# Patient Record
Sex: MALE | Race: WHITE | NOT HISPANIC OR LATINO | Employment: OTHER | URBAN - METROPOLITAN AREA
[De-identification: names, ages, dates, MRNs, and addresses within clinical notes are randomized per-mention and may not be internally consistent; named-entity substitution may affect disease eponyms.]

---

## 2017-01-04 ENCOUNTER — TELEPHONE (OUTPATIENT)
Dept: FAMILY MEDICINE CLINIC | Facility: CLINIC | Age: 72
End: 2017-01-04

## 2017-01-04 DIAGNOSIS — H54.7 VISION PROBLEMS: Primary | ICD-10-CM

## 2017-01-23 ENCOUNTER — CLINICAL SUPPORT (OUTPATIENT)
Dept: FAMILY MEDICINE CLINIC | Facility: CLINIC | Age: 72
End: 2017-01-23

## 2017-01-23 DIAGNOSIS — E53.8 VITAMIN B12 DEFICIENCY: Primary | ICD-10-CM

## 2017-01-23 PROCEDURE — 96372 THER/PROPH/DIAG INJ SC/IM: CPT | Performed by: NURSE PRACTITIONER

## 2017-01-23 RX ORDER — CYANOCOBALAMIN 1000 UG/ML
1000 INJECTION, SOLUTION INTRAMUSCULAR; SUBCUTANEOUS ONCE
Status: COMPLETED | OUTPATIENT
Start: 2017-01-23 | End: 2017-01-23

## 2017-01-23 RX ORDER — ATORVASTATIN CALCIUM 40 MG/1
TABLET, FILM COATED ORAL
Qty: 90 TABLET | Refills: 3 | Status: SHIPPED | OUTPATIENT
Start: 2017-01-23 | End: 2018-01-25 | Stop reason: SDUPTHER

## 2017-01-23 RX ADMIN — CYANOCOBALAMIN 1000 MCG: 1000 INJECTION, SOLUTION INTRAMUSCULAR; SUBCUTANEOUS at 16:00

## 2017-01-23 NOTE — MR AVS SNAPSHOT
Darshan Bhatt   1/23/2017 2:10 PM   Appointment    Dept Phone:  [phone]   Encounter #:  [id number]    Provider:  NURSE/MA [place]   Department:  [facility]                Your Full Care Plan              Where to Get Your Medications      These medications were sent to [facility] - [place], [place] - [address]  - [phone] [facility] [phone] FX  [address] [facility] [place] [address]     Phone:  [phone]     atorvastatin 40 MG tablet            Your Updated Medication List          This list is accurate as of: 1/23/17  2:58 PM.  Always use your most recent med list.                aspirin 81 MG tablet       atorvastatin 40 MG tablet   Commonly known as:  LIPITOR   TAKE 1 TABLET EVERY DAY       buPROPion  MG 24 hr tablet   Commonly known as:  WELLBUTRIN XL   Take 1 tablet by mouth Daily.       desvenlafaxine 50 MG 24 hr tablet   Commonly known as:  PRISTIQ   Take 1 tablet by mouth Daily.       levothyroxine 125 MCG tablet   Commonly known as:  SYNTHROID, LEVOTHROID   TAKE 1 TABLET DAILY AS DIRECTED       NITROSTAT 0.3 MG SL tablet   Generic drug:  nitroglycerin       pseudoephedrine 30 MG tablet   Commonly known as:  SUDAFED       sotalol 80 MG tablet   Commonly known as:  BETAPACE       terbinafine 250 MG tablet   Commonly known as:  lamiSIL   TAKE ONE TABLET BY MOUTH DAILY AS DIRECTED       XARELTO 20 MG tablet   Generic drug:  rivaroxaban       ZYRTEC ALLERGY 10 MG tablet   Generic drug:  cetirizine               Medications to be Given to You by a Medical Professional     Due       Frequency    1/3/2017 cyanocobalamin injection 1,000 mcg  Every 28 Days      Instructions     None    Patient Instructions History      Upcoming Appointments     Visit Type Date Time Department    NURSE/MA VISIT 1/23/2017  2:10 PM LANI Mcclendon Signup     [facility] allows you to send messages to your  doctor, view your test results, renew your prescriptions, schedule appointments, and more. To sign up, go to [email or web address] and click on the Sign Up Now link in the New User? box. Enter your [employer] Activation Code exactly as it appears below along with the last four digits of your Social Security Number and your Date of Birth () to complete the sign-up process. If you do not sign up before the expiration date, you must request a new code.    [employer] Activation Code: [email or web address]  Expires: 2017  2:58 PM    If you have questions, you can email [email or web address] or call [phone] to talk to our [employer] staff. Remember, [employer] is NOT to be used for urgent needs. For medical emergencies, dial 911.               Other Info from Your Visit           Allergies     Meloxicam        Vital Signs     Smoking Status                   Former Smoker

## 2017-01-26 ENCOUNTER — OFFICE VISIT (OUTPATIENT)
Dept: FAMILY MEDICINE CLINIC | Facility: CLINIC | Age: 72
End: 2017-01-26

## 2017-01-26 VITALS
HEART RATE: 50 BPM | SYSTOLIC BLOOD PRESSURE: 118 MMHG | HEIGHT: 72 IN | OXYGEN SATURATION: 99 % | TEMPERATURE: 98.3 F | DIASTOLIC BLOOD PRESSURE: 64 MMHG | WEIGHT: 205 LBS | BODY MASS INDEX: 27.77 KG/M2
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
## 2017-01-26 DIAGNOSIS — R07.81 RIB PAIN ON LEFT SIDE: Primary | ICD-10-CM

## 2017-01-26 PROCEDURE — 99214 OFFICE O/P EST MOD 30 MIN: CPT | Performed by: NURSE PRACTITIONER

## 2017-01-26 PROCEDURE — 71101 X-RAY EXAM UNILAT RIBS/CHEST: CPT | Performed by: NURSE PRACTITIONER

## 2017-01-26 RX ORDER — CYCLOBENZAPRINE HCL 10 MG
10 TABLET ORAL 3 TIMES DAILY PRN
Qty: 30 TABLET | Refills: 0 | Status: SHIPPED | OUTPATIENT
Start: 2017-01-26 | End: 2017-04-05

## 2017-01-26 NOTE — PROGRESS NOTES
"Subjective   Darshan Bhatt is a 71 y.o. male.     History of Present Illness   Patient presenting to the office today after falling out of his bed last night.  He fell on his left side and back and felt immediate sharp pain on his back.  He had back in the bed and try to go back to sleep.  He does feel sharp pain on the back left side no bruising no worsening with the pain when he takes a deep breath he is here to make sure he is not broken a rib.  He's walking okay he did not he has had no other problems or concerns except for the left mid back pain  The following portions of the patient's history were reviewed and updated as appropriate: allergies, current medications, past social history and problem list.    Review of Systems   Musculoskeletal: Positive for back pain.   All other systems reviewed and are negative.      Objective   Visit Vitals   • /64 (BP Location: Left arm, Patient Position: Sitting)   • Pulse 50   • Temp 98.3 °F (36.8 °C)   • Ht 72\" (182.9 cm)   • Wt 205 lb (93 kg)   • SpO2 99%   • BMI 27.8 kg/m2     Physical Exam   Constitutional: He is oriented to person, place, and time. Vital signs are normal. He appears well-developed and well-nourished. No distress.   HENT:   Head: Normocephalic.   Cardiovascular: Normal rate, regular rhythm and normal heart sounds.    Pulmonary/Chest: Effort normal and breath sounds normal. He exhibits bony tenderness.   Tenderness on back left side along 9-10th ribs  No bruising    Neurological: He is alert and oriented to person, place, and time. Gait normal.   Psychiatric: He has a normal mood and affect. His behavior is normal. Judgment and thought content normal.   Vitals reviewed.    Xray- ribs and PA chest due to fall and pain    Findings- normal  No comparison    Assessment/Plan   Problem List Items Addressed This Visit        Nervous and Auditory    Rib pain on left side - Primary    Relevant Medications    cyclobenzaprine (FLEXERIL) 10 MG tablet    Other " Relevant Orders    XR ribs left w pa chest       Other    Fall from bed    Relevant Orders    XR ribs left w pa chest        Tylenol, heat, rto if no improvement

## 2017-01-26 NOTE — MR AVS SNAPSHOT
Darshan Bhatt   1/26/2017 9:20 AM   Office Visit    Dept Phone:  [phone]   Encounter #:  [id number]    Provider:  ROSEMARIE Johnston   Department:  [facility]                Your Full Care Plan              Today's Medication Changes          These changes are accurate as of: 1/26/17 10:54 AM.  If you have any questions, ask your nurse or doctor.               New Medication(s)Ordered:     cyclobenzaprine 10 MG tablet   Commonly known as:  FLEXERIL   Take 1 tablet by mouth 3 (Three) Times a Day As Needed for muscle spasms.   Started by:  ROSEMARIE Johnston         Stop taking medication(s)listed here:     desvenlafaxine 50 MG 24 hr tablet   Commonly known as:  PRISTIQ   Stopped by:  ROSEMARIE Johnston                Where to Get Your Medications      These [facility] [address] [facility] [facility] [facility] [address] [facility] [phone] [facility] [phone] [address] , [facility] [address]     Phone:  [phone]     cyclobenzaprine 10 MG tablet                  Your Updated Medication List          This list is accurate as of: 1/26/17 10:54 AM.  Always use your most recent med list.                aspirin 81 MG tablet       atorvastatin 40 MG tablet   Commonly known as:  LIPITOR   TAKE 1 TABLET EVERY DAY       buPROPion  MG 24 hr tablet   Commonly known as:  WELLBUTRIN XL   Take 1 tablet by mouth Daily.       cyclobenzaprine 10 MG tablet   Commonly known as:  FLEXERIL   Take 1 tablet by mouth 3 (Three) Times a Day As Needed for muscle spasms.       levothyroxine 125 MCG tablet   Commonly known as:  SYNTHROID, LEVOTHROID   TAKE 1 TABLET DAILY AS DIRECTED       NITROSTAT 0.3 MG SL tablet   Generic drug:  nitroglycerin       pseudoephedrine 30 MG tablet   Commonly known as:  SUDAFED       sotalol 80 MG tablet   Commonly known as:  BETAPACE       terbinafine 250 MG tablet   Commonly known as:   "lamiSIL   TAKE ONE TABLET BY MOUTH DAILY AS DIRECTED       XARELTO 20 MG tablet   Generic drug:  rivaroxaban       ZYRTEC ALLERGY 10 MG tablet   Generic drug:  cetirizine               You Were Diagnosed With        Codes Comments    Rib pain on left side    -  Primary ICD-10-CM: R07.81  ICD-9-CM: 786.50     Fall from bed, initial encounter     ICD-10-CM: W06.XXXA  ICD-9-CM: E884.4       Medications to be Given to You by a Medical Professional     Due       Frequency    1/3/2017 cyanocobalamin injection 1,000 mcg  Every 28 Days      Instructions     None    Patient Instructions History      Upcoming Appointments     Visit Type Date Time Department    SAME DAY 2017  9:20 AM LANI BRITO [facility]      [employer] Signup     [facility] [employer] allows you to send messages to your doctor, view your test results, renew your prescriptions, schedule appointments, and more. To sign up, go to [employer] and click on the Sign Up Now link in the New User? box. Enter your [employer] Activation Code exactly as it appears below along with the last four digits of your Social Security Number and your Date of Birth () to complete the sign-up process. If you do not sign up before the expiration date, you must request a new code.    [employer] Activation Code: [email or web address]  Expires: 2017  2:58 PM    If you have questions, you can email [email or web address] or call [phone] to talk to our [employer] staff. Remember, [employer] is NOT to be used for urgent needs. For medical emergencies, dial 911.               Other Info from Your Visit           Allergies     Meloxicam        Reason for Visit     Back Pain Patient fell out of his bed last night and is having pain in the middle of his back       Vital Signs     Blood Pressure Pulse Temperature Height Weight Oxygen Saturation    118/64 (BP Location: Left arm, Patient Position: Sitting) 50 98.3 °F (36.8 °C) 72\" (182.9 cm) 205 lb (93 kg) 99%    Body Mass " Index Smoking Status                27.8 kg/m2 Former Smoker          Problems and Diagnoses Noted     Fall from bed    Rib pain on left side

## 2017-01-30 ENCOUNTER — TELEPHONE (OUTPATIENT)
Dept: FAMILY MEDICINE CLINIC | Facility: CLINIC | Age: 72
End: 2017-01-30

## 2017-01-30 DIAGNOSIS — R07.81 RIB PAIN: Primary | ICD-10-CM

## 2017-01-30 RX ORDER — HYDROCODONE BITARTRATE AND ACETAMINOPHEN 5; 325 MG/1; MG/1
1 TABLET ORAL EVERY 6 HOURS PRN
Qty: 60 TABLET | Refills: 0 | Status: SHIPPED | OUTPATIENT
Start: 2017-01-30 | End: 2017-08-24

## 2017-02-28 ENCOUNTER — TELEPHONE (OUTPATIENT)
Dept: FAMILY MEDICINE CLINIC | Facility: CLINIC | Age: 72
End: 2017-02-28

## 2017-02-28 RX ORDER — BUPROPION HYDROCHLORIDE 150 MG/1
150 TABLET ORAL DAILY
Qty: 90 TABLET | Refills: 2 | Status: SHIPPED | OUTPATIENT
Start: 2017-02-28 | End: 2017-12-13 | Stop reason: SDUPTHER

## 2017-03-14 ENCOUNTER — OFFICE VISIT (OUTPATIENT)
Dept: FAMILY MEDICINE CLINIC | Facility: CLINIC | Age: 72
End: 2017-03-14

## 2017-03-14 VITALS
WEIGHT: 205.6 LBS | BODY MASS INDEX: 27.85 KG/M2 | TEMPERATURE: 97.7 F | DIASTOLIC BLOOD PRESSURE: 66 MMHG | HEART RATE: 47 BPM | SYSTOLIC BLOOD PRESSURE: 104 MMHG | HEIGHT: 72 IN | OXYGEN SATURATION: 94 %

## 2017-03-14 DIAGNOSIS — J32.9 SINUSITIS, UNSPECIFIED CHRONICITY, UNSPECIFIED LOCATION: Primary | ICD-10-CM

## 2017-03-14 PROCEDURE — 99213 OFFICE O/P EST LOW 20 MIN: CPT | Performed by: NURSE PRACTITIONER

## 2017-03-14 RX ORDER — AMOXICILLIN 875 MG/1
875 TABLET, COATED ORAL 2 TIMES DAILY
Qty: 20 TABLET | Refills: 0 | Status: SHIPPED | OUTPATIENT
Start: 2017-03-14 | End: 2017-04-05

## 2017-03-14 RX ORDER — FLUTICASONE PROPIONATE 50 MCG
2 SPRAY, SUSPENSION (ML) NASAL DAILY
COMMUNITY
End: 2018-09-25

## 2017-03-14 RX ORDER — PREDNISOLONE ACETATE 10 MG/ML
SUSPENSION/ DROPS OPHTHALMIC
COMMUNITY
Start: 2017-02-21 | End: 2017-04-05

## 2017-03-14 NOTE — PROGRESS NOTES
"Krish Bhatt is a 71 y.o. male.     History of Present Illness   Upper Respiratory Infection: Patient complains of symptoms of a URI, possible sinusitis. Symptoms include congestion, irritability, plugged sensation in both ears, sore throat and swollen glands. Onset of symptoms was 5 days ago, unchanged since that time. He also c/o facial pain for the past 4 days .  He is drinking plenty of fluids. Evaluation to date: none. Treatment to date: none.        The following portions of the patient's history were reviewed and updated as appropriate: allergies, current medications, past social history and problem list.    Review of Systems   HENT: Positive for congestion and sinus pressure.    All other systems reviewed and are negative.      Objective   Visit Vitals   • /66 (BP Location: Right arm, Patient Position: Sitting)   • Pulse (!) 47   • Temp 97.7 °F (36.5 °C)   • Ht 72\" (182.9 cm)   • Wt 205 lb 9.6 oz (93.3 kg)   • SpO2 94%   • BMI 27.88 kg/m2     Physical Exam   Constitutional: He is oriented to person, place, and time. Vital signs are normal. He appears well-developed and well-nourished. No distress.   HENT:   Head: Normocephalic.   Right Ear: Tympanic membrane normal.   Left Ear: Tympanic membrane normal.   Nose: Rhinorrhea present.   Mouth/Throat: Posterior oropharyngeal erythema present.   Cardiovascular: Normal rate, regular rhythm and normal heart sounds.    Pulmonary/Chest: Effort normal and breath sounds normal.   Lymphadenopathy:     He has no cervical adenopathy.   Neurological: He is alert and oriented to person, place, and time. Gait normal.   Psychiatric: He has a normal mood and affect. His behavior is normal. Judgment and thought content normal.   Vitals reviewed.      Assessment/Plan   Problem List Items Addressed This Visit        Respiratory    Sinusitis - Primary    Relevant Medications    amoxicillin (AMOXIL) 875 MG tablet        rto prn     "

## 2017-03-21 ENCOUNTER — TRANSCRIBE ORDERS (OUTPATIENT)
Dept: ADMINISTRATIVE | Facility: HOSPITAL | Age: 72
End: 2017-03-21

## 2017-03-21 DIAGNOSIS — D03.0 MELANOMA IN SITU OF LIP (HCC): Primary | ICD-10-CM

## 2017-04-05 ENCOUNTER — HOSPITAL ENCOUNTER (OUTPATIENT)
Dept: INFUSION THERAPY | Facility: HOSPITAL | Age: 72
Discharge: HOME | End: 2017-04-05
Attending: SURGERY | Admitting: SURGERY

## 2017-04-05 VITALS
RESPIRATION RATE: 20 BRPM | TEMPERATURE: 98.5 F | OXYGEN SATURATION: 95 % | HEART RATE: 48 BPM | DIASTOLIC BLOOD PRESSURE: 64 MMHG | SYSTOLIC BLOOD PRESSURE: 94 MMHG

## 2017-04-05 DIAGNOSIS — C43.9 SKIN CANCER (MELANOMA) (HCC): Primary | ICD-10-CM

## 2017-04-05 PROCEDURE — 88305 TISSUE EXAM BY PATHOLOGIST: CPT | Performed by: SURGERY

## 2017-04-05 RX ORDER — LIDOCAINE HYDROCHLORIDE AND EPINEPHRINE 10; 10 MG/ML; UG/ML
20 INJECTION, SOLUTION INFILTRATION; PERINEURAL ONCE
Status: COMPLETED | OUTPATIENT
Start: 2017-04-05 | End: 2017-04-05

## 2017-04-05 RX ADMIN — SODIUM BICARBONATE 0.5 MG: 84 INJECTION, SOLUTION INTRAVENOUS at 15:50

## 2017-04-05 RX ADMIN — LIDOCAINE HYDROCHLORIDE,EPINEPHRINE BITARTRATE 20 ML: 10; .01 INJECTION, SOLUTION INFILTRATION; PERINEURAL at 15:50

## 2017-04-05 NOTE — OP NOTE
DATE OF PROCEDURE:  04/05/2017    SURGEON:  Oswald Fernandez MD     ASSISTANT: ST King     PREOPERATIVE DIAGNOSIS: Melanoma in situ, left side of upper lip.     POSTOPERATIVE DIAGNOSIS:  Melanoma in situ, left side of upper lip.     PROCEDURES PERFORMED:  Wide excision of melanoma in situ of left upper lip with local transposition flap closure.     ANESTHESIA: Lidocaine 1% with epinephrine.     FINDINGS: This 71-year-old male has a previously biopsied melanoma in situ involving the right side of the upper lip just below the left alar base. The excisional diameter is 2.1 cm.  The size of the flap used for reconstruction is 22.5 cm.     DESCRIPTION OF PROCEDURE: The patient was brought to the procedure room in the ambulatory care unit. He was marked out for the clinical margins of the previous biopsy. There was a small amount of very faint pigmentation in one area. We then marked out a 5 mm margin of uninvolved skin around this. Then, 1% lidocaine with epinephrine was used for a block of the infraorbital nerve and then for local infiltration of the upper lip as well as the superiorly based nasolabial flap that would be used for reconstruction. He was prepped with Betadine and draped into a sterile field. The flap had been marked out which was a superiorly based nasolabial flap. We measured the defect and had appropriate width so as to cause as little distortion of the lip as possible. I first excised the melanoma in situ and marked it with a short suture superiorly at the 12-o'clock position and a long suture on the left lateral side at the 3-o'clock position. Hemostasis was achieved with electrocautery. Then, the superiorly based nasolabial flap was incised. I then elevated, including adequate vascularity to give appropriate thickness and vascularity. It was then inset and trimmed as necessary. The flap was inset with 5-0 Vicryl subcuticular sutures and 6-0 nylon skin sutures, and the donor site closed in  the same fashion. The patient tolerated the procedure well. Written and verbal instructions in care were given.       HERMILO Corona:fran  D:   04/05/2017 16:56:11  T:   04/05/2017 18:29:17  Job ID:   [id number]  Document ID:   [id number]  cc:

## 2017-04-05 NOTE — PROGRESS NOTES
Tolerated procedure very well. Discussed discharge instructions using teach back method. Provided the patient a copy of the AVS and the prescription for pain medication from Dr. Fernandez. Discharged home at 1710. Security van called to take patient to his car due to weather.

## 2017-04-05 NOTE — PATIENT INSTRUCTIONS
MINOR SURGERY DISCHARGE INSTRUCTIONS    IMPORTANT:  The following information will help you return to your best level of health.  Wound Care:  ·  Your dressing may be removed:  ·   Tomorrow    ·  Clean suture line with peroxide 1-2 times a day.  ·  If incision is on head or neck, cover your pillow with a towel.  ·  Avoid stooping over or heavy lifting.  ·  If the incision is near your ear, clean your telephones.  ·  Avoid sun exposure to site.  You may shower:  ·  Tomorrow  ·  Apply ointment to incision 1-2 times a day.  Remove old ointment first.  ·  Apply ice to area for 24 hours - 15 minutes on & 15 minutes off.  30 minutes on  & 30 minutes off while awake.  Use an extra pillow when sleeping.  ·  Elevate area for 24-48 hours to reduce swelling.  Medications:   Following this procedure, you should continue to take all other medications as  directed by your primary physician unless otherwise instructed. There have been  no changes to the medications you provided us (with the following exceptions, if  applicable):   Resume taking your blood thinners or Aspirin on ___4/5/17________________   You may use Tylenol  for discomfort.   Other: _______________________________________________________  Activity:  ·    ·  You may resume normal activity:  ·    In __2____ days  ·  No strenuous activity, lifting or sports:  ·   Until seen by your MD    Call your doctor to make an appointment for:     On (date) ______Tuesday April 11th__________________________  Physician: Dr. Fernandez  Office # [phone]  Incision Care  An incision is when a surgeon cuts into your body. After surgery, the incision needs to be cared for properly to prevent infection.   HOW TO CARE FOR YOUR INCISION  · Take medicines only as directed by your health care provider.  · There are many different ways to close and cover an incision, including stitches, skin glue, and adhesive strips. Follow your health care provider's instructions on:    Incision care.     Bandage (dressing) changes and removal.    Incision closure removal.  · Do not take baths, swim, or use a hot tub until your health care provider approves. You may shower as directed by your health care provider.  · Resume your normal diet and activities as directed.  · Use anti-itch medicine (such as an antihistamine) as directed by your health care provider. The incision may itch while it is healing. Do not pick or scratch at the incision.  · Drink enough fluid to keep your urine clear or pale yellow.  SEEK MEDICAL CARE IF:   · You have drainage, redness, swelling, or pain at your incision site.  · You have muscle aches, chills, or a general ill feeling.  · You notice a bad smell coming from the incision or dressing.  · Your incision edges separate after the sutures, staples, or skin adhesive strips have been removed.  · You have persistent nausea or vomiting.  · You have a fever.  · You are dizzy.  SEEK IMMEDIATE MEDICAL CARE IF:   · You have a rash.  · You faint.  · You have difficulty breathing.  MAKE SURE YOU:   · Understand these instructions.  · Will watch your condition.  · Will get help right away if you are not doing well or get worse.     This information is not intended to replace advice given to you by your health care provider. Make sure you discuss any questions you have with your health care provider.     Document Released: 07/07/2006 Document Revised: 01/08/2016 Document Reviewed: 02/11/2015  [employer] Interactive Patient Education ©2016 [employer]

## 2017-04-07 LAB
CYTO UR: NORMAL
LAB AP CASE REPORT: NORMAL
LAB AP CLINICAL INFORMATION: NORMAL
Lab: NORMAL
PATH REPORT.FINAL DX SPEC: NORMAL
PATH REPORT.GROSS SPEC: NORMAL

## 2017-04-18 ENCOUNTER — CLINICAL SUPPORT (OUTPATIENT)
Dept: FAMILY MEDICINE CLINIC | Facility: CLINIC | Age: 72
End: 2017-04-18

## 2017-04-18 DIAGNOSIS — E53.8 VITAMIN B12 DEFICIENCY: Primary | ICD-10-CM

## 2017-04-18 PROCEDURE — 96372 THER/PROPH/DIAG INJ SC/IM: CPT | Performed by: NURSE PRACTITIONER

## 2017-04-18 RX ORDER — CYANOCOBALAMIN 1000 UG/ML
1000 INJECTION, SOLUTION INTRAMUSCULAR; SUBCUTANEOUS
Status: DISCONTINUED | OUTPATIENT
Start: 2017-04-18 | End: 2018-10-30

## 2017-04-18 RX ADMIN — CYANOCOBALAMIN 1000 MCG: 1000 INJECTION, SOLUTION INTRAMUSCULAR; SUBCUTANEOUS at 10:43

## 2017-06-02 ENCOUNTER — TELEPHONE (OUTPATIENT)
Dept: FAMILY MEDICINE CLINIC | Facility: CLINIC | Age: 72
End: 2017-06-02

## 2017-06-02 ENCOUNTER — CLINICAL SUPPORT (OUTPATIENT)
Dept: FAMILY MEDICINE CLINIC | Facility: CLINIC | Age: 72
End: 2017-06-02

## 2017-06-02 DIAGNOSIS — E53.8 VITAMIN B12 DEFICIENCY: Primary | ICD-10-CM

## 2017-06-02 PROCEDURE — 96372 THER/PROPH/DIAG INJ SC/IM: CPT | Performed by: NURSE PRACTITIONER

## 2017-06-02 RX ORDER — CYANOCOBALAMIN 1000 UG/ML
1000 INJECTION, SOLUTION INTRAMUSCULAR; SUBCUTANEOUS
Status: DISCONTINUED | OUTPATIENT
Start: 2017-06-02 | End: 2018-10-30

## 2017-06-02 RX ADMIN — CYANOCOBALAMIN 1000 MCG: 1000 INJECTION, SOLUTION INTRAMUSCULAR; SUBCUTANEOUS at 11:19

## 2017-06-02 NOTE — TELEPHONE ENCOUNTER
Patient is needing a referral [facility] Fitz Cox before he can make an appointment

## 2017-06-05 DIAGNOSIS — M54.50 LOW BACK PAIN WITHOUT SCIATICA, UNSPECIFIED BACK PAIN LATERALITY, UNSPECIFIED CHRONICITY: Primary | ICD-10-CM

## 2017-06-12 ENCOUNTER — OFFICE VISIT (OUTPATIENT)
Dept: FAMILY MEDICINE CLINIC | Facility: CLINIC | Age: 72
End: 2017-06-12

## 2017-06-12 VITALS
WEIGHT: 206.8 LBS | DIASTOLIC BLOOD PRESSURE: 60 MMHG | BODY MASS INDEX: 28.01 KG/M2 | SYSTOLIC BLOOD PRESSURE: 114 MMHG | HEART RATE: 58 BPM | HEIGHT: 72 IN | OXYGEN SATURATION: 98 % | TEMPERATURE: 97.9 F

## 2017-06-12 DIAGNOSIS — I48.91 ATRIAL FIBRILLATION, UNSPECIFIED TYPE (HCC): Primary | ICD-10-CM

## 2017-06-12 DIAGNOSIS — C43.9 MALIGNANT MELANOMA OF SKIN (HCC): ICD-10-CM

## 2017-06-12 DIAGNOSIS — E78.01 FAMILIAL HYPERCHOLESTEROLEMIA: ICD-10-CM

## 2017-06-12 DIAGNOSIS — N40.0 BENIGN PROSTATIC HYPERPLASIA, PRESENCE OF LOWER URINARY TRACT SYMPTOMS UNSPECIFIED, UNSPECIFIED MORPHOLOGY: ICD-10-CM

## 2017-06-12 DIAGNOSIS — I25.10 CORONARY ARTERIOSCLEROSIS IN NATIVE ARTERY: ICD-10-CM

## 2017-06-12 DIAGNOSIS — E03.9 HYPOTHYROIDISM, UNSPECIFIED TYPE: ICD-10-CM

## 2017-06-12 DIAGNOSIS — R42 DIZZINESS: ICD-10-CM
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
## 2017-06-12 PROBLEM — R07.81 RIB PAIN ON LEFT SIDE: Status: RESOLVED | Noted: 2017-01-26 | Resolved: 2017-06-12

## 2017-06-12 PROBLEM — J32.9 SINUSITIS: Status: RESOLVED | Noted: 2017-03-14 | Resolved: 2017-06-12

## 2017-06-12 PROCEDURE — 93000 ELECTROCARDIOGRAM COMPLETE: CPT | Performed by: NURSE PRACTITIONER

## 2017-06-12 PROCEDURE — 99214 OFFICE O/P EST MOD 30 MIN: CPT | Performed by: NURSE PRACTITIONER

## 2017-06-12 NOTE — PROGRESS NOTES
"Subjective   Darshan Bhatt is a 71 y.o. male.     History of Present Illness   Patient was helping his daughter and son-in-law move on Saturday and was walking up the basement stairs when he got to the top he stated that he felt very dizzy.  His son-in-law and his wife stated that with him between them and he passed out but he never went down to the floor because they held him up.  After he came to wishes a few seconds he drank about a gallon of water and started to feel better.  After the incident he was able to still help him move that he only broke down boxes he did not do any strenuous activity after that.  He does have a fibrillation and sees a cardiologist for this but hasn't seen him since probably March or April.  Patient states that he had eaten well that day he was feeling fine before hand but he was doing a lot of strenuous activity along with heavy lifting.  Patient states he did not experience any shortness of breath nausea or chest pain at the time of the incident.  Patient also has a history of stent placement and is taking his medications as directed.  Patient states since the incident he has felt fine he's not had any other dizzy spells or any other passing out spells.    The following portions of the patient's history were reviewed and updated as appropriate: allergies, current medications, past social history and problem list.    Review of Systems   Neurological: Positive for dizziness.   All other systems reviewed and are negative.      Objective   /60 (BP Location: Right arm, Patient Position: Sitting)  Pulse 58  Temp 97.9 °F (36.6 °C)  Ht 72\" (182.9 cm)  Wt 206 lb 12.8 oz (93.8 kg)  SpO2 98%  BMI 28.05 kg/m2  Physical Exam   Constitutional: He is oriented to person, place, and time. Vital signs are normal. He appears well-developed and well-nourished. No distress.   HENT:   Head: Normocephalic.   Cardiovascular: Normal rate, regular rhythm and normal heart sounds.    Pulmonary/Chest: " Effort normal and breath sounds normal.   Neurological: He is alert and oriented to person, place, and time. Gait normal.   Psychiatric: He has a normal mood and affect. His behavior is normal. Judgment and thought content normal.   Vitals reviewed.      ECG 12 Lead  Date/Time: 6/12/2017 1:43 PM  Performed by: STACY MORALES  Authorized by: STACY MORALES   Comparison: compared with previous ECG   Rhythm: atrial fibrillation  Rate: tachycardic  Conduction: conduction normal  ST Segments: ST segments normal  T Waves: T waves normal  QRS axis: normal  Clinical impression: abnormal ECG              Assessment/Plan   Problem List Items Addressed This Visit        Cardiovascular and Mediastinum    Atrial fibrillation - Primary    Relevant Orders    ECG 12 Lead    Coronary arteriosclerosis in native artery    Hyperlipidemia    Relevant Orders    Comprehensive Metabolic Panel    Lipid Panel With LDL / HDL Ratio       Endocrine    Hypothyroidism    Relevant Orders    TSH       Musculoskeletal and Integument    Malignant melanoma of skin    Relevant Orders    Ambulatory Referral to Dermatology       Genitourinary    Benign prostatic hyperplasia    Relevant Orders    PSA       Other    Dizziness    Relevant Orders    CBC & Differential           appointment with Cardio in 2 days.  I believe the Stranger was working on Saturday along with him probably at that time being in A. fib cause him to be dizzy and passed out.  rto prn  Follow up after labs

## 2017-06-13 LAB
ALBUMIN SERPL-MCNC: 4 G/DL (ref 3.5–4.8)
ALBUMIN/GLOB SERPL: 1.5 {RATIO} (ref 1.2–2.2)
ALP SERPL-CCNC: 65 IU/L (ref 39–117)
ALT SERPL-CCNC: 15 IU/L (ref 0–44)
AST SERPL-CCNC: 16 IU/L (ref 0–40)
BASOPHILS # BLD AUTO: 0 X10E3/UL (ref 0–0.2)
BASOPHILS NFR BLD AUTO: 0 %
BILIRUB SERPL-MCNC: 1 MG/DL (ref 0–1.2)
BUN SERPL-MCNC: 17 MG/DL (ref 8–27)
BUN/CREAT SERPL: 16 (ref 10–24)
CALCIUM SERPL-MCNC: 9 MG/DL (ref 8.6–10.2)
CHLORIDE SERPL-SCNC: 100 MMOL/L (ref 96–106)
CHOLEST SERPL-MCNC: 126 MG/DL (ref 100–199)
CO2 SERPL-SCNC: 24 MMOL/L (ref 18–29)
CREAT SERPL-MCNC: 1.04 MG/DL (ref 0.76–1.27)
EOSINOPHIL # BLD AUTO: 0.2 X10E3/UL (ref 0–0.4)
EOSINOPHIL NFR BLD AUTO: 3 %
ERYTHROCYTE [DISTWIDTH] IN BLOOD BY AUTOMATED COUNT: 14.1 % (ref 12.3–15.4)
GLOBULIN SER CALC-MCNC: 2.7 G/DL (ref 1.5–4.5)
GLUCOSE SERPL-MCNC: 96 MG/DL (ref 65–99)
HCT VFR BLD AUTO: 46.2 % (ref 37.5–51)
HDLC SERPL-MCNC: 53 MG/DL
HGB BLD-MCNC: 14.9 G/DL (ref 12.6–17.7)
IMM GRANULOCYTES # BLD: 0 X10E3/UL (ref 0–0.1)
IMM GRANULOCYTES NFR BLD: 0 %
LDLC SERPL CALC-MCNC: 49 MG/DL (ref 0–99)
LDLC/HDLC SERPL: 0.9 RATIO UNITS (ref 0–3.6)
LYMPHOCYTES # BLD AUTO: 1.9 X10E3/UL (ref 0.7–3.1)
LYMPHOCYTES NFR BLD AUTO: 29 %
MCH RBC QN AUTO: 30.1 PG (ref 26.6–33)
MCHC RBC AUTO-ENTMCNC: 32.3 G/DL (ref 31.5–35.7)
MCV RBC AUTO: 93 FL (ref 79–97)
MONOCYTES # BLD AUTO: 0.6 X10E3/UL (ref 0.1–0.9)
MONOCYTES NFR BLD AUTO: 10 %
NEUTROPHILS # BLD AUTO: 3.7 X10E3/UL (ref 1.4–7)
NEUTROPHILS NFR BLD AUTO: 58 %
PLATELET # BLD AUTO: 168 X10E3/UL (ref 150–379)
POTASSIUM SERPL-SCNC: 4.5 MMOL/L (ref 3.5–5.2)
PROT SERPL-MCNC: 6.7 G/DL (ref 6–8.5)
PSA SERPL-MCNC: 2.8 NG/ML (ref 0–4)
RBC # BLD AUTO: 4.95 X10E6/UL (ref 4.14–5.8)
SODIUM SERPL-SCNC: 143 MMOL/L (ref 134–144)
TRIGL SERPL-MCNC: 120 MG/DL (ref 0–149)
TSH SERPL DL<=0.005 MIU/L-ACNC: 3.33 UIU/ML (ref 0.45–4.5)
VLDLC SERPL CALC-MCNC: 24 MG/DL (ref 5–40)
WBC # BLD AUTO: 6.5 X10E3/UL (ref 3.4–10.8)

## 2017-06-30 ENCOUNTER — TELEPHONE (OUTPATIENT)
Dept: FAMILY MEDICINE CLINIC | Facility: CLINIC | Age: 72
End: 2017-06-30

## 2017-08-24 ENCOUNTER — OFFICE VISIT (OUTPATIENT)
Dept: FAMILY MEDICINE CLINIC | Facility: CLINIC | Age: 72
End: 2017-08-24

## 2017-08-24 VITALS
HEART RATE: 40 BPM | WEIGHT: 207.8 LBS | OXYGEN SATURATION: 98 % | BODY MASS INDEX: 28.15 KG/M2 | HEIGHT: 72 IN | RESPIRATION RATE: 16 BRPM | SYSTOLIC BLOOD PRESSURE: 116 MMHG | TEMPERATURE: 97.5 F | DIASTOLIC BLOOD PRESSURE: 74 MMHG

## 2017-08-24 DIAGNOSIS — E03.9 HYPOTHYROIDISM, UNSPECIFIED TYPE: ICD-10-CM

## 2017-08-24 DIAGNOSIS — E53.8 VITAMIN B12 DEFICIENCY: ICD-10-CM

## 2017-08-24 DIAGNOSIS — E78.01 FAMILIAL HYPERCHOLESTEROLEMIA: ICD-10-CM

## 2017-08-24 DIAGNOSIS — I25.10 CORONARY ARTERIOSCLEROSIS IN NATIVE ARTERY: ICD-10-CM

## 2017-08-24 DIAGNOSIS — I48.91 ATRIAL FIBRILLATION, UNSPECIFIED TYPE (HCC): Primary | ICD-10-CM

## 2017-08-24 PROCEDURE — 99214 OFFICE O/P EST MOD 30 MIN: CPT | Performed by: INTERNAL MEDICINE

## 2017-08-24 RX ORDER — SILODOSIN 8 MG/1
8 CAPSULE ORAL
COMMUNITY
End: 2018-09-25 | Stop reason: ALTCHOICE

## 2017-08-24 NOTE — PROGRESS NOTES
"Subjective   Patient ID: Darshan Bhatt is a 71 y.o. male presents with   Chief Complaint   Patient presents with   • Establish Care     New patient       HPI - This patient presents today to establish care.  He has a past history of coronary artery disease, atrial fibrillation, vitamin B12 deficiency, hypothyroidism, hyperlipidemia.  He sees cardiologist routinely.  He says at times he has poor exercise tolerance.  He gets weak if he walks up a hill.  But this is not new.    Assessment plan    Atrial fibrillation coronary artery disease-if the patient's having problems with exercise tolerance I would recommend the patient speak to his cardiologist as his pulse is very low on the [place].    Atrial fibrillation anticoagulated    Hypothyroidism levothyroxin 125    Vitamin B12 deficiency takes oral vitamin B12    Hyperlipidemia Lipitor 40    Allergies   Allergen Reactions   • Meloxicam        The following portions of the patient's history were reviewed and updated as appropriate: allergies, current medications, past family history, past medical history, past social history, past surgical history and problem list.      Review of Systems   Constitutional: Negative.    HENT: Negative.    Eyes: Negative.    Respiratory: Negative.    Cardiovascular: Negative.    Gastrointestinal: Negative.    Endocrine: Negative.    Genitourinary: Negative.    Musculoskeletal: Negative.    Skin: Negative.    Allergic/Immunologic: Negative.    Neurological: Negative.    Hematological: Negative.    Psychiatric/Behavioral: Negative.        Objective     Vitals:    08/24/17 1056   BP: 116/74   Pulse: (!) 40   Resp: 16   Temp: 97.5 °F (36.4 °C)   TempSrc: Oral   SpO2: 98%   Weight: 207 lb 12.8 oz (94.3 kg)   Height: 72\" (182.9 cm)         Physical Exam   Constitutional: He is oriented to person, place, and time. He appears well-developed and well-nourished. No distress.   HENT:   Head: Normocephalic and atraumatic.   Eyes: Conjunctivae and EOM " are normal. Pupils are equal, round, and reactive to light. Right eye exhibits no discharge. Left eye exhibits no discharge. No scleral icterus.   Neck: Normal range of motion. Neck supple. No tracheal deviation present. No thyromegaly present.   Cardiovascular: Normal rate, regular rhythm, normal heart sounds, intact distal pulses and normal pulses.  Exam reveals no gallop.    No murmur heard.  Pulmonary/Chest: Effort normal and breath sounds normal. No respiratory distress. He has no wheezes. He has no rales.   Musculoskeletal: Normal range of motion.   Neurological: He is alert and oriented to person, place, and time. He exhibits normal muscle tone. Coordination normal.   Skin: Skin is warm. No rash noted. No erythema. No pallor.   Psychiatric: He has a normal mood and affect. His behavior is normal. Judgment and thought content normal.   Nursing note and vitals reviewed.        Darshan was seen today for establish care.    Diagnoses and all orders for this visit:    Atrial fibrillation, unspecified type    Coronary arteriosclerosis in native artery    Hypothyroidism, unspecified type    Vitamin B12 deficiency    Familial hypercholesterolemia        Call or return to clinic prn if these symptoms worsen or fail to improve as anticipated.

## 2017-09-18 ENCOUNTER — DOCUMENTATION (OUTPATIENT)
Dept: ONCOLOGY | Facility: HOSPITAL | Age: 72
End: 2017-09-18

## 2017-09-18 NOTE — PROGRESS NOTES
Referral rec'd from [facility] [place].  Patient in need of therapy for depression.  Called and spoke to patient via phone.  Patient states that he has suffered with depression for years and is on medication ( wellbutrin XL 15o mg) that is being managed by his primary care office.  Patient states that his depression does not stem from his recent melanoma dx but something that he has battled for a while.  Patient would like the names of therapists in the community that could help him.  [facility] provided a list via mail.    Encouraged patient to call [facility] if none of these providers work out for him.  Thank you,  Shaina Chavez, MSSW, CSW, OSW-C

## 2017-11-21 RX ORDER — RIVAROXABAN 20 MG/1
20 TABLET, FILM COATED ORAL
Qty: 90 TABLET | Refills: 3 | Status: SHIPPED | OUTPATIENT
Start: 2017-11-21 | End: 2018-10-02 | Stop reason: SDUPTHER

## 2017-12-13 ENCOUNTER — TELEPHONE (OUTPATIENT)
Dept: FAMILY MEDICINE CLINIC | Facility: CLINIC | Age: 72
End: 2017-12-13

## 2017-12-13 RX ORDER — BUPROPION HYDROCHLORIDE 150 MG/1
TABLET ORAL
Qty: 90 TABLET | Refills: 2 | Status: CANCELLED | OUTPATIENT
Start: 2017-12-13

## 2017-12-13 RX ORDER — BUPROPION HYDROCHLORIDE 150 MG/1
150 TABLET ORAL DAILY
Qty: 90 TABLET | Refills: 2 | Status: SHIPPED | OUTPATIENT
Start: 2017-12-13 | End: 2018-09-10 | Stop reason: SDUPTHER

## 2017-12-13 NOTE — TELEPHONE ENCOUNTER
Patient got a refill for Bupropion but it came from mail order and he has to wait to get it in the mail so he is going to go to the pharmacy and try to get another couple pills but can we call the rep and see if we can get him some samples. Please call him on [phone]

## 2018-01-18 RX ORDER — ATORVASTATIN CALCIUM 40 MG/1
TABLET, FILM COATED ORAL
Qty: 90 TABLET | Refills: 3 | OUTPATIENT
Start: 2018-01-18

## 2018-01-23 RX ORDER — ATORVASTATIN CALCIUM 40 MG/1
TABLET, FILM COATED ORAL
Qty: 90 TABLET | Refills: 3 | OUTPATIENT
Start: 2018-01-23

## 2018-01-25 RX ORDER — ATORVASTATIN CALCIUM 40 MG/1
40 TABLET, FILM COATED ORAL DAILY
Qty: 90 TABLET | Refills: 1 | Status: SHIPPED | OUTPATIENT
Start: 2018-01-25 | End: 2018-07-03 | Stop reason: SDUPTHER

## 2018-02-22 ENCOUNTER — TELEPHONE (OUTPATIENT)
Dept: FAMILY MEDICINE CLINIC | Facility: CLINIC | Age: 73
End: 2018-02-22

## 2018-02-22 DIAGNOSIS — M25.512 CHRONIC PAIN OF BOTH SHOULDERS: Primary | ICD-10-CM

## 2018-02-22 DIAGNOSIS — G89.29 CHRONIC PAIN OF BOTH SHOULDERS: Primary | ICD-10-CM

## 2018-02-22 DIAGNOSIS — M25.511 CHRONIC PAIN OF BOTH SHOULDERS: Primary | ICD-10-CM

## 2018-03-07 ENCOUNTER — TREATMENT (OUTPATIENT)
Dept: PHYSICAL THERAPY | Facility: CLINIC | Age: 73
End: 2018-03-07

## 2018-03-07 DIAGNOSIS — M75.01 SECONDARY ADHESIVE CAPSULITIS OF SHOULDER, RIGHT: Primary | ICD-10-CM

## 2018-03-07 PROCEDURE — 97140 MANUAL THERAPY 1/> REGIONS: CPT | Performed by: PHYSICAL THERAPIST

## 2018-03-07 PROCEDURE — G8985 CARRY GOAL STATUS: HCPCS | Performed by: PHYSICAL THERAPIST

## 2018-03-07 PROCEDURE — G8984 CARRY CURRENT STATUS: HCPCS | Performed by: PHYSICAL THERAPIST

## 2018-03-07 PROCEDURE — 97110 THERAPEUTIC EXERCISES: CPT | Performed by: PHYSICAL THERAPIST

## 2018-03-07 PROCEDURE — 97161 PT EVAL LOW COMPLEX 20 MIN: CPT | Performed by: PHYSICAL THERAPIST

## 2018-03-12 ENCOUNTER — TREATMENT (OUTPATIENT)
Dept: PHYSICAL THERAPY | Facility: CLINIC | Age: 73
End: 2018-03-12

## 2018-03-12 DIAGNOSIS — M75.01 SECONDARY ADHESIVE CAPSULITIS OF SHOULDER, RIGHT: Primary | ICD-10-CM

## 2018-03-12 PROCEDURE — 97110 THERAPEUTIC EXERCISES: CPT | Performed by: PHYSICAL THERAPIST

## 2018-03-12 PROCEDURE — 97140 MANUAL THERAPY 1/> REGIONS: CPT | Performed by: PHYSICAL THERAPIST

## 2018-03-12 PROCEDURE — 97035 APP MDLTY 1+ULTRASOUND EA 15: CPT | Performed by: PHYSICAL THERAPIST

## 2018-03-12 NOTE — PROGRESS NOTES
Physical Therapy Daily Progress Note        Subjective     Darshan Bhatt reports: No significant change yet. I did HEP twice.    Objective   See Exercise, Manual, and Modality Logs for complete treatment.       Assessment/Plan  Some soreness with therex. Difficulty with ball pressdowns. Limited scap retraction. Needed cueing for L scapula to retract and elevate    Progress per Plan of Care           Manual Therapy:  15       mins  61500;  Therapeutic Exercise: 20      mins  51907;     Neuromuscular Staci:       mins  55685;    Therapeutic Activity:         mins  19997;     Gait Training:         mins  06074;     Ultrasound:   8      mins  83602;    Electrical Stimulation:        mins  18985 ( );  Dry Needling         mins self-pay    Timed Treatment:   43   mins   Total Treatment:     45   mins    Dee Clark PT  Physical Therapist  [place] License # [id number]

## 2018-03-14 ENCOUNTER — TREATMENT (OUTPATIENT)
Dept: PHYSICAL THERAPY | Facility: CLINIC | Age: 73
End: 2018-03-14

## 2018-03-14 DIAGNOSIS — M75.01 SECONDARY ADHESIVE CAPSULITIS OF SHOULDER, RIGHT: Primary | ICD-10-CM

## 2018-03-14 PROCEDURE — 97140 MANUAL THERAPY 1/> REGIONS: CPT | Performed by: PHYSICAL THERAPIST

## 2018-03-14 PROCEDURE — 97110 THERAPEUTIC EXERCISES: CPT | Performed by: PHYSICAL THERAPIST

## 2018-03-14 PROCEDURE — 97112 NEUROMUSCULAR REEDUCATION: CPT | Performed by: PHYSICAL THERAPIST

## 2018-03-14 NOTE — PROGRESS NOTES
Physical Therapy Daily Progress Note         Subjective     Darshan Bhatt reports: Getting a little better    Objective   See Exercise, Manual, and Modality Logs for complete treatment.       Assessment/Plan  Much improved active flexion and abduction to nearly 150 degrees with less pain.    Progress per Plan of Care           Manual Therapy:  15       mins  32528;  Therapeutic Exercise: 20      mins  67790;     Neuromuscular Staci:6       mins  77692;    Therapeutic Activity:         mins  65793;     Gait Training:         mins  28022;     Ultrasound:    8     mins  81733;    Electrical Stimulation:        mins  77652 ( );  Dry Needling         mins self-pay    Timed Treatment:   49   mins   Total Treatment:     52   mins    Dee Clark, PT  Physical Therapist  [place] License # [id number]

## 2018-03-19 ENCOUNTER — OFFICE VISIT (OUTPATIENT)
Dept: FAMILY MEDICINE CLINIC | Facility: CLINIC | Age: 73
End: 2018-03-19

## 2018-03-19 VITALS
RESPIRATION RATE: 18 BRPM | SYSTOLIC BLOOD PRESSURE: 132 MMHG | TEMPERATURE: 98.6 F | WEIGHT: 205.1 LBS | OXYGEN SATURATION: 97 % | BODY MASS INDEX: 27.78 KG/M2 | HEART RATE: 72 BPM | HEIGHT: 72 IN | DIASTOLIC BLOOD PRESSURE: 84 MMHG

## 2018-03-19 DIAGNOSIS — E78.01 FAMILIAL HYPERCHOLESTEROLEMIA: ICD-10-CM

## 2018-03-19 DIAGNOSIS — E53.8 VITAMIN B12 DEFICIENCY: ICD-10-CM

## 2018-03-19 DIAGNOSIS — E03.9 HYPOTHYROIDISM, UNSPECIFIED TYPE: ICD-10-CM

## 2018-03-19 DIAGNOSIS — I48.91 ATRIAL FIBRILLATION, UNSPECIFIED TYPE (HCC): Primary | ICD-10-CM

## 2018-03-19 PROCEDURE — 99214 OFFICE O/P EST MOD 30 MIN: CPT | Performed by: INTERNAL MEDICINE

## 2018-03-19 NOTE — PROGRESS NOTES
"Subjective   Patient ID: Darshan Bhatt is a 72 y.o. male presents with   Chief Complaint   Patient presents with   • Hypothyroidism       HPI - This patient presents today for follow-up for hypothyroidism, hypercholesterolemia, vitamin B12 deficiency and treatment of atrial fibrillation.  Overall he's feeling pretty good.    Assessment plan    Vitamin B12 deficiency gets vitamin B12 shots we'll check a B12 level    Hyperlipidemia-fasting lipid profile and Lipitor 40    Hypothyroidism levothyroxin 125 we'll check a thyroid panel    A. fib he's anticoagulated and on sotalol he sees cardiology.    Allergies   Allergen Reactions   • Meloxicam        The following portions of the patient's history were reviewed and updated as appropriate: allergies, current medications, past family history, past medical history, past social history, past surgical history and problem list.      Review of Systems   Constitutional: Negative.    HENT: Negative.    Eyes: Negative.    Respiratory: Negative.    Cardiovascular: Negative.    Gastrointestinal: Negative.    Endocrine: Negative.    Genitourinary: Negative.    Musculoskeletal: Negative.    Skin: Negative.    Allergic/Immunologic: Negative.    Neurological: Positive for tremors.   Hematological: Negative.    Psychiatric/Behavioral: Negative.        Objective     Vitals:    03/19/18 1432   BP: 132/84   Pulse: 72   Resp: 18   Temp: 98.6 °F (37 °C)   TempSrc: Oral   SpO2: 97%   Weight: 93 kg (205 lb 1.6 oz)   Height: 182.9 cm (72\")         Physical Exam   Constitutional: He is oriented to person, place, and time. He appears well-developed and well-nourished.   HENT:   Head: Normocephalic and atraumatic.   Mouth/Throat: Oropharynx is clear and moist.   Eyes: Conjunctivae and EOM are normal. Pupils are equal, round, and reactive to light.   Neck: Neck supple. No thyromegaly present.   Cardiovascular: Normal rate, regular rhythm and normal heart sounds.    Pulmonary/Chest: Effort normal and " breath sounds normal.   Abdominal: He exhibits no distension.   Musculoskeletal: Normal range of motion. He exhibits no edema.      During the foot exam he had a monofilament test not performed.  Neurological: He is alert and oriented to person, place, and time.   Skin: Skin is warm, dry and intact.   Psychiatric: He has a normal mood and affect. Judgment normal.   Nursing note and vitals reviewed.        Darshan was seen today for hypothyroidism.    Diagnoses and all orders for this visit:    Atrial fibrillation, unspecified type  -     Comprehensive Metabolic Panel  -     TSH+Free T4  -     Lipid Panel  -     Vitamin B12    Vitamin B12 deficiency  -     Comprehensive Metabolic Panel  -     TSH+Free T4  -     Lipid Panel  -     Vitamin B12    Hypothyroidism, unspecified type  -     Comprehensive Metabolic Panel  -     TSH+Free T4  -     Lipid Panel  -     Vitamin B12    Familial hypercholesterolemia  -     Comprehensive Metabolic Panel  -     TSH+Free T4  -     Lipid Panel  -     Vitamin B12        Call or return to clinic prn if these symptoms worsen or fail to improve as anticipated.

## 2018-03-20 ENCOUNTER — TREATMENT (OUTPATIENT)
Dept: PHYSICAL THERAPY | Facility: CLINIC | Age: 73
End: 2018-03-20

## 2018-03-20 ENCOUNTER — CLINICAL SUPPORT (OUTPATIENT)
Dept: FAMILY MEDICINE CLINIC | Facility: CLINIC | Age: 73
End: 2018-03-20

## 2018-03-20 DIAGNOSIS — E53.8 VITAMIN B 12 DEFICIENCY: Primary | ICD-10-CM

## 2018-03-20 DIAGNOSIS — M75.01 SECONDARY ADHESIVE CAPSULITIS OF SHOULDER, RIGHT: Primary | ICD-10-CM

## 2018-03-20 LAB
ALBUMIN SERPL-MCNC: 4.5 G/DL (ref 3.5–5.2)
ALBUMIN/GLOB SERPL: 1.9 G/DL
ALP SERPL-CCNC: 65 U/L (ref 39–117)
ALT SERPL-CCNC: 15 U/L (ref 1–41)
AST SERPL-CCNC: 11 U/L (ref 1–40)
BILIRUB SERPL-MCNC: 1.1 MG/DL (ref 0.1–1.2)
BUN SERPL-MCNC: 16 MG/DL (ref 8–23)
BUN/CREAT SERPL: 14 (ref 7–25)
CALCIUM SERPL-MCNC: 9.8 MG/DL (ref 8.6–10.5)
CHLORIDE SERPL-SCNC: 100 MMOL/L (ref 98–107)
CHOLEST SERPL-MCNC: 131 MG/DL (ref 0–200)
CO2 SERPL-SCNC: 30.5 MMOL/L (ref 22–29)
CREAT SERPL-MCNC: 1.14 MG/DL (ref 0.76–1.27)
GFR SERPLBLD CREATININE-BSD FMLA CKD-EPI: 63 ML/MIN/1.73
GFR SERPLBLD CREATININE-BSD FMLA CKD-EPI: 76 ML/MIN/1.73
GLOBULIN SER CALC-MCNC: 2.4 GM/DL
GLUCOSE SERPL-MCNC: 88 MG/DL (ref 65–99)
HDLC SERPL-MCNC: 62 MG/DL (ref 40–60)
LDLC SERPL CALC-MCNC: 52 MG/DL (ref 0–100)
POTASSIUM SERPL-SCNC: 4.7 MMOL/L (ref 3.5–5.2)
PROT SERPL-MCNC: 6.9 G/DL (ref 6–8.5)
SODIUM SERPL-SCNC: 139 MMOL/L (ref 136–145)
T4 FREE SERPL-MCNC: 1.8 NG/DL (ref 0.93–1.7)
TRIGL SERPL-MCNC: 87 MG/DL (ref 0–150)
TSH SERPL DL<=0.005 MIU/L-ACNC: 2.1 MIU/ML (ref 0.27–4.2)
VIT B12 SERPL-MCNC: 284 PG/ML (ref 211–946)
VLDLC SERPL CALC-MCNC: 17.4 MG/DL (ref 5–40)

## 2018-03-20 PROCEDURE — 96372 THER/PROPH/DIAG INJ SC/IM: CPT | Performed by: INTERNAL MEDICINE

## 2018-03-20 PROCEDURE — 97112 NEUROMUSCULAR REEDUCATION: CPT | Performed by: PHYSICAL THERAPIST

## 2018-03-20 PROCEDURE — 97140 MANUAL THERAPY 1/> REGIONS: CPT | Performed by: PHYSICAL THERAPIST

## 2018-03-20 PROCEDURE — 97110 THERAPEUTIC EXERCISES: CPT | Performed by: PHYSICAL THERAPIST

## 2018-03-20 RX ORDER — CYANOCOBALAMIN 1000 UG/ML
1000 INJECTION, SOLUTION INTRAMUSCULAR; SUBCUTANEOUS
Status: DISCONTINUED | OUTPATIENT
Start: 2018-03-20 | End: 2018-10-30

## 2018-03-20 RX ADMIN — CYANOCOBALAMIN 1000 MCG: 1000 INJECTION, SOLUTION INTRAMUSCULAR; SUBCUTANEOUS at 10:43

## 2018-03-20 NOTE — PROGRESS NOTES
Physical Therapy Daily Progress Note        Subjective     Darshan Bhatt reports: Had trouble with sleeping last night. L scapula feeling better. Movement in shoulder is better    Objective   See Exercise, Manual, and Modality Logs for complete treatment.       Assessment/Plan  Improving motion. Persistent lateral shoulder pain worse with abduction. Dod well with new therex today.    Progress per Plan of Care           Manual Therapy:  14       mins  16007;  Therapeutic Exercise: 35      mins  59010;     Neuromuscular Staci:5       mins  67532;    Therapeutic Activity:         mins  41281;     Gait Training:         mins  40898;     Ultrasound:         mins  37226;    Electrical Stimulation:        mins  94225 ( );  Dry Needling         mins self-pay    Timed Treatment:   54   mins   Total Treatment:     55   mins    Dee Clark, PT  Physical Therapist  [place] License # [id number]

## 2018-03-23 ENCOUNTER — TREATMENT (OUTPATIENT)
Dept: PHYSICAL THERAPY | Facility: CLINIC | Age: 73
End: 2018-03-23

## 2018-03-23 DIAGNOSIS — M75.01 SECONDARY ADHESIVE CAPSULITIS OF SHOULDER, RIGHT: Primary | ICD-10-CM

## 2018-03-23 PROCEDURE — 97110 THERAPEUTIC EXERCISES: CPT | Performed by: PHYSICAL THERAPIST

## 2018-03-23 PROCEDURE — 97140 MANUAL THERAPY 1/> REGIONS: CPT | Performed by: PHYSICAL THERAPIST

## 2018-03-23 NOTE — PROGRESS NOTES
Physical Therapy Daily Progress Note        Subjective     Darshan Bhatt reports: I am encouraged with mu shoulder. R one stil a little slow to get better but  Can tell it is some better. L scapular area better as well    Objective   See Exercise, Manual, and Modality Logs for complete treatment.       Assessment/Plan  Needs cueing for proper form with exercises but complaints of pain. Active abduction still limited and somewhat painful    Progress per Plan of Care           Manual Therapy:  15       mins  82092;  Therapeutic Exercise: 35      mins  76473;     Neuromuscular Staci:       mins  20296;    Therapeutic Activity:         mins  64450;     Gait Training:         mins  59535;     Ultrasound:         mins  22425;    Electrical Stimulation:        mins  87548 ( );  Dry Needling         mins self-pay    Timed Treatment:   50   mins   Total Treatment:     52   mins    Dee Clark PT  Physical Therapist  [place] License # [id number]

## 2018-03-27 ENCOUNTER — TREATMENT (OUTPATIENT)
Dept: PHYSICAL THERAPY | Facility: CLINIC | Age: 73
End: 2018-03-27

## 2018-03-27 DIAGNOSIS — M75.01 SECONDARY ADHESIVE CAPSULITIS OF SHOULDER, RIGHT: Primary | ICD-10-CM

## 2018-03-27 PROCEDURE — 97110 THERAPEUTIC EXERCISES: CPT | Performed by: PHYSICAL THERAPIST

## 2018-03-27 PROCEDURE — 97140 MANUAL THERAPY 1/> REGIONS: CPT | Performed by: PHYSICAL THERAPIST

## 2018-03-27 NOTE — PROGRESS NOTES
Physical Therapy Daily Progress Note        Subjective     Darshan Bhatt reports: Not as good as I should be with my HEP    Objective   See Exercise, Manual, and Modality Logs for complete treatment.       Assessment/Plan  Improved PROM with less pain. Pt needs cueing for proper GH alignment with ER.     Progress per Plan of Care           Manual Therapy:  10       mins  91814;  Therapeutic Exercise: 30      mins  34506;   (10 unattended)  Neuromuscular Staci:       mins  83663;    Therapeutic Activity:         mins  08109;     Gait Training:         mins  82424;     Ultrasound:         mins  86729;    Electrical Stimulation:        mins  05670 ( );  Dry Needling         mins self-pay    Timed Treatment:   30   mins   Total Treatment:     45   mins    Dee Clark, PT  Physical Therapist  [place] License # [id number]

## 2018-04-02 ENCOUNTER — TREATMENT (OUTPATIENT)
Dept: PHYSICAL THERAPY | Facility: CLINIC | Age: 73
End: 2018-04-02

## 2018-04-02 DIAGNOSIS — M75.01 SECONDARY ADHESIVE CAPSULITIS OF SHOULDER, RIGHT: Primary | ICD-10-CM

## 2018-04-02 PROCEDURE — 97110 THERAPEUTIC EXERCISES: CPT | Performed by: PHYSICAL THERAPIST

## 2018-04-02 PROCEDURE — 97140 MANUAL THERAPY 1/> REGIONS: CPT | Performed by: PHYSICAL THERAPIST

## 2018-04-02 NOTE — PROGRESS NOTES
Physical Therapy Daily Progress Note    Visit #: 6  Darshan Bhatt reports: I am getting more mobility in the shoulder. R shoulder pain is in the front. L shoulder pain is in the back.   Pain Scale (0-10):     Subjective       Objective   See Exercise, Manual, and Modality Logs for complete treatment.     PROCEDURES AND MODALITIES:  Paraffin:   pre-rx  Moist Heat:    Ice:   post-rx  E-Stim:    Ultrasound: Rx Minutes: 6/8  Ionto:   Traction:    Dry Needling:         Manual PT 85482 12 minutes and Therapy Exercise 25490 30 minutes     Timed Code Treatment: 50 Minutes  Total Treatment Time: 60 Minutes    Assessment/Plan  AROM of R shoulder is improving. Needs VCs for posture. Not sure of compliance with HEP due to confusion with exercises.     Progress per Plan of Care      Margarita Keller PT, DPT  Physical Therapist  [place] License # [id number]

## 2018-04-04 ENCOUNTER — TREATMENT (OUTPATIENT)
Dept: PHYSICAL THERAPY | Facility: CLINIC | Age: 73
End: 2018-04-04

## 2018-04-04 DIAGNOSIS — M75.01 SECONDARY ADHESIVE CAPSULITIS OF SHOULDER, RIGHT: Primary | ICD-10-CM

## 2018-04-04 PROCEDURE — 97110 THERAPEUTIC EXERCISES: CPT | Performed by: PHYSICAL THERAPIST

## 2018-04-04 PROCEDURE — 97140 MANUAL THERAPY 1/> REGIONS: CPT | Performed by: PHYSICAL THERAPIST

## 2018-04-04 NOTE — PROGRESS NOTES
Physical Therapy Daily Progress Note    Visit #: 7  Darshan Bhatt reports: Shoulder is sore the day of PT appts but feels better the next day. Can tell I am making progress with each PT visit.   Pain Scale (0-10):     Subjective       Objective   See Exercise, Manual, and Modality Logs for complete treatment.     PROCEDURES AND MODALITIES:  Paraffin:   pre-rx  Moist Heat:    Ice:   post-rx  E-Stim:    Ultrasound: Rx Minutes: 8/10  Ionto:   Traction:    Dry Needling:         Manual PT 76582 13 minutes and Therapy Exercise 28279 30 minutes     Timed Code Treatment: 43 Minutes (US was performed by tech, non billable)  Total Treatment Time: 59 Minutes    Assessment/Plan  Mobility of L and R GH joints is becoming more symmetrical. ANT capusle remains restricted affecting ER.     Progress strengthening /stabilization /functional activity      Margarita Keller, PT, DPT  Physical Therapist  [place] License # [id number]

## 2018-04-09 ENCOUNTER — CLINICAL SUPPORT (OUTPATIENT)
Dept: FAMILY MEDICINE CLINIC | Facility: CLINIC | Age: 73
End: 2018-04-09

## 2018-04-09 ENCOUNTER — TREATMENT (OUTPATIENT)
Dept: PHYSICAL THERAPY | Facility: CLINIC | Age: 73
End: 2018-04-09

## 2018-04-09 DIAGNOSIS — M75.01 SECONDARY ADHESIVE CAPSULITIS OF SHOULDER, RIGHT: Primary | ICD-10-CM

## 2018-04-09 DIAGNOSIS — E53.8 VITAMIN B12 DEFICIENCY: Primary | ICD-10-CM

## 2018-04-09 PROCEDURE — 96372 THER/PROPH/DIAG INJ SC/IM: CPT | Performed by: INTERNAL MEDICINE

## 2018-04-09 PROCEDURE — 97110 THERAPEUTIC EXERCISES: CPT | Performed by: PHYSICAL THERAPIST

## 2018-04-09 PROCEDURE — 97140 MANUAL THERAPY 1/> REGIONS: CPT | Performed by: PHYSICAL THERAPIST

## 2018-04-09 RX ORDER — CYANOCOBALAMIN 1000 UG/ML
1000 INJECTION, SOLUTION INTRAMUSCULAR; SUBCUTANEOUS
Status: DISCONTINUED | OUTPATIENT
Start: 2018-04-09 | End: 2018-10-30

## 2018-04-09 RX ADMIN — CYANOCOBALAMIN 1000 MCG: 1000 INJECTION, SOLUTION INTRAMUSCULAR; SUBCUTANEOUS at 11:04

## 2018-04-09 NOTE — PROGRESS NOTES
Physical Therapy Daily Progress Note        Subjective     Darshan Bhatt reports: I am getting better. L side feels 100%; R shoulder about 75%    Objective   See Exercise, Manual, and Modality Logs for complete treatment.       Assessment/Plan  Nearly full PROM. SOme pain with abduction but improved after inferior GH mob. Needs reminders of HEP . Reports he is not doing them at home.    Progress per Plan of Care           Manual Therapy:  12       mins  96750;  Therapeutic Exercise: 30      mins  05357;     Neuromuscular Staci:       mins  53302;    Therapeutic Activity:         mins  51174;     Gait Training:         mins  34098;     Ultrasound:         mins  02665;    Electrical Stimulation:        mins  29661 ( );  Dry Needling         mins self-pay    Timed Treatment:   42   mins   Total Treatment:     45   mins    Dee Clark, PT  Physical Therapist  [place] License # [id number]

## 2018-04-11 ENCOUNTER — TREATMENT (OUTPATIENT)
Dept: PHYSICAL THERAPY | Facility: CLINIC | Age: 73
End: 2018-04-11

## 2018-04-11 DIAGNOSIS — M75.01 SECONDARY ADHESIVE CAPSULITIS OF SHOULDER, RIGHT: Primary | ICD-10-CM

## 2018-04-11 PROCEDURE — 97140 MANUAL THERAPY 1/> REGIONS: CPT | Performed by: PHYSICAL THERAPIST

## 2018-04-11 PROCEDURE — 97110 THERAPEUTIC EXERCISES: CPT | Performed by: PHYSICAL THERAPIST

## 2018-04-12 NOTE — PROGRESS NOTES
Physical Therapy Daily Progress Note        Subjective     Darshan Bhatt reports:   DOing OK. No new complaints. I am trying to walk everyday but still not 100% compliant with HEP  Objective   Full PROM R shoulder    See Exercise, Manual, and Modality Logs for complete treatment.       Assessment/Plan  Pt doing well despite not doing HEP. Reassess next visit    Progress per Plan of Care           Manual Therapy:  12       mins  38822;  Therapeutic Exercise: 35      mins  17852;     Neuromuscular Staci:       mins  32926;    Therapeutic Activity:         mins  22601;     Gait Training:         mins  21623;     Ultrasound:         mins  95278;    Electrical Stimulation:        mins  52260 ( );  Dry Needling         mins self-pay    Timed Treatment:   47   mins   Total Treatment:     57   mins    Dee Clark, PT  Physical Therapist  [place] License # [id number]

## 2018-04-18 ENCOUNTER — TREATMENT (OUTPATIENT)
Dept: PHYSICAL THERAPY | Facility: CLINIC | Age: 73
End: 2018-04-18

## 2018-04-18 DIAGNOSIS — M75.01 SECONDARY ADHESIVE CAPSULITIS OF SHOULDER, RIGHT: Primary | ICD-10-CM

## 2018-04-18 PROCEDURE — 97110 THERAPEUTIC EXERCISES: CPT | Performed by: PHYSICAL THERAPIST

## 2018-04-18 PROCEDURE — 97140 MANUAL THERAPY 1/> REGIONS: CPT | Performed by: PHYSICAL THERAPIST

## 2018-04-18 NOTE — PROGRESS NOTES
Physical Therapy Daily Progress Note        Subjective     Darshan Bhatt reports: I have improved. Still some slight pain intermittently R shoulder. Admits to not being good with HEP    Objective       Active Range of Motion     Right Shoulder   Flexion: 120 degrees   Abduction: 130 degrees   External rotation BTH: WFL  Internal rotation BTB: T12     Passive Range of Motion     Right Shoulder   Flexion: 155 degrees   Abduction: 140 degrees   External rotation 45°: 75 degrees      See Exercise, Manual, and Modality Logs for complete treatment.       Assessment/Plan  Much improved ROM and strength.     Anticipate DC next Visit           Manual Therapy:  10       mins  68605;  Therapeutic Exercise: 30      mins  32023;     Neuromuscular Staci:       mins  13425;    Therapeutic Activity:         mins  01116;     Gait Training:         mins  36405;     Ultrasound:         mins  69486;    Electrical Stimulation:        mins  89508 ( );  Dry Needling         mins self-pay    Timed Treatment:   40   mins   Total Treatment:     45   mins    Dee Clark, PT  Physical Therapist  [place] License # [id number]

## 2018-04-20 ENCOUNTER — OFFICE VISIT (OUTPATIENT)
Dept: PHYSICAL THERAPY | Facility: CLINIC | Age: 73
End: 2018-04-20

## 2018-04-20 DIAGNOSIS — M75.01 SECONDARY ADHESIVE CAPSULITIS OF SHOULDER, RIGHT: Primary | ICD-10-CM

## 2018-04-20 PROCEDURE — 97110 THERAPEUTIC EXERCISES: CPT | Performed by: PHYSICAL THERAPIST

## 2018-04-20 PROCEDURE — 97140 MANUAL THERAPY 1/> REGIONS: CPT | Performed by: PHYSICAL THERAPIST

## 2018-04-20 PROCEDURE — 97530 THERAPEUTIC ACTIVITIES: CPT | Performed by: PHYSICAL THERAPIST

## 2018-04-20 NOTE — PROGRESS NOTES
Physical Therapy Daily Progress Note    Time In 1038  Time Out 1150    Darshan Bhatt reports: right shoulder is continuing to slowly improve.  States that it is much better than it was and that he knows he needs to be more consistent with exercise performance.    Subjective     Objective   See Exercise, Manual, and Modality Logs for complete treatment.   Cane assisted ROM flex, abd, er, IR  Cane stretch IR, doorway pec stretch  scap ret saws, IR.ER, Ext, Flex with t band   Discussed postural awareness of shoulder blades with exercise/activity  Importance of making time to perform entire program 2 times day.  Exercise rationale, progression  Continuation of current HEP activities in addition to new ones issued.    Quick Dash reveals ~38% impairment of right shoulder with daily ADL's/functional activity.    Assessment/Plan Cooperative with rehab efforts, though reports subjectively that compliance in regards to home exercise performance could be better.  Encouraged to establish routine in order to ensure exercise compliance as well as to maintain results and maximize recovery. Exhibits good capability and performance of resistive band activities without increased right shoulder symptoms.  Quick Dash score reveals ~38% perceived impairment of R UE. ROM and mm strength improved versus initial measurements as noted by 4/18/18 measurements.   Patient has met all STG's and LTG's #1-#3.  Should continue to improve if exercise compliance increases.    Other  Discharge to [facility].            Manual Therapy:  15     mins  46082;  Therapeutic Exercise:   15   mins  70406;     Neuromuscular Staci:       mins  53454;    Therapeutic Activity:    25     mins  29792;     Gait Training:       mins  80174;     Ultrasound:         mins  28038;    Electrical Stimulation:        mins  36694 ( );      Timed Treatment:  55    mins   Total Treatment:    72   mins    Frantz Crfat PTA    Physical Therapist Assistant [place] [id number]

## 2018-05-01 ENCOUNTER — CLINICAL SUPPORT (OUTPATIENT)
Dept: FAMILY MEDICINE CLINIC | Facility: CLINIC | Age: 73
End: 2018-05-01

## 2018-05-01 DIAGNOSIS — E53.8 B12 DEFICIENCY: Primary | ICD-10-CM

## 2018-05-01 PROCEDURE — 96372 THER/PROPH/DIAG INJ SC/IM: CPT | Performed by: INTERNAL MEDICINE

## 2018-05-01 RX ORDER — CYANOCOBALAMIN 1000 UG/ML
1000 INJECTION, SOLUTION INTRAMUSCULAR; SUBCUTANEOUS
Status: DISCONTINUED | OUTPATIENT
Start: 2018-05-01 | End: 2019-02-20

## 2018-05-01 RX ADMIN — CYANOCOBALAMIN 1000 MCG: 1000 INJECTION, SOLUTION INTRAMUSCULAR; SUBCUTANEOUS at 11:33

## 2018-05-08 RX ORDER — LEVOTHYROXINE SODIUM 0.12 MG/1
TABLET ORAL
Qty: 90 TABLET | Refills: 0 | Status: SHIPPED | OUTPATIENT
Start: 2018-05-08 | End: 2018-09-10 | Stop reason: SDUPTHER

## 2018-07-03 RX ORDER — ATORVASTATIN CALCIUM 40 MG/1
40 TABLET, FILM COATED ORAL DAILY
Qty: 90 TABLET | Refills: 0 | Status: SHIPPED | OUTPATIENT
Start: 2018-07-03 | End: 2018-09-05 | Stop reason: SDUPTHER

## 2018-09-05 RX ORDER — ATORVASTATIN CALCIUM 40 MG/1
TABLET, FILM COATED ORAL
Qty: 90 TABLET | Refills: 0 | Status: SHIPPED | OUTPATIENT
Start: 2018-09-05 | End: 2018-10-02 | Stop reason: SDUPTHER

## 2018-09-10 RX ORDER — BUPROPION HYDROCHLORIDE 150 MG/1
150 TABLET ORAL DAILY
Qty: 30 TABLET | Refills: 0 | Status: SHIPPED | OUTPATIENT
Start: 2018-09-10 | End: 2018-10-02 | Stop reason: SDUPTHER

## 2018-09-10 RX ORDER — LEVOTHYROXINE SODIUM 0.12 MG/1
125 TABLET ORAL DAILY
Qty: 30 TABLET | Refills: 0 | Status: SHIPPED | OUTPATIENT
Start: 2018-09-10 | End: 2018-10-02 | Stop reason: SDUPTHER

## 2018-09-25 ENCOUNTER — OFFICE VISIT (OUTPATIENT)
Dept: FAMILY MEDICINE CLINIC | Facility: CLINIC | Age: 73
End: 2018-09-25

## 2018-09-25 VITALS
TEMPERATURE: 98.6 F | HEIGHT: 72 IN | HEART RATE: 50 BPM | RESPIRATION RATE: 16 BRPM | WEIGHT: 207.4 LBS | DIASTOLIC BLOOD PRESSURE: 68 MMHG | SYSTOLIC BLOOD PRESSURE: 108 MMHG | BODY MASS INDEX: 28.09 KG/M2 | OXYGEN SATURATION: 98 %

## 2018-09-25 DIAGNOSIS — I48.0 PAF (PAROXYSMAL ATRIAL FIBRILLATION) (HCC): ICD-10-CM

## 2018-09-25 DIAGNOSIS — C43.9 MALIGNANT MELANOMA OF SKIN (HCC): ICD-10-CM

## 2018-09-25 DIAGNOSIS — E53.8 VITAMIN B12 DEFICIENCY: ICD-10-CM

## 2018-09-25 DIAGNOSIS — E03.9 HYPOTHYROIDISM, UNSPECIFIED TYPE: ICD-10-CM

## 2018-09-25 DIAGNOSIS — R44.1 VISUAL HALLUCINATIONS: ICD-10-CM

## 2018-09-25 DIAGNOSIS — Z00.00 ENCOUNTER FOR MEDICARE ANNUAL WELLNESS EXAM: Primary | ICD-10-CM

## 2018-09-25 DIAGNOSIS — N40.0 BENIGN PROSTATIC HYPERPLASIA, UNSPECIFIED WHETHER LOWER URINARY TRACT SYMPTOMS PRESENT: ICD-10-CM

## 2018-09-25 DIAGNOSIS — E55.9 HYPOVITAMINOSIS D: ICD-10-CM

## 2018-09-25 DIAGNOSIS — R25.1 TREMOR: ICD-10-CM

## 2018-09-25 DIAGNOSIS — I25.10 CORONARY ARTERIOSCLEROSIS IN NATIVE ARTERY: ICD-10-CM

## 2018-09-25 DIAGNOSIS — M75.01 ADHESIVE CAPSULITIS OF RIGHT SHOULDER: ICD-10-CM

## 2018-09-25 DIAGNOSIS — Z86.39 HISTORY OF HYPERGLYCEMIA: ICD-10-CM

## 2018-09-25 DIAGNOSIS — Z23 NEED FOR VACCINE FOR TD (TETANUS-DIPHTHERIA): ICD-10-CM

## 2018-09-25 DIAGNOSIS — E78.01 FAMILIAL HYPERCHOLESTEROLEMIA: ICD-10-CM

## 2018-09-25 DIAGNOSIS — R41.840 ATTENTION DEFICIT: ICD-10-CM

## 2018-09-25 DIAGNOSIS — F33.0 MILD EPISODE OF RECURRENT MAJOR DEPRESSIVE DISORDER (HCC): ICD-10-CM

## 2018-09-25 DIAGNOSIS — L84 FOOT CALLUS: ICD-10-CM

## 2018-09-25 DIAGNOSIS — Z79.01 CHRONIC ANTICOAGULATION: ICD-10-CM

## 2018-09-25 PROBLEM — K55.20 ANGIODYSPLASIA OF COLON WITHOUT BLEEDING: Status: ACTIVE | Noted: 2017-11-28

## 2018-09-25 PROBLEM — K62.6 STERCORAL ULCER OF RECTUM: Status: ACTIVE | Noted: 2017-11-28

## 2018-09-25 PROCEDURE — 90471 IMMUNIZATION ADMIN: CPT | Performed by: FAMILY MEDICINE

## 2018-09-25 PROCEDURE — G0438 PPPS, INITIAL VISIT: HCPCS | Performed by: FAMILY MEDICINE

## 2018-09-25 PROCEDURE — 99214 OFFICE O/P EST MOD 30 MIN: CPT | Performed by: FAMILY MEDICINE

## 2018-09-25 PROCEDURE — 96160 PT-FOCUSED HLTH RISK ASSMT: CPT | Performed by: FAMILY MEDICINE

## 2018-09-25 PROCEDURE — 90715 TDAP VACCINE 7 YRS/> IM: CPT | Performed by: FAMILY MEDICINE

## 2018-09-25 RX ORDER — UBIDECARENONE 100 MG
300 CAPSULE ORAL DAILY
COMMUNITY
End: 2019-02-20

## 2018-09-25 RX ORDER — FLUTICASONE PROPIONATE 50 MCG
2 SPRAY, SUSPENSION (ML) NASAL 2 TIMES DAILY
Start: 2018-09-25 | End: 2021-05-25 | Stop reason: SDUPTHER

## 2018-09-25 NOTE — PATIENT INSTRUCTIONS
Discontinue Benadryl/diphenhydramine for sleep and try melatonin up to 20 mg nightly    Discontinue Sudafed/pseudoephedrine for periodiccongestion and instead either OTC Sudafed PE Congestion (phenylephrine)  OR Dimetapp [facility] Cold & Allergy (this has phenylephrine and brompheniramine,decongestant and drying agent to help post nasal drainage      Ask your doctors to send us notes when they see you    Labs today; we will contact you with results    We will arrange consults with orthopedics, podiatry and neurology  If you do not hear back on these this week, call us    You are given tetanus/diphtheria vaccine today    Talk with your pharmacist about both the new shingles vaccine (Shingrix) and the Hepatitis A vaccine.  Both are two injections, six months apart.  I highly recommend this.

## 2018-09-25 NOTE — PROGRESS NOTES
QUICK REFERENCE INFORMATION:  The ABCs of the Annual Wellness Visit    Initial Medicare Wellness Visit    HEALTH RISK ASSESSMENT    1945    Recent Hospitalizations:  Recently treated at the following:  Other: [facility] with fecal impaction.        Current Medical Providers:  Patient Care Team:  Adarsh Pratt MD as PCP - General (Internal Medicine)        Smoking Status:  History   Smoking Status   • Former Smoker   Smokeless Tobacco   • Former User       Alcohol Consumption:  History   Alcohol Use   • Yes       Depression Screen:   PHQ-2/PHQ-9 Depression Screening 9/25/2018   Little interest or pleasure in doing things 1   Feeling down, depressed, or hopeless 1   Trouble falling or staying asleep, or sleeping too much 1   Feeling tired or having little energy 2   Poor appetite or overeating 1   Feeling bad about yourself - or that you are a failure or have let yourself or your family down 1   Trouble concentrating on things, such as reading the newspaper or watching television 3   Moving or speaking so slowly that other people could have noticed. Or the opposite - being so fidgety or restless that you have been moving around a lot more than usual 0   Thoughts that you would be better off dead, or of hurting yourself in some way 0   Total Score 10   If you checked off any problems, how difficult have these problems made it for you to do your work, take care of things at home, or get along with other people? Not difficult at all       Health Habits and Functional and Cognitive Screening:  Functional & Cognitive Status 9/25/2018   Do you have difficulty preparing food and eating? No   Do you have difficulty bathing yourself, getting dressed or grooming yourself? No   Do you have difficulty using the toilet? No   Do you have difficulty moving around from place to place? No   Do you have trouble with steps or getting out of a bed or a chair? Yes   In the past year have you fallen or experienced a near  fall? No   Current Diet Well Balanced Diet   Dental Exam Up to date   Eye Exam Up to date   Exercise (times per week) 4 times per week   Current Exercise Activities Include Walking   Do you need help using the phone?  No   Are you deaf or do you have serious difficulty hearing?  No   Do you need help with transportation? No   Do you need help shopping? No   Do you need help preparing meals?  No   Do you need help with housework?  No   Do you need help with laundry? No   Do you need help taking your medications? No   Do you need help managing money? No   Do you ever drive or ride in a car without wearing a seat belt? No   Have you felt unusual stress, anger or loneliness in the last month? Yes   Who do you live with? Alone   If you need help, do you have trouble finding someone available to you? No   Have you been bothered in the last four weeks by sexual problems? No   Do you have difficulty concentrating, remembering or making decisions? Yes           Does the patient have evidence of cognitive impairment? Yes    Asiprin use counseling: Taking ASA appropriately as indicated      Recent Lab Results:    Visual Acuity:  No exam data present    Age-appropriate Screening Schedule:  Refer to the list below for future screening recommendations based on patient's age, sex and/or medical conditions. Orders for these recommended tests are listed in the plan section. The patient has been provided with a written plan.    Health Maintenance   Topic Date Due   • INFLUENZA VACCINE  08/01/2018   • LIPID PANEL  03/19/2019   • COLONOSCOPY  11/05/2022   • TDAP/TD VACCINES (2 - Td) 08/24/2027   • PNEUMOCOCCAL VACCINES (65+ LOW/MEDIUM RISK)  Completed   • ZOSTER VACCINE  Completed        Subjective   History of Present Illness    Darshan Bhatt is a 72 y.o. male who presents for an Annual Wellness Visit.  He has multiple other concerns.  This is his first visit with me.    He has been having problems intermittently for the last 2 years  with what he describes as visual hallucinations.  He gives as an example waking during the night and looking over to where he has found some clothing and thinking that it is than a person.  He says it is very real to him and seems to be conveying a concern about the reality of what he is seen.  Additionally he has been having problems with lateral upper extremity tremor.  He gets it at rest but says it's worse with movement.  He says that he is having problems focusing on things like reading.  He has to go back and re-read because he loses his train of thought.  He has difficulty with sleep and takes Benadryl nightly for that.    Despite having had the REZUM procedure continues to have severe problems from his BPH.  He gets up multiple times during the night.  Small voids.  Does however still use Sudafed.    He's still having problems with his right shoulder with limited motion because of adhesive capsulitis.  This was treated in physical therapy sometime ago.  He is wondering if something else can be done.  Has history of multiple level I melanomas and is followed regularly by [facility].  Had total body skin survey yesterday.  Is a callused area on the bottom of his foot that is causing him a lot of difficulty.  Has never seen podiatry.  He says that his depression isn't bad but it's not the best it's ever been.    The following portions of the patient's history were reviewed and updated as appropriate: allergies, current medications, past family history, past medical history, past social history, past surgical history and problem list.    Outpatient Medications Prior to Visit   Medication Sig Dispense Refill   • aspirin 81 MG tablet Take 81 mg by mouth.     • atorvastatin (LIPITOR) 40 MG tablet TAKE 1 TABLET EVERY DAY 90 tablet 0   • buPROPion XL (WELLBUTRIN XL) 150 MG 24 hr tablet Take 1 tablet by mouth Daily. MUST HAVE LABS FOLLOWED BY OV THIS MONTH 30 tablet 0   • cetirizine (zyrTEC) 10 MG tablet Take 10 mg  by mouth Daily.     • clotrimazole-betamethasone (LOTRISONE) 1-0.05 % cream Apply  topically to the appropriate area as directed Every 12 (Twelve) Hours. 15 g 0   • levothyroxine (SYNTHROID, LEVOTHROID) 125 MCG tablet Take 1 tablet by mouth Daily. 1 tab po QDay for thyroid MUST HAVE LABS FOLLOWED BY OV THIS MONTH 30 tablet 0   • mupirocin (BACTROBAN) 2 % ointment Apply  topically to the appropriate area as directed 3 (Three) Times a Day. 15 g 0   • nitroglycerin (NITROSTAT) 0.3 MG SL tablet Place  under the tongue.     • pseudoephedrine (SUDAFED) 30 MG tablet Take 30 mg by mouth.     • sotalol (BETAPACE) 80 MG tablet Take  by mouth 2 (two) times a day.     • terbinafine (LamiSIL) 250 MG tablet TAKE ONE TABLET BY MOUTH DAILY AS DIRECTED 30 tablet 2   • XARELTO 20 MG tablet Take 1 tablet by mouth Daily With Dinner. 90 tablet 3   • diphenhydrAMINE (BENADRYL) 50 MG capsule Take 50 mg by mouth Every 6 (Six) Hours As Needed for Itching.     • fluticasone (FLONASE) 50 MCG/ACT nasal spray 2 sprays into each nostril Daily.     • silodosin (RAPAFLO) 8 MG capsule capsule Take 8 mg by mouth Daily With Breakfast.       Facility-Administered Medications Prior to Visit   Medication Dose Route Frequency Provider Last Rate Last Dose   • cyanocobalamin injection 1,000 mcg  1,000 mcg Intramuscular Q28 Days Don Sherwood APRN   1,000 mcg at 12/06/16 1422   • cyanocobalamin injection 1,000 mcg  1,000 mcg Intramuscular Q28 Days Don Sherwood APRN   1,000 mcg at 04/18/17 1043   • cyanocobalamin injection 1,000 mcg  1,000 mcg Intramuscular Q28 Days Don Sherwood APRN   1,000 mcg at 06/02/17 1119   • cyanocobalamin injection 1,000 mcg  1,000 mcg Intramuscular Q28 Days Adarsh Pratt MD   1,000 mcg at 03/20/18 1043   • cyanocobalamin injection 1,000 mcg  1,000 mcg Intramuscular Q28 Days Adarsh Pratt MD   1,000 mcg at 04/09/18 1104   • cyanocobalamin injection 1,000 mcg  1,000 mcg Intramuscular Q28 Days Adarsh Pratt MD   1,000 mcg at  05/01/18 1133       Patient Active Problem List   Diagnosis   • PAF (paroxysmal atrial fibrillation) (CMS/[facility])   • Anxiety   • Coronary arteriosclerosis in native artery   • Benign prostatic hyperplasia   • Depression   • Dizziness   • Hemorrhoids   • Hyperlipidemia   • Hypothyroidism   • Malignant melanoma of skin (CMS/HCC)   • Sleep apnea   • Vitamin B12 deficiency   • Angiodysplasia of colon without bleeding   • Chronic anticoagulation   • Stercoral ulcer of rectum       Advance Care Planning:  has NO advance directive - information provided to the patient today    Identification of Risk Factors:  Risk factors include: weight , increased fall risk, depression and cognitive impairment.    Review of Systems   Constitutional: Negative for chills and fever.   HENT: Negative for congestion, rhinorrhea and sore throat.    Eyes: Negative for discharge and visual disturbance.   Respiratory: Negative for cough and shortness of breath.    Cardiovascular: Negative for chest pain and palpitations.   Gastrointestinal: Negative for abdominal pain, constipation, diarrhea, nausea and vomiting.   Endocrine: Negative for polydipsia.   Genitourinary: Positive for difficulty urinating. Negative for dysuria and hematuria.   Musculoskeletal: Negative for arthralgias and myalgias.   Skin: Negative for rash.        Callus on foot     Allergic/Immunologic: Negative.    Neurological: Positive for tremors. Negative for weakness, numbness and headaches.        Attention deficit     Hematological: Does not bruise/bleed easily.   Psychiatric/Behavioral: Positive for sleep disturbance. Negative for dysphoric mood. The patient is not nervous/anxious.    All other systems reviewed and are negative.      Compared to one year ago, the patient feels his physical health is worse.  Compared to one year ago, the patient feels his mental health is worse.    Objective     Physical Exam   Constitutional: He is oriented to person, place, and time. He  "appears well-developed and well-nourished.   HENT:   Head: Normocephalic and atraumatic.   Right Ear: External ear normal.   Left Ear: External ear normal.   Mouth/Throat: No oropharyngeal exudate.   Eyes: Pupils are equal, round, and reactive to light. Conjunctivae, EOM and lids are normal. Right eye exhibits no discharge. Left eye exhibits no discharge. No scleral icterus.   Neck: Trachea normal, normal range of motion and phonation normal. Neck supple. No thyromegaly present.   Cardiovascular: Normal rate, regular rhythm and normal heart sounds.  Exam reveals no gallop and no friction rub.    No murmur heard.  Pulmonary/Chest: Effort normal and breath sounds normal. No stridor. He has no wheezes. He has no rales.   Abdominal: Soft. He exhibits no distension. There is no tenderness.   Musculoskeletal: Normal range of motion. He exhibits no edema.   Lymphadenopathy:     He has no cervical adenopathy.   Neurological: He is alert and oriented to person, place, and time. He has normal strength. No cranial nerve deficit or sensory deficit. Coordination normal.   Slow speech and masklike facies.  I appreciate no tremor today either at rest or intention.  Cerebellar testing is intact     Skin: Skin is warm, dry and intact. No petechiae and no rash noted. No cyanosis. Nails show no clubbing.   Psychiatric: Judgment and thought content normal. His affect is blunt. His speech is delayed. He is slowed. He is not actively hallucinating (not actively now but comlains of visual hallucinations). Cognition and memory are normal.   Nursing note and vitals reviewed.      Vitals:    09/25/18 1505   BP: 108/68   Pulse: 50   Resp: 16   Temp: 98.6 °F (37 °C)   TempSrc: Oral   SpO2: 98%   Weight: 94.1 kg (207 lb 6.4 oz)   Height: 182.9 cm (72.01\")   PainSc: 0-No pain       Patient's Body mass index is 28.12 kg/m². BMI is above normal parameters. Recommendations include: deferred while we evaluate his other more acute " problems..      Assessment/Plan   Patient Self-Management and Personalized Health Advice  The patient has been provided with information about: medication safety and changes and preventive services including:   · Advance directive, Influenza vaccine.    The overall immediate impression when I  walk in the room with him is that he somewhat parkinsonian.  With the hallucinations his mental status complaints the tremor and these features I certainly think that more compresses evaluation including neurological referral is mandatory.  Recommendations are made and studies ordered.    Visit Diagnoses:    ICD-10-CM ICD-9-CM   1. Encounter for Medicare annual wellness exam Z00.00 V70.0   2. Tremor R25.1 781.0   3. Attention deficit R41.840 799.51   4. Visual hallucinations R44.1 368.16   5. Coronary arteriosclerosis in native artery I25.10 414.01   6. Familial hypercholesterolemia E78.01 272.0   7. PAF (paroxysmal atrial fibrillation) (CMS/[facility]) I48.0 427.31   8. Hypothyroidism, unspecified type E03.9 244.9   9. Vitamin B12 deficiency E53.8 266.2   10. Malignant melanoma of skin (CMS/[facility]) C43.9 172.9   11. Benign prostatic hyperplasia, unspecified whether lower urinary tract symptoms present N40.0 600.00   12. Chronic anticoagulation Z79.01 V58.61   13. Mild episode of recurrent major depressive disorder (CMS/[facility]) F33.0 296.31   14. Adhesive capsulitis of right shoulder M75.01 726.0   15. Foot callus L84 700   16. Hypovitaminosis D E55.9 268.9   17. History of hyperglycemia Z86.39 V12.29   18. Need for vaccine for TD (tetanus-diphtheria) Z23 V06.5       Orders Placed This Encounter   Procedures   • MRI Brain With & Without Contrast     Standing Status:   Future     Standing Expiration Date:   9/25/2019   • Td (adult) Vaccine Not Adsorbed   • TSH Rfx On Abnormal To Free T4   • RPR   • Vitamin B12   • Folate   • Hemoglobin A1c   • Comprehensive Metabolic Panel   • Vitamin D 25 Hydroxy   • Ambulatory Referral to Orthopedic  Surgery     Referral Priority:   Routine     Referral Type:   Consultation     Referral Reason:   Specialty Services Required     Referred to Provider:   Antwon Ibarra MD     Requested Specialty:   Orthopedic Surgery     Number of Visits Requested:   1   • Ambulatory Referral to Neurology     Referral Priority:   Routine     Referral Type:   Consultation     Referral Reason:   Specialty Services Required     Requested Specialty:   Neurology     Number of Visits Requested:   1   • Ambulatory Referral to Podiatry     Referral Priority:   Routine     Referral Type:   Consultation     Referral Reason:   Specialty Services Required     Referred to Provider:   Katrina Nassar DPM     Requested Specialty:   Podiatry     Number of Visits Requested:   1   • CBC & Differential     Order Specific Question:   Manual Differential     Answer:   No       Outpatient Encounter Prescriptions as of 9/25/2018   Medication Sig Dispense Refill   • aspirin 81 MG tablet Take 81 mg by mouth.     • atorvastatin (LIPITOR) 40 MG tablet TAKE 1 TABLET EVERY DAY 90 tablet 0   • buPROPion XL (WELLBUTRIN XL) 150 MG 24 hr tablet Take 1 tablet by mouth Daily. MUST HAVE LABS FOLLOWED BY OV THIS MONTH 30 tablet 0   • cetirizine (zyrTEC) 10 MG tablet Take 10 mg by mouth Daily.     • clotrimazole-betamethasone (LOTRISONE) 1-0.05 % cream Apply  topically to the appropriate area as directed Every 12 (Twelve) Hours. 15 g 0   • coenzyme Q10 100 MG capsule Take 300 mg by mouth Daily.     • fluticasone (FLONASE) 50 MCG/ACT nasal spray 2 sprays into the nostril(s) as directed by provider 2 (Two) Times a Day.     • levothyroxine (SYNTHROID, LEVOTHROID) 125 MCG tablet Take 1 tablet by mouth Daily. 1 tab po QDay for thyroid MUST HAVE LABS FOLLOWED BY OV THIS MONTH 30 tablet 0   • mupirocin (BACTROBAN) 2 % ointment Apply  topically to the appropriate area as directed 3 (Three) Times a Day. 15 g 0   • nitroglycerin (NITROSTAT) 0.3 MG SL tablet Place  under  the tongue.     • sotalol (BETAPACE) 80 MG tablet Take  by mouth 2 (two) times a day.     • terbinafine (LamiSIL) 250 MG tablet TAKE ONE TABLET BY MOUTH DAILY AS DIRECTED 30 tablet 2   • XARELTO 20 MG tablet Take 1 tablet by mouth Daily With Dinner. 90 tablet 3   • [DISCONTINUED] diphenhydrAMINE (BENADRYL) 50 MG capsule Take 50 mg by mouth Every 6 (Six) Hours As Needed for Itching.     • [DISCONTINUED] fluticasone (FLONASE) 50 MCG/ACT nasal spray 2 sprays into each nostril Daily.     • [DISCONTINUED] pseudoephedrine (SUDAFED) 30 MG tablet Take 30 mg by mouth.     • Mirabegron ER (MYRBETRIQ) 50 MG tablet sustained-release 24 hour 24 hr tablet 1 tab po QDay for urinary symptoms     • [DISCONTINUED] silodosin (RAPAFLO) 8 MG capsule capsule Take 8 mg by mouth Daily With Breakfast.       Facility-Administered Encounter Medications as of 9/25/2018   Medication Dose Route Frequency Provider Last Rate Last Dose   • cyanocobalamin injection 1,000 mcg  1,000 mcg Intramuscular Q28 Days Don Sherwood APRN   1,000 mcg at 12/06/16 1422   • cyanocobalamin injection 1,000 mcg  1,000 mcg Intramuscular Q28 Days Don Sherwood APRN   1,000 mcg at 04/18/17 1043   • cyanocobalamin injection 1,000 mcg  1,000 mcg Intramuscular Q28 Days Don Sherwood APRN   1,000 mcg at 06/02/17 1119   • cyanocobalamin injection 1,000 mcg  1,000 mcg Intramuscular Q28 Days Adarsh Pratt MD   1,000 mcg at 03/20/18 1043   • cyanocobalamin injection 1,000 mcg  1,000 mcg Intramuscular Q28 Days Adarsh Pratt MD   1,000 mcg at 04/09/18 1104   • cyanocobalamin injection 1,000 mcg  1,000 mcg Intramuscular Q28 Days Adarsh Pratt MD   1,000 mcg at 05/01/18 1133       Reviewed use of high risk medication in the elderly: yes  Reviewed for potential of harmful drug interactions in the elderly: yes    Follow Up:  Return in about 5 months (around 2/25/2019) for Next scheduled follow up.     An After Visit Summary and PPPS with all of these plans were given to the  patient.      Patient Instructions   Discontinue Benadryl/diphenhydramine for sleep and try melatonin up to 20 mg nightly    Discontinue Sudafed/pseudoephedrine for periodiccongestion and instead either OTC Sudafed PE Congestion (phenylephrine)  OR Dimet[employer] [facility]s Cold & Allergy (this has phenylephrine and brompheniramine,decongestant and drying agent to help post nasal drainage      Ask your doctors to send us notes when they see you    Labs today; we will contact you with results    We will arrange consults with orthopedics, podiatry and neurology  If you do not hear back on these this week, call us    You are given tetanus/diphtheria vaccine today    Talk with your pharmacist about both the new shingles vaccine (Shingrix) and the Hepatitis A vaccine.  Both are two injections, six months apart.  I highly recommend this.

## 2018-09-25 NOTE — PROGRESS NOTES
Krish Bhatt is a 72 y.o. male.     History of Present Illness     {Common H&P Review Areas:20640}    Review of Systems   Psychiatric/Behavioral: Positive for hallucinations.   All other systems reviewed and are negative.      Objective   Physical Exam    Assessment/Plan   {Assess/PlanSmartLinks:27768}    EMR Dragon/Transcription disclaimer:   Much of this encounter note is an electronic transcription/translation of spoken language to printed text. The electronic translation of spoken language may permit erroneous, or at times, nonsensical words or phrases to be inadvertently transcribed; Although I have reviewed the note for such errors, some may still exist. Please contact me with any questions or concerns about the conduct of this encounter note.

## 2018-09-26 LAB
25(OH)D3+25(OH)D2 SERPL-MCNC: 30.4 NG/ML (ref 30–100)
ALBUMIN SERPL-MCNC: 4.4 G/DL (ref 3.5–5.2)
ALBUMIN/GLOB SERPL: 1.6 G/DL
ALP SERPL-CCNC: 65 U/L (ref 39–117)
ALT SERPL-CCNC: 20 U/L (ref 1–41)
AST SERPL-CCNC: 17 U/L (ref 1–40)
BASOPHILS # BLD AUTO: 0.02 10*3/MM3 (ref 0–0.2)
BASOPHILS NFR BLD AUTO: 0.3 % (ref 0–1.5)
BILIRUB SERPL-MCNC: 0.8 MG/DL (ref 0.1–1.2)
BUN SERPL-MCNC: 18 MG/DL (ref 8–23)
BUN/CREAT SERPL: 14.9 (ref 7–25)
CALCIUM SERPL-MCNC: 9.5 MG/DL (ref 8.6–10.5)
CHLORIDE SERPL-SCNC: 101 MMOL/L (ref 98–107)
CO2 SERPL-SCNC: 29.6 MMOL/L (ref 22–29)
CREAT SERPL-MCNC: 1.21 MG/DL (ref 0.76–1.27)
EOSINOPHIL # BLD AUTO: 0.26 10*3/MM3 (ref 0–0.7)
EOSINOPHIL NFR BLD AUTO: 4.1 % (ref 0.3–6.2)
ERYTHROCYTE [DISTWIDTH] IN BLOOD BY AUTOMATED COUNT: 13.4 % (ref 11.5–14.5)
FOLATE SERPL-MCNC: 9.34 NG/ML (ref 4.78–24.2)
GLOBULIN SER CALC-MCNC: 2.8 GM/DL
GLUCOSE SERPL-MCNC: 80 MG/DL (ref 65–99)
HBA1C MFR BLD: 5.7 % (ref 4.8–5.6)
HCT VFR BLD AUTO: 46.7 % (ref 40.4–52.2)
HGB BLD-MCNC: 14.9 G/DL (ref 13.7–17.6)
IMM GRANULOCYTES # BLD: 0 10*3/MM3 (ref 0–0.03)
IMM GRANULOCYTES NFR BLD: 0 % (ref 0–0.5)
LYMPHOCYTES # BLD AUTO: 1.95 10*3/MM3 (ref 0.9–4.8)
LYMPHOCYTES NFR BLD AUTO: 30.6 % (ref 19.6–45.3)
MCH RBC QN AUTO: 30.2 PG (ref 27–32.7)
MCHC RBC AUTO-ENTMCNC: 31.9 G/DL (ref 32.6–36.4)
MCV RBC AUTO: 94.7 FL (ref 79.8–96.2)
MONOCYTES # BLD AUTO: 0.74 10*3/MM3 (ref 0.2–1.2)
MONOCYTES NFR BLD AUTO: 11.6 % (ref 5–12)
NEUTROPHILS # BLD AUTO: 3.41 10*3/MM3 (ref 1.9–8.1)
NEUTROPHILS NFR BLD AUTO: 53.4 % (ref 42.7–76)
PLATELET # BLD AUTO: 149 10*3/MM3 (ref 140–500)
POTASSIUM SERPL-SCNC: 4.7 MMOL/L (ref 3.5–5.2)
PROT SERPL-MCNC: 7.2 G/DL (ref 6–8.5)
RBC # BLD AUTO: 4.93 10*6/MM3 (ref 4.6–6)
RPR SER QL: NORMAL
SODIUM SERPL-SCNC: 143 MMOL/L (ref 136–145)
TSH SERPL DL<=0.005 MIU/L-ACNC: 2.34 MIU/ML (ref 0.27–4.2)
VIT B12 SERPL-MCNC: 350 PG/ML (ref 211–946)
WBC # BLD AUTO: 6.38 10*3/MM3 (ref 4.5–10.7)

## 2018-10-02 RX ORDER — ATORVASTATIN CALCIUM 40 MG/1
TABLET, FILM COATED ORAL
Qty: 90 TABLET | Refills: 1 | Status: SHIPPED | OUTPATIENT
Start: 2018-10-02 | End: 2018-11-07 | Stop reason: SDUPTHER

## 2018-10-02 RX ORDER — BUPROPION HYDROCHLORIDE 150 MG/1
150 TABLET ORAL DAILY
Qty: 90 TABLET | Refills: 1 | Status: SHIPPED | OUTPATIENT
Start: 2018-10-02 | End: 2019-03-25 | Stop reason: SDUPTHER

## 2018-10-02 RX ORDER — SOTALOL HYDROCHLORIDE 80 MG/1
TABLET ORAL
Qty: 180 TABLET | Refills: 1 | Status: SHIPPED | OUTPATIENT
Start: 2018-10-02 | End: 2020-09-25 | Stop reason: SDUPTHER

## 2018-10-02 RX ORDER — LEVOTHYROXINE SODIUM 0.12 MG/1
TABLET ORAL
Qty: 90 TABLET | Refills: 1 | Status: SHIPPED | OUTPATIENT
Start: 2018-10-02 | End: 2019-04-19 | Stop reason: SDUPTHER

## 2018-10-15 ENCOUNTER — OFFICE VISIT (OUTPATIENT)
Dept: NEUROLOGY | Facility: CLINIC | Age: 73
End: 2018-10-15

## 2018-10-15 VITALS
WEIGHT: 204 LBS | BODY MASS INDEX: 27.63 KG/M2 | HEART RATE: 72 BPM | HEIGHT: 72 IN | SYSTOLIC BLOOD PRESSURE: 115 MMHG | DIASTOLIC BLOOD PRESSURE: 80 MMHG | OXYGEN SATURATION: 94 %

## 2018-10-15 DIAGNOSIS — G20 PRIMARY PARKINSONISM (HCC): Primary | ICD-10-CM

## 2018-10-15 PROCEDURE — 99204 OFFICE O/P NEW MOD 45 MIN: CPT | Performed by: PSYCHIATRY & NEUROLOGY

## 2018-10-15 NOTE — PROGRESS NOTES
"Darshan Bhatt is a 72 y.o. male presents for   Chief Complaint   Patient presents with   • Tremors             CHIEF COMPLAINT:  Neurological consultation from Dr. Edouard Sharpe complaint tremors    History of Present Illness  At 72-year-old left-handed male, who is here alone referred for neurological consultation regarding possibility of Parkinson's disease  Patient arrives almost 50 minutes late for his appointment and without his history sheets.  We did accommodate him after he filled out the history sheets  Patient filled out the history she is which were reviewed with him.  Available records were also reviewed.  Patient has had outside the brain MRI scan on October 2 2018, we called and obtained a copy of that report.  History from the patient indicates \"I have slight tremor for 6 months\".  This has been slowly progressive.  Louie was a lost at least to 3 years he been having visual hallucinations.  Of interest he usually wakes up with these visual hallucinations, hr  goes  back to sleep, and he never experiences during his awake hours.  He has not been driving for a while.  He denies any change in his speech or his gait.  Denies any trouble with his memory.  He is not to have B12 deficiency but he is on monthly injections.  He has urinary frequency without urgency but occasionally he experiences \"leak\" that he thinks is from the water pills.  He has had history of constipation, one time he was admitted to emergency room for manual desimpaction, he has occasional dizziness going upstairs but denies trouble with falls.  Comprehensive neurological review [place] I have independently reviewed the review of systems past family social history and his medications.    The following portions of the patient's history were reviewed and updated as appropriate: allergies, current medications, past family history, past medical history, past social history, past surgical history and problem " list.        Review of Systems   Constitutional: Positive for fatigue. Negative for activity change, appetite change, chills, diaphoresis, fever and unexpected weight change.   HENT: Positive for congestion, dental problem, drooling, rhinorrhea and voice change. Negative for ear discharge, ear pain, facial swelling, hearing loss, mouth sores, nosebleeds, postnasal drip, sinus pain, sinus pressure, sneezing, sore throat, tinnitus and trouble swallowing.    Eyes: Positive for visual disturbance. Negative for photophobia, pain, discharge, redness and itching.   Respiratory: Positive for apnea. Negative for cough, choking, chest tightness, shortness of breath, wheezing and stridor.    Cardiovascular: Negative.    Gastrointestinal: Positive for constipation. Negative for abdominal distention, abdominal pain, anal bleeding, blood in stool, diarrhea, nausea, rectal pain and vomiting.   Endocrine: Negative.    Genitourinary: Positive for difficulty urinating, frequency and urgency. Negative for decreased urine volume, discharge, dysuria, enuresis, flank pain, genital sores, hematuria, penile pain, penile swelling, scrotal swelling and testicular pain.   Musculoskeletal: Positive for back pain. Negative for arthralgias, gait problem, joint swelling, myalgias, neck pain and neck stiffness.   Skin: Negative.    Allergic/Immunologic: Positive for environmental allergies. Negative for food allergies and immunocompromised state.   Neurological: Positive for dizziness and tremors. Negative for seizures, syncope, facial asymmetry, speech difficulty, weakness, light-headedness, numbness and headaches.   Hematological: Negative.    Psychiatric/Behavioral: Positive for decreased concentration and hallucinations. Negative for agitation, behavioral problems, confusion, dysphoric mood, self-injury, sleep disturbance and suicidal ideas. The patient is nervous/anxious. The patient is not hyperactive.          Past Medical History:    Diagnosis Date   • A-fib (CMS/HCC)    • Allergic    • BPH (benign prostatic hyperplasia)    • CAD (coronary artery disease)    • Cancer (CMS/HCC)     skin   • Erectile dysfunction    • Hyperlipidemia    • Hypogonadism in male    • Hypothyroidism    • Movement disorder    • Sleep apnea    • Thyroid disease          Social History     Social History   • Marital status:      Spouse name: N/A   • Number of children: N/A   • Years of education: N/A     Occupational History   • Not on file.     Social History Main Topics   • Smoking status: Former Smoker   • Smokeless tobacco: Former User   • Alcohol use Yes   • Drug use: Unknown   • Sexual activity: Defer     Other Topics Concern   • Not on file     Social History Narrative   • No narrative on file          Family History   Problem Relation Age of Onset   • Heart disease Mother    • Diabetes Father    • Diabetes Paternal Grandmother            Current Outpatient Prescriptions:   •  aspirin 81 MG tablet, Take 81 mg by mouth., Disp: , Rfl:   •  atorvastatin (LIPITOR) 40 MG tablet, 1 tab po QPM for lipids and risk reduction, Disp: 90 tablet, Rfl: 1  •  buPROPion XL (WELLBUTRIN XL) 150 MG 24 hr tablet, Take 1 tablet by mouth Daily., Disp: 90 tablet, Rfl: 1  •  cetirizine (zyrTEC) 10 MG tablet, Take 10 mg by mouth Daily., Disp: , Rfl:   •  coenzyme Q10 100 MG capsule, Take 300 mg by mouth Daily., Disp: , Rfl:   •  fluticasone (FLONASE) 50 MCG/ACT nasal spray, 2 sprays into the nostril(s) as directed by provider 2 (Two) Times a Day., Disp: , Rfl:   •  levothyroxine (SYNTHROID, LEVOTHROID) 125 MCG tablet, 1 tab po QDay for thyroid, Disp: 90 tablet, Rfl: 1  •  Mirabegron ER (MYRBETRIQ) 50 MG tablet sustained-release 24 hour 24 hr tablet, 1 tab po QDay for urinary symptoms, Disp: 90 tablet, Rfl: 1  •  nitroglycerin (NITROSTAT) 0.3 MG SL tablet, Place  under the tongue., Disp: , Rfl:   •  rivaroxaban (XARELTO) 20 MG tablet, Take 1 tablet by mouth Daily With Dinner., Disp:  90 tablet, Rfl: 1  •  sotalol (BETAPACE) 80 MG tablet, 1 tab po BID, Disp: 180 tablet, Rfl: 1  •  carbidopa-levodopa (SINEMET)  MG per tablet, Take 1 tablet by mouth 3 (Three) Times a Day., Disp: 90 tablet, Rfl: 2    Current Facility-Administered Medications:   •  cyanocobalamin injection 1,000 mcg, 1,000 mcg, Intramuscular, Q28 Days, Don Sherwood APRN, 1,000 mcg at 12/06/16 1422  •  cyanocobalamin injection 1,000 mcg, 1,000 mcg, Intramuscular, Q28 Days, Don Sherwood APRN, 1,000 mcg at 04/18/17 1043  •  cyanocobalamin injection 1,000 mcg, 1,000 mcg, Intramuscular, Q28 Days, Don Sherwood APRN, 1,000 mcg at 06/02/17 1119  •  cyanocobalamin injection 1,000 mcg, 1,000 mcg, Intramuscular, Q28 Days, Adarsh Pratt MD, 1,000 mcg at 03/20/18 1043  •  cyanocobalamin injection 1,000 mcg, 1,000 mcg, Intramuscular, Q28 Days, Adarsh Pratt MD, 1,000 mcg at 04/09/18 1104  •  cyanocobalamin injection 1,000 mcg, 1,000 mcg, Intramuscular, Q28 Days, Adarsh Pratt MD, 1,000 mcg at 05/01/18 1133      Vitals:    10/15/18 1006   BP: 115/80   Pulse: 72   SpO2: 94%         Physical Exam   Constitutional: He is oriented to person, place, and time. He appears well-developed and well-nourished. No distress.   HENT:   Head: Normocephalic and atraumatic.   Mouth/Throat: Oropharynx is clear and moist. No oropharyngeal exudate.   Eyes: Pupils are equal, round, and reactive to light. Conjunctivae and EOM are normal. Right eye exhibits no discharge. Left eye exhibits discharge.   Cardiovascular:   Normal carotid upstroke and normal auscultation.  He has an irregular heart rate   Pulmonary/Chest: Effort normal and breath sounds normal. No respiratory distress. He has no wheezes.   Abdominal: Soft. He exhibits no distension. There is no tenderness.   Neurological: He is oriented to person, place, and time. He has a normal Finger-Nose-Finger Test, a normal Heel to Shin Test, a normal Romberg Test and a normal Tandem Gait Test.   Skin: He  is not diaphoretic.   Psychiatric: He has a normal mood and affect. His behavior is normal. Judgment and thought content normal. His speech is slurred.         Neurologic Exam     Mental Status   Oriented to person, place, and time.   Registration: recalls 3 of 3 objects. Recall at 5 minutes: recalls 3 of 3 objects. Follows 3 step commands.   Attention: normal. Concentration: normal.   Speech: slurred (Sluured and microphonic)  Level of consciousness: alert  Knowledge: consistent with education. Able to perform simple calculations.   Able to name object. Able to read. Able to repeat. Able to write. Normal comprehension.     Cranial Nerves     CN II   Visual fields full to confrontation.     CN III, IV, VI   Pupils are equal, round, and reactive to light.  Extraocular motions are normal.   Right pupil: Size: 4 mm. Shape: regular. Reactivity: brisk. Consensual response: intact. Accommodation: intact.   Left pupil: Size: 4 mm. Shape: regular. Reactivity: brisk. Consensual response: intact. Accommodation: intact.   CN III: no CN III palsy  CN VI: no CN VI palsy  Nystagmus: none   Diplopia: none  Ophthalmoparesis: none  Upgaze: normal  Downgaze: normal  Conjugate gaze: present    CN V   Facial sensation intact.     CN VII   Facial expression full, symmetric.     CN VIII   CN VIII normal.     CN IX, X   CN IX normal.     CN XI   CN XI normal.     CN XII   CN XII normal.     Motor Exam   Muscle bulk: normal  Overall muscle tone: normal  Right arm pronator drift: absent  Left arm pronator drift: absent    Sensory Exam   Light touch normal.   Vibration normal.   Proprioception normal.   Pinprick normal.   Graphesthesia: normal  Stereognosis: normal    Gait, Coordination, and Reflexes     Gait  Gait: (Gait is slow, stool, diminished arm swing without tendency for falling)    Coordination   Romberg: negative  Finger to nose coordination: normal  Heel to shin coordination: normal  Tandem walking coordination: normal    Tremor    Resting tremor: present  Action tremor: absent    Reflexes   Right plantar: normal  Left plantar: normal  Right Key: absent  Left Key: absent  Right ankle clonus: absent  Left ankle clonus: absent  Right pendular knee jerk: absent  Left pendular knee jerk: absent          Office Visit on 09/25/2018   Component Date Value Ref Range Status   • TSH 09/25/2018 2.34  0.27 - 4.2 mIU/mL Final   • RPR 09/25/2018 Comment  Non-Reactive Final    Non-Reactive   • Vitamin B-12 09/25/2018 350  211 - 946 pg/mL Final   • Folate 09/25/2018 9.34  4.78 - 24.20 ng/mL Final   • Hemoglobin A1C 09/25/2018 5.70* 4.80 - 5.60 % Final    Comment: Hemoglobin A1C Ranges:  Increased Risk for Diabetes  5.7% to 6.4%  Diabetes                     >= 6.5%  Diabetic Goal                < 7.0%     • WBC 09/25/2018 6.38  4.50 - 10.70 10*3/mm3 Final   • RBC 09/25/2018 4.93  4.60 - 6.00 10*6/mm3 Final   • Hemoglobin 09/25/2018 14.9  13.7 - 17.6 g/dL Final   • Hematocrit 09/25/2018 46.7  40.4 - 52.2 % Final   • MCV 09/25/2018 94.7  79.8 - 96.2 fL Final   • MCH 09/25/2018 30.2  27.0 - 32.7 pg Final   • MCHC 09/25/2018 31.9* 32.6 - 36.4 g/dL Final   • RDW 09/25/2018 13.4  11.5 - 14.5 % Final   • Platelets 09/25/2018 149  140 - 500 10*3/mm3 Final   • Neutrophil Rel % 09/25/2018 53.4  42.7 - 76.0 % Final   • Lymphocyte Rel % 09/25/2018 30.6  19.6 - 45.3 % Final   • Monocyte Rel % 09/25/2018 11.6  5.0 - 12.0 % Final   • Eosinophil Rel % 09/25/2018 4.1  0.3 - 6.2 % Final   • Basophil Rel % 09/25/2018 0.3  0.0 - 1.5 % Final   • Neutrophils Absolute 09/25/2018 3.41  1.90 - 8.10 10*3/mm3 Final   • Lymphocytes Absolute 09/25/2018 1.95  0.90 - 4.80 10*3/mm3 Final   • Monocytes Absolute 09/25/2018 0.74  0.20 - 1.20 10*3/mm3 Final   • Eosinophils Absolute 09/25/2018 0.26  0.00 - 0.70 10*3/mm3 Final   • Basophils Absolute 09/25/2018 0.02  0.00 - 0.20 10*3/mm3 Final   • Immature Granulocyte Rel % 09/25/2018 0.0  0.0 - 0.5 % Final   • Immature Grans Absolute  09/25/2018 0.00  0.00 - 0.03 10*3/mm3 Final   • Glucose 09/25/2018 80  65 - 99 mg/dL Final   • BUN 09/25/2018 18  8 - 23 mg/dL Final   • Creatinine 09/25/2018 1.21  0.76 - 1.27 mg/dL Final   • eGFR Non  Am 09/25/2018 59* >60 mL/min/1.73 Final    Comment: The MDRD GFR formula is only valid for adults with stable  renal function between ages 18 and 70.     • eGFR  Am 09/25/2018 71  >60 mL/min/1.73 Final   • BUN/Creatinine Ratio 09/25/2018 14.9  7.0 - 25.0 Final   • Sodium 09/25/2018 143  136 - 145 mmol/L Final   • Potassium 09/25/2018 4.7  3.5 - 5.2 mmol/L Final   • Chloride 09/25/2018 101  98 - 107 mmol/L Final   • Total CO2 09/25/2018 29.6* 22.0 - 29.0 mmol/L Final   • Calcium 09/25/2018 9.5  8.6 - 10.5 mg/dL Final   • Total Protein 09/25/2018 7.2  6.0 - 8.5 g/dL Final   • Albumin 09/25/2018 4.40  3.50 - 5.20 g/dL Final   • Globulin 09/25/2018 2.8  gm/dL Final   • A/G Ratio 09/25/2018 1.6  g/dL Final   • Total Bilirubin 09/25/2018 0.8  0.1 - 1.2 mg/dL Final   • Alkaline Phosphatase 09/25/2018 65  39 - 117 U/L Final   • AST (SGOT) 09/25/2018 17  1 - 40 U/L Final   • ALT (SGPT) 09/25/2018 20  1 - 41 U/L Final   • 25 Hydroxy, Vitamin D 09/25/2018 30.4  30.0 - 100.0 ng/ml Final    Comment: Reference Range for Total Vitamin D 25(OH)  Deficiency    <20.0 ng/mL  Insufficiency 21-29 ng/mL  Sufficiency    ng/mL  Toxicity      >100 ng/ml        Admission on 09/06/2018, Discharged on 09/06/2018   Component Date Value Ref Range Status   • Color 09/06/2018 Yellow  Yellow, Straw, Dark Yellow, Karly Final   • Clarity, UA 09/06/2018 Clear  Clear Final   • Glucose, UA 09/06/2018 Negative  Negative, 1000 mg/dL (3+) mg/dL Final   • Bilirubin 09/06/2018 Negative  Negative Final   • Ketones, UA 09/06/2018 Negative  Negative Final   • Specific Gravity  09/06/2018 1.005  1.005 - 1.030 Final   • Blood, UA 09/06/2018 Trace* Negative Final   • pH, Urine 09/06/2018 6.0  5.0 - 8.0 Final   • Protein, POC 09/06/2018 Negative   Negative mg/dL Final   • Urobilinogen, UA 09/06/2018 Normal  Normal Final   • Nitrite, UA 09/06/2018 Negative  Negative Final   • Leukocytes 09/06/2018 Negative  Negative Final            REVIEW OF STUDIES:  ````````````  Outside the brain MRI scan from 10/2/2018 is normal for age showing small vessel ischemic changes  DIAGNOSIS, IMPRESSION AND PLAN:    Features of Parkinson's disease, a yearly onset without any current significant cognitive issues  I have reviewed the findings with the patient.  I reviewed with them the differential diagnosis.  As the present time he has no findings to suggest alternative diagnoses.  I have recommended initiating him on Sinemet 25 100.  I reviewed the side effects with him including nausea and the possibility of visual hallucinations.  I sent a prescription to his pharmacy initiating him on 3 times a day scheduled.  I have also wrote him a prescription for physical therapy and gave him addresses for all available therapies in his neighborhood.  I have strongly advised him not to drive.  I'll see him in follow-up in 4 weeks sooner should the need arise

## 2018-10-17 ENCOUNTER — OFFICE VISIT (OUTPATIENT)
Dept: ORTHOPEDIC SURGERY | Facility: CLINIC | Age: 73
End: 2018-10-17

## 2018-10-17 VITALS — WEIGHT: 204 LBS | HEIGHT: 72 IN | BODY MASS INDEX: 27.63 KG/M2 | TEMPERATURE: 98 F

## 2018-10-17 DIAGNOSIS — M25.511 CHRONIC RIGHT SHOULDER PAIN: Primary | ICD-10-CM

## 2018-10-17 DIAGNOSIS — G89.29 CHRONIC RIGHT SHOULDER PAIN: Primary | ICD-10-CM

## 2018-10-17 DIAGNOSIS — M75.01 ADHESIVE CAPSULITIS OF RIGHT SHOULDER: ICD-10-CM

## 2018-10-17 PROCEDURE — 73030 X-RAY EXAM OF SHOULDER: CPT | Performed by: ORTHOPAEDIC SURGERY

## 2018-10-17 PROCEDURE — 99204 OFFICE O/P NEW MOD 45 MIN: CPT | Performed by: ORTHOPAEDIC SURGERY

## 2018-10-17 PROCEDURE — 20610 DRAIN/INJ JOINT/BURSA W/O US: CPT | Performed by: ORTHOPAEDIC SURGERY

## 2018-10-17 RX ADMIN — LIDOCAINE HYDROCHLORIDE 2 ML: 10 INJECTION, SOLUTION EPIDURAL; INFILTRATION; INTRACAUDAL; PERINEURAL at 11:55

## 2018-10-17 RX ADMIN — METHYLPREDNISOLONE ACETATE 80 MG: 80 INJECTION, SUSPENSION INTRA-ARTICULAR; INTRALESIONAL; INTRAMUSCULAR; SOFT TISSUE at 11:55

## 2018-10-17 NOTE — PROGRESS NOTES
Patient: Darshan Bhatt    YOB: 1945    Medical Record Number: [id number]    Chief Complaints:   Right shoulder pain, loss of motion    History of Present Illness:     72 y.o. male patient who presents for evaluation of his right shoulder.  He tells me that he injured the shoulder approximately a year ago while carrying some concrete blocks.  He has experienced intermittent discomfort in the shoulder since that time.  He describes his pain as moderate, constant and aching.  The pain is intermittently sharp with certain movements.  He localizes the pain primarily to the anterior and lateral aspects of the shoulder.  He has noticed clicking and popping.  He also mentions having noticed some motion loss.  He has tried some limited physical therapy with some improvement.  Denies any other associated complaints or issues.    Allergies:   Allergies   Allergen Reactions   • Meloxicam        Home Medications:    Current Outpatient Prescriptions:   •  aspirin 81 MG tablet, Take 81 mg by mouth., Disp: , Rfl:   •  atorvastatin (LIPITOR) 40 MG tablet, 1 tab po QPM for lipids and risk reduction, Disp: 90 tablet, Rfl: 1  •  buPROPion XL (WELLBUTRIN XL) 150 MG 24 hr tablet, Take 1 tablet by mouth Daily., Disp: 90 tablet, Rfl: 1  •  cetirizine (zyrTEC) 10 MG tablet, Take 10 mg by mouth Daily., Disp: , Rfl:   •  coenzyme Q10 100 MG capsule, Take 300 mg by mouth Daily., Disp: , Rfl:   •  fluticasone (FLONASE) 50 MCG/ACT nasal spray, 2 sprays into the nostril(s) as directed by provider 2 (Two) Times a Day., Disp: , Rfl:   •  levothyroxine (SYNTHROID, LEVOTHROID) 125 MCG tablet, 1 tab po QDay for thyroid, Disp: 90 tablet, Rfl: 1  •  nitroglycerin (NITROSTAT) 0.3 MG SL tablet, Place  under the tongue., Disp: , Rfl:   •  rivaroxaban (XARELTO) 20 MG tablet, Take 1 tablet by mouth Daily With Dinner., Disp: 90 tablet, Rfl: 1  •  sotalol (BETAPACE) 80 MG tablet, 1 tab po BID, Disp: 180 tablet, Rfl: 1  •  carbidopa-levodopa  (SINEMET)  MG per tablet, Take 1 tablet by mouth 3 (Three) Times a Day., Disp: 90 tablet, Rfl: 2  •  Mirabegron ER (MYRBETRIQ) 50 MG tablet sustained-release 24 hour 24 hr tablet, 1 tab po QDay for urinary symptoms, Disp: 90 tablet, Rfl: 1    Current Facility-Administered Medications:   •  cyanocobalamin injection 1,000 mcg, 1,000 mcg, Intramuscular, Q28 Days, Don Sherwood APRN, 1,000 mcg at 12/06/16 1422  •  cyanocobalamin injection 1,000 mcg, 1,000 mcg, Intramuscular, Q28 Days, Don Sherwood APRN, 1,000 mcg at 04/18/17 1043  •  cyanocobalamin injection 1,000 mcg, 1,000 mcg, Intramuscular, Q28 Days, Don Sherwood APRN, 1,000 mcg at 06/02/17 1119  •  cyanocobalamin injection 1,000 mcg, 1,000 mcg, Intramuscular, Q28 Days, Adarsh Pratt MD, 1,000 mcg at 03/20/18 1043  •  cyanocobalamin injection 1,000 mcg, 1,000 mcg, Intramuscular, Q28 Days, Adarsh Pratt MD, 1,000 mcg at 04/09/18 1104  •  cyanocobalamin injection 1,000 mcg, 1,000 mcg, Intramuscular, Q28 Days, Adarsh Pratt MD, 1,000 mcg at 05/01/18 1133    Past Medical History:   Diagnosis Date   • A-fib (CMS/HCC)    • Allergic    • BPH (benign prostatic hyperplasia)    • CAD (coronary artery disease)    • Cancer (CMS/HCC)     skin   • Erectile dysfunction    • Hyperlipidemia    • Hypogonadism in male    • Hypothyroidism    • Movement disorder    • Sleep apnea    • Thyroid disease        Past Surgical History:   Procedure Laterality Date   • CARDIAC CATHETERIZATION     • COLONOSCOPY     • INGUINAL HERNIA REPAIR     • PROSTATE SURGERY     • SKIN CANCER DESTRUCTION     • THYROIDECTOMY, PARTIAL         Social History     Occupational History   • Not on file.     Social History Main Topics   • Smoking status: Former Smoker   • Smokeless tobacco: Former User   • Alcohol use Yes   • Drug use: Unknown   • Sexual activity: Defer      Social History     Social History Narrative   • No narrative on file       Family History   Problem Relation Age of Onset   •  "Heart disease Mother    • Diabetes Father    • Diabetes Paternal Grandmother        Review of Systems:      Constitutional: Denies fever, shaking or chills   Eyes: Denies change in visual acuity   HEENT: Denies nasal congestion or sore throat   Respiratory: Denies cough or shortness of breath   Cardiovascular: Denies chest pain or edema  Endocrine: Denies tremors, palpitations, intolerance of heat or cold, polyuria, polydipsia.  GI: Denies abdominal pain, nausea, vomiting, bloody stools or diarrhea  : Denies frequency, urgency, incontinence, retention, or nocturia.  Musculoskeletal: Denies numbness, tingling or loss of motor function except as above  Integument: Denies rash, lesion or ulceration   Neurologic: Denies headache or focal weakness, deficits  Heme: Denies spontaneous or excessive bleeding, epistaxis, hematuria, melena, fatigue, enlarged or tender lymph nodes.      All other pertinent positives and negatives as noted above in HPI.    Physical Exam: 72 y.o. male  Vitals:    10/17/18 1014   Temp: 98 °F (36.7 °C)   TempSrc: Temporal Artery    Weight: 92.5 kg (204 lb)   Height: 182.9 cm (72\")     General:  Patient is awake and alert.  Appears in no acute distress or discomfort.    Psych:  Affect and demeanor are appropriate.    Eyes:  Conjunctiva and sclera appear grossly normal.  Eyes track well and EOM seem to be intact.    Ears:  No gross abnormalities.  Hearing adequate for the exam.    Cardiovascular:  Regular rate and rhythm.    Lungs:  Good chest expansion.  Breathing unlabored.    Lymph:  No palpable adenopathy about neck or axilla.    Neck:  Supple.  Normal ROM.  Negative Spurling's for shoulder or arm pain.    Right upper extremity:  Skin benign and intact without evidence for swelling, masses or atrophy.  No palpable masses.  Tender over rotator interval.  Limited ROM:  Forward elevation 160°, external rotation 40°, horizontal abduction is lacking roughly 5°, internal rotation to T12.  On the " contralateral side he has 165° of forward elevation, external rotation of about 50°, full horizontal abduction and internal rotation to roughly T10. No evident instability or apprehension.  Positive Neer and Woodard impingement maneuvers.  Negative Speeds, Yergason's and active compression maneuvers.  Discomfort with elevation in the scapular plane but well preserved strength with resistive testing of rotator cuff.  Good strength in wrist and hand.  Intact sensation in arm, hand.  Palpable radial pulse with brisk cap refill.         Radiology:   AP, scapular Y, and axillary views of the right shoulder are ordered by myself and reviewed to evaluate the patient's complaint.  No comparison films are immediately available.  The x-rays show advanced acromioclavicular arthritis.  There are no obvious acute abnormalities, lesions, masses, significant glenohumeral degenerative changes, or other concerning findings.  The acromiohumeral interval is normal.  Glenoid version appears normal as well.    Assessment/Plan:   Right rotator cuff tendinopathy versus tear with mild associated adhesive capsulitis    His motion loss is not profound but there is certainly a difference between the 2 sides.  We discussed options in detail including conservative versus surgical options. I have recommended that we start with a conservative approach. With regards to conservative options, we discussed appropriate activity modifications, icing as needed, anti-inflammatories, physical therapy, and injections.  I demonstrated some home exercises for the patient.  I have entered a referral for him to go back to physical therapy.  I also suggested an injection.  The risks, benefits and alternatives were discussed.  He consented.  Going forward, he will follow-up as needed.  If his symptoms persist or recur, I told him I'll be happy to see him back at any point.    Antwon Ibarra MD    10/17/2018    CC to Edouard Sharpe MD    Large Joint  Arthrocentesis  Date/Time: 10/17/2018 11:55 AM  Consent given by: patient  Site marked: site marked  Timeout: Immediately prior to procedure a time out was called to verify the correct patient, procedure, equipment, support staff and site/side marked as required   Supporting Documentation  Indications: pain   Procedure Details  Location: shoulder - R subacromial bursa  Preparation: Patient was prepped and draped in the usual sterile fashion  Needle gauge: 21 G.  Approach: posterior  Medications administered: 80 mg methylPREDNISolone acetate 80 MG/ML; 2 mL lidocaine PF 1% 1 %  Patient tolerance: patient tolerated the procedure well with no immediate complications

## 2018-10-18 RX ORDER — METHYLPREDNISOLONE ACETATE 80 MG/ML
80 INJECTION, SUSPENSION INTRA-ARTICULAR; INTRALESIONAL; INTRAMUSCULAR; SOFT TISSUE
Status: COMPLETED | OUTPATIENT
Start: 2018-10-17 | End: 2018-10-17

## 2018-10-18 RX ORDER — LIDOCAINE HYDROCHLORIDE 10 MG/ML
2 INJECTION, SOLUTION EPIDURAL; INFILTRATION; INTRACAUDAL; PERINEURAL
Status: COMPLETED | OUTPATIENT
Start: 2018-10-17 | End: 2018-10-17

## 2018-10-24 DIAGNOSIS — G20 PARKINSON'S DISEASE (HCC): Primary | ICD-10-CM

## 2018-11-07 RX ORDER — ATORVASTATIN CALCIUM 40 MG/1
TABLET, FILM COATED ORAL
Qty: 90 TABLET | Refills: 0 | Status: SHIPPED | OUTPATIENT
Start: 2018-11-07 | End: 2019-05-13 | Stop reason: SDUPTHER

## 2018-12-17 NOTE — TELEPHONE ENCOUNTER
Patient cancelled last appointment and did not reschedule   Allow only one weke of refill and he ahs to come and be seen before runs out of meds

## 2019-01-02 ENCOUNTER — TELEPHONE (OUTPATIENT)
Dept: FAMILY MEDICINE CLINIC | Facility: CLINIC | Age: 74
End: 2019-01-02

## 2019-01-02 NOTE — TELEPHONE ENCOUNTER
Pharm needs clairfication needed for xeralto 90 ct the pharmacy is [facility] pharmacy  [birth year]

## 2019-02-20 ENCOUNTER — OFFICE VISIT (OUTPATIENT)
Dept: INTERNAL MEDICINE | Facility: CLINIC | Age: 74
End: 2019-02-20

## 2019-02-20 VITALS
WEIGHT: 205 LBS | HEART RATE: 50 BPM | OXYGEN SATURATION: 98 % | HEIGHT: 72 IN | DIASTOLIC BLOOD PRESSURE: 60 MMHG | SYSTOLIC BLOOD PRESSURE: 122 MMHG | BODY MASS INDEX: 27.77 KG/M2 | TEMPERATURE: 98 F

## 2019-02-20 DIAGNOSIS — G47.33 OBSTRUCTIVE SLEEP APNEA SYNDROME: ICD-10-CM

## 2019-02-20 DIAGNOSIS — F41.9 ANXIETY AND DEPRESSION: ICD-10-CM

## 2019-02-20 DIAGNOSIS — E78.01 FAMILIAL HYPERCHOLESTEROLEMIA: Primary | ICD-10-CM

## 2019-02-20 DIAGNOSIS — E03.9 ACQUIRED HYPOTHYROIDISM: ICD-10-CM

## 2019-02-20 DIAGNOSIS — R73.01 IFG (IMPAIRED FASTING GLUCOSE): ICD-10-CM

## 2019-02-20 DIAGNOSIS — F32.A ANXIETY AND DEPRESSION: ICD-10-CM

## 2019-02-20 PROCEDURE — 99214 OFFICE O/P EST MOD 30 MIN: CPT | Performed by: FAMILY MEDICINE

## 2019-02-20 PROCEDURE — 90670 PCV13 VACCINE IM: CPT | Performed by: FAMILY MEDICINE

## 2019-02-20 PROCEDURE — G0009 ADMIN PNEUMOCOCCAL VACCINE: HCPCS | Performed by: FAMILY MEDICINE

## 2019-02-20 NOTE — PROGRESS NOTES
Subjective   Darshan Bhatt is a 73 y.o. male.   Chief Complaint   Patient presents with   • Hyperlipidemia   • Hypothyroidism   • Depression   • Allergic Rhinitis   • Hyperglycemia       History of Present Illness     This is a new patient in our office.    1.  Hyperlipidemia-patient is on atorvastatin 40 mg a day.  He takes every day.  He has no muscle aches, muscle cramps.  He has a history of coronary artery disease.  He had stents placed in 2007.  He has A. fib and he follow-up with cardiologist dr García.  He is on sotalol and Xarelto.  He does not smoke cigarettes.  He is a former smoker.  He quit at age of 33.  He exercises regularly.  He goes for daily walks for an hour.  Family history is positive for stroke in his mother.    2.  Hypothyroidism-patient is post partial thyroidectomy was done years ago.  It was secondary to noncancerous condition.  Not otherwise specified.  He is on levothyroxine 125 mcg a day.  He takes it every day on empty stomach and does not eat for at least an hour.  TSH in September 2018 was at 2.3.  3.  Depression with anxiety-for years.  Patient is on Wellbutrin  mg a day.  It helps.  He has good mood most of the time.  He lives alone.  No suicidal ideation, no aggressive behaviors.    4.  Impaired fasting glucose-A1c at 5.7.  BMI 87.8.    5.  Allergic rhinitis-patient is on Flonase and Zyrtec.  He has to take it every day all year long.  Occasionally he has to take extra Benadryl.  He has no side effects.  No nosebleeds.    Parkinson disease- followed up by neurologist Dr. Navarro.    Former history please see health history form which was reviewed by me and will be scanned.    The following portions of the patient's history were reviewed and updated as appropriate: allergies, current medications, past family history, past medical history, past social history, past surgical history and problem list.    Review of Systems   Constitutional: Negative.    Respiratory: Negative.     Cardiovascular: Negative.    Psychiatric/Behavioral: Negative for suicidal ideas.         Objective   Wt Readings from Last 3 Encounters:   02/20/19 93 kg (205 lb)   11/21/18 90.7 kg (200 lb)   10/30/18 90.9 kg (200 lb 8 oz)      Vitals:    02/20/19 1330   BP: 122/60   Pulse: 50   Temp: 98 °F (36.7 °C)   SpO2: 98%     Temp Readings from Last 3 Encounters:   02/20/19 98 °F (36.7 °C)   11/21/18 97.5 °F (36.4 °C) (Temporal)   10/30/18 97.4 °F (36.3 °C) (Oral)     BP Readings from Last 3 Encounters:   02/20/19 122/60   11/21/18 132/71   10/30/18 116/71     Pulse Readings from Last 3 Encounters:   02/20/19 50   11/21/18 77   10/30/18 56       Physical Exam   Constitutional: He is oriented to person, place, and time. He appears well-developed and well-nourished.   HENT:   Head: Normocephalic and atraumatic.   Neck: Neck supple. Carotid bruit is not present. No thyromegaly present.   Cardiovascular: Normal rate, regular rhythm and normal heart sounds.   Pulmonary/Chest: Effort normal and breath sounds normal.   Neurological: He is alert and oriented to person, place, and time.   Skin: Skin is warm, dry and intact.   Psychiatric: He has a normal mood and affect. His behavior is normal. His speech is delayed.       Assessment/Plan   Darshan was seen today for hyperlipidemia, hypothyroidism, depression, allergic rhinitis and hyperglycemia.    Diagnoses and all orders for this visit:    Familial hypercholesterolemia  -     Hemoglobin A1c  -     Basic Metabolic Panel  -     Lipid Panel With LDL / HDL Ratio    Obstructive sleep apnea syndrome  -     Ambulatory Referral to Sleep Medicine    Acquired hypothyroidism    Anxiety and depression    IFG (impaired fasting glucose)  -     Ambulatory Referral to Nutrition Services  -     Hemoglobin A1c  -     Basic Metabolic Panel  -     Lipid Panel With LDL / HDL Ratio        #1 hyperlipidemia-check labs.  Continue current treatment.  Follow-up in 6 months.    2.  Hypothyroidism-continue  current treatment.  Follow-up in 6 months.    3.  Depression anxiety-continue same.  Follow-up in 6 months, or sooner if problems.    4.  Impaired fasting glucose-weight loss can decrease risk of developing diabetes.  We are checking A1c. Rf to nutritionist.    6.  Allergic rhinitis-continue Flonase and Zyrtec.  Follow-up in 12 months.    7.  Parkinson disease-managed by neurologist Dr. Navarro.    8.  Coronary artery disease/A. fib-by cardiologist .    Patient is getting Prevnar 13 today.  He will need to Pneumovax in 1 year.

## 2019-02-22 LAB
BUN SERPL-MCNC: 15 MG/DL (ref 8–27)
BUN/CREAT SERPL: 15 (ref 10–24)
CALCIUM SERPL-MCNC: 8.9 MG/DL (ref 8.6–10.2)
CHLORIDE SERPL-SCNC: 102 MMOL/L (ref 96–106)
CHOLEST SERPL-MCNC: 115 MG/DL (ref 100–199)
CO2 SERPL-SCNC: 26 MMOL/L (ref 20–29)
CREAT SERPL-MCNC: 1.03 MG/DL (ref 0.76–1.27)
GLUCOSE SERPL-MCNC: 94 MG/DL (ref 65–99)
HBA1C MFR BLD: 5.7 % (ref 4.8–5.6)
HDLC SERPL-MCNC: 51 MG/DL
LDLC SERPL CALC-MCNC: 55 MG/DL (ref 0–99)
LDLC/HDLC SERPL: 1.1 RATIO (ref 0–3.6)
POTASSIUM SERPL-SCNC: 4.4 MMOL/L (ref 3.5–5.2)
SODIUM SERPL-SCNC: 141 MMOL/L (ref 134–144)
TRIGL SERPL-MCNC: 45 MG/DL (ref 0–149)
VLDLC SERPL CALC-MCNC: 9 MG/DL (ref 5–40)

## 2019-03-06 ENCOUNTER — APPOINTMENT (OUTPATIENT)
Dept: SLEEP MEDICINE | Facility: HOSPITAL | Age: 74
End: 2019-03-06

## 2019-03-25 RX ORDER — BUPROPION HYDROCHLORIDE 150 MG/1
150 TABLET ORAL DAILY
Qty: 90 TABLET | Refills: 1 | Status: SHIPPED | OUTPATIENT
Start: 2019-03-25 | End: 2019-08-05 | Stop reason: SDUPTHER

## 2019-04-19 ENCOUNTER — TELEPHONE (OUTPATIENT)
Dept: INTERNAL MEDICINE | Facility: CLINIC | Age: 74
End: 2019-04-19

## 2019-04-19 RX ORDER — LEVOTHYROXINE SODIUM 0.12 MG/1
TABLET ORAL
Qty: 90 TABLET | Refills: 0 | Status: SHIPPED | OUTPATIENT
Start: 2019-04-19 | End: 2019-06-24 | Stop reason: SDUPTHER

## 2019-04-19 NOTE — TELEPHONE ENCOUNTER
RX SENT TO [facility] MAIL ORDER.    ----- Message from Kinza Suh sent at 4/19/2019 10:14 AM EDT -----  He is a new Patient.   ----- Message -----  From: Laquita Bass  Sent: 4/19/2019   7:28 AM  To: Kinza Suh    It was never prescribed by dr johnson.    ----- Message -----  From: Maryellen Mensah RegSched Rep  Sent: 4/18/2019  10:29 AM  To: Shanique Courtney MA    Pt is requesting a 90 day supply     levothyroxine (SYNTHROID, LEVOTHROID) 125 MCG tablet    [employer] [phone] (Phone)

## 2019-05-13 RX ORDER — ATORVASTATIN CALCIUM 40 MG/1
40 TABLET, FILM COATED ORAL DAILY
Qty: 90 TABLET | Refills: 0 | Status: SHIPPED | OUTPATIENT
Start: 2019-05-13 | End: 2019-07-15 | Stop reason: SDUPTHER

## 2019-06-10 RX ORDER — RIVAROXABAN 20 MG/1
TABLET, FILM COATED ORAL
Qty: 90 TABLET | Refills: 1 | Status: SHIPPED | OUTPATIENT
Start: 2019-06-10 | End: 2020-05-04 | Stop reason: HOSPADM

## 2019-06-18 DIAGNOSIS — G47.33 OBSTRUCTIVE SLEEP APNEA SYNDROME: Primary | ICD-10-CM

## 2019-06-24 RX ORDER — LEVOTHYROXINE SODIUM 0.12 MG/1
TABLET ORAL
Qty: 90 TABLET | Refills: 0 | Status: SHIPPED | OUTPATIENT
Start: 2019-06-24 | End: 2019-08-26 | Stop reason: SDUPTHER

## 2019-06-28 ENCOUNTER — OFFICE VISIT (OUTPATIENT)
Dept: INTERNAL MEDICINE | Facility: CLINIC | Age: 74
End: 2019-06-28

## 2019-06-28 VITALS
BODY MASS INDEX: 27.09 KG/M2 | HEART RATE: 42 BPM | SYSTOLIC BLOOD PRESSURE: 90 MMHG | OXYGEN SATURATION: 98 % | WEIGHT: 200 LBS | TEMPERATURE: 98 F | HEIGHT: 72 IN | DIASTOLIC BLOOD PRESSURE: 52 MMHG

## 2019-06-28 DIAGNOSIS — S61.411D SKIN TEAR OF RIGHT HAND WITHOUT COMPLICATION, SUBSEQUENT ENCOUNTER: ICD-10-CM

## 2019-06-28 DIAGNOSIS — I95.1 ORTHOSTATIC HYPOTENSION: Primary | ICD-10-CM

## 2019-06-28 DIAGNOSIS — R00.1 BRADYCARDIA: ICD-10-CM

## 2019-06-28 DIAGNOSIS — R42 DIZZINESS: ICD-10-CM

## 2019-06-28 PROBLEM — G20.A1 PARKINSON DISEASE: Status: ACTIVE | Noted: 2019-05-24

## 2019-06-28 PROBLEM — G20 PARKINSON DISEASE (HCC): Status: ACTIVE | Noted: 2019-05-24

## 2019-06-28 PROCEDURE — 99214 OFFICE O/P EST MOD 30 MIN: CPT | Performed by: FAMILY MEDICINE

## 2019-06-28 NOTE — PROGRESS NOTES
Subjective   Darshan Bhatt is a 73 y.o. male.   Chief Complaint   Patient presents with   • Hypotension     hospital follow op   • Hand Injury   • Dizziness       History of Present Illness     #1  Dizziness/#2 orthostatic hypotension-patient was admitted to hospital from 5/14/2019 to 5/17/2019.  He was admitted for dizziness.  He was found to have orthostatic hypotension.  He was treated with IV hydration and improved.  Patient has Parkinson disease, and it was thought that his symptoms can be secondary to it.  Also sotalol could play a role.  There was no change in medications made.  Chest x-ray and echo were essentially normal.  CBC and BMP are normal.  Patient did follow-up with his cardiologist  on 5/24/2019.  His heart rate there was at 46, blood pressure was at 120/74.  No changes were made in medications as patient was asymptomatic.   He reports that at home his blood pressure stays in 120s over 70s to 80s and his heart rate is in 40s.  He says that he feels back to baseline.    #3  Laceration-patient was evaluated in ER on 6/26 for right hand laceration- he fell off bed.  He had a dream.  He hit his right hand on metal lamp.  No head injury.  No stitches were necessary.  Bacitracin was applied and Steri-Strips.  Patient reports no pain in his hand, no redness, no swelling.  Tetanus vaccine was in September 2018.    The following portions of the patient's history were reviewed and updated as appropriate: allergies, current medications, past family history, past medical history, past social history, past surgical history and problem list.    Review of Systems   Respiratory: Negative for shortness of breath.    Cardiovascular: Negative for chest pain and palpitations.   Neurological: Positive for light-headedness.         Objective   Wt Readings from Last 3 Encounters:   06/28/19 90.7 kg (200 lb)   05/10/19 90.7 kg (200 lb)   02/20/19 93 kg (205 lb)      Vitals:    06/28/19 1016   BP: 90/52   Pulse: (!)  42   Temp: 98 °F (36.7 °C)   SpO2: 98%     Temp Readings from Last 3 Encounters:   06/28/19 98 °F (36.7 °C)   05/10/19 97.6 °F (36.4 °C) (Oral)   02/20/19 98 °F (36.7 °C)     BP Readings from Last 3 Encounters:   06/28/19 90/52   05/10/19 116/72   02/20/19 122/60     Pulse Readings from Last 3 Encounters:   06/28/19 (!) 42   05/10/19 52   02/20/19 50       Physical Exam   Constitutional: He is oriented to person, place, and time. He appears well-developed and well-nourished.   HENT:   Head: Normocephalic and atraumatic.   Neck: Neck supple. Carotid bruit is not present. No thyromegaly present.   Cardiovascular: Normal rate, regular rhythm and normal heart sounds.   Bradycardia.   Pulmonary/Chest: Effort normal and breath sounds normal.   Neurological: He is alert and oriented to person, place, and time.   Skin: Skin is warm, dry and intact.   Psychiatric: He has a normal mood and affect. His behavior is normal.       Assessment/Plan   Darshan was seen today for hypotension, hand injury and dizziness.    Diagnoses and all orders for this visit:    Orthostatic hypotension    Dizziness    Skin tear of right hand without complication, subsequent encounter    Bradycardia        #1 dizziness/#2 orthostatic hypotension#3 bradycardia- hospital records were reviewed.  Blood work results and imaging test results were reviewed.  Patient has a history of bradycardia, but his blood pressure is lower than it was at his cardiologist office.  I am asking him to call  today for his advice.  I am advising patient to use a cane to help with fall prevention.  He is on anticoagulation for A. fib.    #4 right hand skin laceration-no signs of infection.  Continue current management.  ER notes were reviewed.

## 2019-07-08 ENCOUNTER — APPOINTMENT (OUTPATIENT)
Dept: SLEEP MEDICINE | Facility: HOSPITAL | Age: 74
End: 2019-07-08

## 2019-07-11 ENCOUNTER — APPOINTMENT (OUTPATIENT)
Dept: SLEEP MEDICINE | Facility: HOSPITAL | Age: 74
End: 2019-07-11

## 2019-07-15 RX ORDER — ATORVASTATIN CALCIUM 40 MG/1
TABLET, FILM COATED ORAL
Qty: 90 TABLET | Refills: 0 | Status: SHIPPED | OUTPATIENT
Start: 2019-07-15 | End: 2019-11-14 | Stop reason: SDUPTHER

## 2019-07-31 DIAGNOSIS — G47.30 SLEEP APNEA, UNSPECIFIED TYPE: Primary | ICD-10-CM

## 2019-08-05 RX ORDER — BUPROPION HYDROCHLORIDE 150 MG/1
TABLET ORAL
Qty: 90 TABLET | Refills: 0 | Status: SHIPPED | OUTPATIENT
Start: 2019-08-05 | End: 2019-11-14 | Stop reason: SDUPTHER

## 2019-08-27 RX ORDER — LEVOTHYROXINE SODIUM 0.12 MG/1
TABLET ORAL
Qty: 90 TABLET | Refills: 0 | Status: SHIPPED | OUTPATIENT
Start: 2019-08-27 | End: 2019-11-14 | Stop reason: SDUPTHER

## 2019-11-13 RX ORDER — BUPROPION HYDROCHLORIDE 150 MG/1
TABLET ORAL
Qty: 90 TABLET | Refills: 0 | OUTPATIENT
Start: 2019-11-13

## 2019-11-13 RX ORDER — LEVOTHYROXINE SODIUM 0.12 MG/1
TABLET ORAL
Qty: 90 TABLET | Refills: 0 | OUTPATIENT
Start: 2019-11-13

## 2019-11-13 RX ORDER — ATORVASTATIN CALCIUM 40 MG/1
TABLET, FILM COATED ORAL
Qty: 90 TABLET | Refills: 0 | OUTPATIENT
Start: 2019-11-13

## 2019-11-14 DIAGNOSIS — R73.01 IFG (IMPAIRED FASTING GLUCOSE): ICD-10-CM

## 2019-11-14 DIAGNOSIS — E78.01 FAMILIAL HYPERCHOLESTEROLEMIA: Primary | ICD-10-CM

## 2019-11-14 DIAGNOSIS — E03.9 ACQUIRED HYPOTHYROIDISM: ICD-10-CM

## 2019-11-14 RX ORDER — BUPROPION HYDROCHLORIDE 150 MG/1
150 TABLET ORAL DAILY
Qty: 90 TABLET | Refills: 0 | Status: SHIPPED | OUTPATIENT
Start: 2019-11-14 | End: 2019-11-18 | Stop reason: SDUPTHER

## 2019-11-14 RX ORDER — LEVOTHYROXINE SODIUM 0.12 MG/1
125 TABLET ORAL DAILY
Qty: 90 TABLET | Refills: 0 | Status: SHIPPED | OUTPATIENT
Start: 2019-11-14 | End: 2019-11-18 | Stop reason: SDUPTHER

## 2019-11-14 RX ORDER — ATORVASTATIN CALCIUM 40 MG/1
40 TABLET, FILM COATED ORAL DAILY
Qty: 90 TABLET | Refills: 0 | Status: SHIPPED | OUTPATIENT
Start: 2019-11-14 | End: 2019-11-18 | Stop reason: SDUPTHER

## 2019-11-15 LAB
CHOLEST SERPL-MCNC: 116 MG/DL (ref 100–199)
HBA1C MFR BLD: 5.8 % (ref 4.8–5.6)
HDLC SERPL-MCNC: 56 MG/DL
LDLC SERPL CALC-MCNC: 46 MG/DL (ref 0–99)
LDLC/HDLC SERPL: 0.8 RATIO (ref 0–3.6)
TRIGL SERPL-MCNC: 71 MG/DL (ref 0–149)
TSH SERPL DL<=0.005 MIU/L-ACNC: 1.05 UIU/ML (ref 0.45–4.5)
TSH SERPL DL<=0.005 MIU/L-ACNC: NORMAL UIU/ML
VLDLC SERPL CALC-MCNC: 14 MG/DL (ref 5–40)

## 2019-11-18 ENCOUNTER — OFFICE VISIT (OUTPATIENT)
Dept: INTERNAL MEDICINE | Facility: CLINIC | Age: 74
End: 2019-11-18

## 2019-11-18 VITALS
WEIGHT: 204 LBS | DIASTOLIC BLOOD PRESSURE: 60 MMHG | BODY MASS INDEX: 27.63 KG/M2 | SYSTOLIC BLOOD PRESSURE: 100 MMHG | HEART RATE: 74 BPM | HEIGHT: 72 IN | OXYGEN SATURATION: 98 % | TEMPERATURE: 98.4 F

## 2019-11-18 DIAGNOSIS — Z00.00 MEDICARE ANNUAL WELLNESS VISIT, SUBSEQUENT: Primary | ICD-10-CM

## 2019-11-18 DIAGNOSIS — F32.A ANXIETY AND DEPRESSION: ICD-10-CM

## 2019-11-18 DIAGNOSIS — E03.9 ACQUIRED HYPOTHYROIDISM: ICD-10-CM

## 2019-11-18 DIAGNOSIS — F41.9 ANXIETY AND DEPRESSION: ICD-10-CM

## 2019-11-18 DIAGNOSIS — R73.01 IFG (IMPAIRED FASTING GLUCOSE): ICD-10-CM

## 2019-11-18 DIAGNOSIS — E78.01 FAMILIAL HYPERCHOLESTEROLEMIA: ICD-10-CM

## 2019-11-18 PROBLEM — K62.6 STERCORAL ULCER OF RECTUM: Status: RESOLVED | Noted: 2017-11-28 | Resolved: 2019-11-18

## 2019-11-18 PROCEDURE — 96160 PT-FOCUSED HLTH RISK ASSMT: CPT | Performed by: FAMILY MEDICINE

## 2019-11-18 PROCEDURE — G0439 PPPS, SUBSEQ VISIT: HCPCS | Performed by: FAMILY MEDICINE

## 2019-11-18 PROCEDURE — 99214 OFFICE O/P EST MOD 30 MIN: CPT | Performed by: FAMILY MEDICINE

## 2019-11-18 RX ORDER — BUPROPION HYDROCHLORIDE 150 MG/1
150 TABLET ORAL DAILY
Qty: 90 TABLET | Refills: 1 | Status: SHIPPED | OUTPATIENT
Start: 2019-11-18 | End: 2020-05-19 | Stop reason: SDUPTHER

## 2019-11-18 RX ORDER — LEVOTHYROXINE SODIUM 0.12 MG/1
125 TABLET ORAL DAILY
Qty: 90 TABLET | Refills: 3 | Status: SHIPPED | OUTPATIENT
Start: 2019-11-18 | End: 2020-11-16 | Stop reason: SDUPTHER

## 2019-11-18 RX ORDER — ATORVASTATIN CALCIUM 40 MG/1
40 TABLET, FILM COATED ORAL DAILY
Qty: 90 TABLET | Refills: 1 | Status: SHIPPED | OUTPATIENT
Start: 2019-11-18 | End: 2020-05-19 | Stop reason: SDUPTHER

## 2019-11-18 NOTE — PROGRESS NOTES
The ABCs of the Annual Wellness Visit  Subsequent Medicare Wellness Visit    Chief Complaint   Patient presents with   • Medicare Wellness-subsequent   • Hypothyroidism   • Hyperlipidemia   • Depression   • Allergies       Subjective   History of Present Illness:  Darshan Bhatt is a 74 y.o. male who presents for a Subsequent Medicare Wellness Visit.    HEALTH RISK ASSESSMENT    Recent Hospitalizations:  Recently treated at the following:  Other: [facility] 5/2019 for orthostatic hypothention.    Current Medical Providers:  Patient Care Team:  Blanca Hooper MD as PCP - General (Family Medicine)  Linwood Cox MD as Consulting Physician (Urology)  Darshan Cox MD as Consulting Physician (Dermatology)    Smoking Status:  Social History     Tobacco Use   Smoking Status Former Smoker   Smokeless Tobacco Former User       Alcohol Consumption:  Social History     Substance and Sexual Activity   Alcohol Use Yes       Depression Screen:   PHQ-2/PHQ-9 Depression Screening 11/18/2019   Little interest or pleasure in doing things 0   Feeling down, depressed, or hopeless 0   Trouble falling or staying asleep, or sleeping too much 0   Feeling tired or having little energy 0   Poor appetite or overeating 0   Feeling bad about yourself - or that you are a failure or have let yourself or your family down 0   Trouble concentrating on things, such as reading the newspaper or watching television 0   Moving or speaking so slowly that other people could have noticed. Or the opposite - being so fidgety or restless that you have been moving around a lot more than usual 0   Thoughts that you would be better off dead, or of hurting yourself in some way 0   Total Score 0   If you checked off any problems, how difficult have these problems made it for you to do your work, take care of things at home, or get along with other people? Not difficult at all       Fall Risk Screen:  STEADI Fall Risk Assessment was completed, and  patient is at MODERATE risk for falls. Assessment completed on:11/18/2019    Health Habits and Functional and Cognitive Screening:  Functional & Cognitive Status 11/18/2019   Do you have difficulty preparing food and eating? No   Do you have difficulty bathing yourself, getting dressed or grooming yourself? No   Do you have difficulty using the toilet? No   Do you have difficulty moving around from place to place? No   Do you have trouble with steps or getting out of a bed or a chair? No   Current Diet Well Balanced Diet   Dental Exam Up to date   Eye Exam Up to date   Exercise (times per week) 5 times per week   Current Exercise Activities Include Walking   Do you need help using the phone?  No   Are you deaf or do you have serious difficulty hearing?  No   Do you need help with transportation? No   Do you need help shopping? No   Do you need help preparing meals?  No   Do you need help with housework?  No   Do you need help with laundry? No   Do you need help taking your medications? No   Do you need help managing money? No   Do you ever drive or ride in a car without wearing a seat belt? No   Have you felt unusual stress, anger or loneliness in the last month? No   Who do you live with? Alone   If you need help, do you have trouble finding someone available to you? No   Have you been bothered in the last four weeks by sexual problems? No   Do you have difficulty concentrating, remembering or making decisions? Yes     Patient is treated for Parkinson's disease.    Does the patient have evidence of cognitive impairment? No    Asprin use counseling:Taking ASA appropriately as indicated    Age-appropriate Screening Schedule:  Refer to the list below for future screening recommendations based on patient's age, sex and/or medical conditions. Orders for these recommended tests are listed in the plan section. The patient has been provided with a written plan.    Health Maintenance   Topic Date Due   • INFLUENZA VACCINE   08/01/2019   • LIPID PANEL  11/14/2020   • COLONOSCOPY  11/05/2022   • TDAP/TD VACCINES (3 - Td) 09/25/2028   • PNEUMOCOCCAL VACCINES (65+ LOW/MEDIUM RISK)  Completed   • ZOSTER VACCINE  Completed          The following portions of the patient's history were reviewed and updated as appropriate: allergies, current medications, past family history, past medical history, past social history, past surgical history and problem list.    Outpatient Medications Prior to Visit   Medication Sig Dispense Refill   • carbidopa-levodopa (SINEMET)  MG per tablet      • cetirizine (zyrTEC) 10 MG tablet Take 10 mg by mouth Daily.     • fluticasone (FLONASE) 50 MCG/ACT nasal spray 2 sprays into the nostril(s) as directed by provider 2 (Two) Times a Day.     • nitroglycerin (NITROSTAT) 0.3 MG SL tablet Place  under the tongue.     • sotalol (BETAPACE) 80 MG tablet 1 tab po  tablet 1   • XARELTO 20 MG tablet TAKE 1 TABLET BY MOUTH DAILY WITH DINNER. 90 tablet 1   • buPROPion XL (WELLBUTRIN XL) 150 MG 24 hr tablet Take 1 tablet by mouth Daily. 90 tablet 0   • levothyroxine (SYNTHROID, LEVOTHROID) 125 MCG tablet Take 1 tablet by mouth Daily.   90 tablet 0   • aspirin 81 MG tablet Take 81 mg by mouth.     • atorvastatin (LIPITOR) 40 MG tablet Take 1 tablet by mouth Daily. 90 tablet 0   • cefdinir (OMNICEF) 300 MG capsule Take 1 capsule by mouth 2 (Two) Times a Day. 20 capsule 0     No facility-administered medications prior to visit.        Patient Active Problem List   Diagnosis   • PAF (paroxysmal atrial fibrillation) (CMS/HCC)   • Anxiety   • Coronary arteriosclerosis in native artery   • Benign prostatic hyperplasia   • Depression   • Dizziness   • Hemorrhoids   • Hyperlipidemia   • Hypothyroidism   • Malignant melanoma of skin (CMS/HCC)   • Sleep apnea   • Vitamin B12 deficiency   • Angiodysplasia of colon without bleeding   • Chronic anticoagulation   • IFG (impaired fasting glucose)   • Orthostatic hypotension   •  "Parkinson disease (CMS/[facility])       Advanced Care Planning:  Patient has an advance directive - a copy has not been provided. Have asked the patient to send this to us to add to record    Review of Systems    Compared to one year ago, the patient feels his physical health is better.  Compared to one year ago, the patient feels his mental health is better.    Reviewed chart for potential of high risk medication in the elderly: yes  Reviewed chart for potential of harmful drug interactions in the elderly:yes    Objective         Vitals:    11/18/19 1041   BP: 100/60   Pulse: 74   Temp: 98.4 °F (36.9 °C)   SpO2: 98%   Weight: 92.5 kg (204 lb)   Height: 182.9 cm (72.01\")   PainSc: 0-No pain       Body mass index is 27.66 kg/m².  Discussed the patient's BMI with him. The BMI is above average; BMI management plan is completed.    Physical Exam    Lab Results   Component Value Date    CHLPL 116 11/14/2019    TRIG 71 11/14/2019    HDL 56 11/14/2019    LDL 46 11/14/2019    VLDL 14 11/14/2019    HGBA1C 5.8 (H) 11/14/2019        Assessment/Plan   Medicare Risks and Personalized Health Plan  CMS Preventative Services Quick Reference  Cardiovascular risk  Fall Risk  Immunizations Discussed/Encouraged (specific immunizations; Shingrix )  Obesity/Overweight      Continue regular exercise.  At least 30 minutes a day, 5 days a week.  Information on calorie count and exercise to lose weight provided.  Patient is advised to get Shingrix at the pharmacy.  Information on fall prevention provided.  I am advising patient to start using walking poles.  Consider adding yoga or art chi for balance.    The above risks/problems have been discussed with the patient.  Pertinent information has been shared with the patient in the After Visit Summary.  Follow up plans and orders are seen below in the Assessment/Plan Section.    Diagnoses and all orders for this visit:    1. Medicare annual wellness visit, subsequent (Primary)    2. Familial " hypercholesterolemia    3. Acquired hypothyroidism    4. IFG (impaired fasting glucose)    5. Anxiety and depression    Other orders  -     levothyroxine (SYNTHROID, LEVOTHROID) 125 MCG tablet; Take 1 tablet by mouth Daily.  Dispense: 90 tablet; Refill: 3  -     buPROPion XL (WELLBUTRIN XL) 150 MG 24 hr tablet; Take 1 tablet by mouth Daily.  Dispense: 90 tablet; Refill: 1  -     atorvastatin (LIPITOR) 40 MG tablet; Take 1 tablet by mouth Daily.  Dispense: 90 tablet; Refill: 1      Follow Up:  Return in about 6 months (around 5/18/2020).     An After Visit Summary and PPPS were given to the patient.

## 2019-11-18 NOTE — PATIENT INSTRUCTIONS
Prediabetes Eating Plan  Prediabetes is a condition that causes blood sugar (glucose) levels to be higher than normal. This increases the risk for developing diabetes. In order to prevent diabetes from developing, your health care provider may recommend a diet and other lifestyle changes to help you:  · Control your blood glucose levels.  · Improve your cholesterol levels.  · Manage your blood pressure.  Your health care provider may recommend working with a diet and nutrition specialist (dietitian) to make a meal plan that is best for you.  What are tips for following this plan?  Lifestyle  · Set weight loss goals with the help of your health care team. It is recommended that most people with prediabetes lose 7% of their current body weight.  · Exercise for at least 30 minutes at least 5 days a week.  · Attend a support group or seek ongoing support from a mental health counselor.  · Take over-the-counter and prescription medicines only as told by your health care provider.  Reading food labels  · Read food labels to check the amount of fat, salt (sodium), and sugar in prepackaged foods. Avoid foods that have:  ? Saturated fats.  ? Trans fats.  ? Added sugars.  · Avoid foods that have more than 300 milligrams (mg) of sodium per serving. Limit your daily sodium intake to less than 2,300 mg each day.  Shopping  · Avoid buying pre-made and processed foods.  Cooking  · Cook with olive oil. Do not use butter, lard, or ghee.  · Bake, broil, grill, or boil foods. Avoid frying.  Meal planning    · Work with your dietitian to develop an eating plan that is right for you. This may include:  ? Tracking how many calories you take in. Use a food diary, notebook, or mobile application to track what you eat at each meal.  ? Using the glycemic index (GI) to plan your meals. The index tells you how quickly a food will raise your blood glucose. Choose low-GI foods. These foods take a longer time to raise blood glucose.  · Consider  following a Mediterranean diet. This diet includes:  ? Several servings each day of fresh fruits and vegetables.  ? Eating fish at least twice a week.  ? Several servings each day of whole grains, beans, nuts, and seeds.  ? Using olive oil instead of other fats.  ? Moderate alcohol consumption.  ? Eating small amounts of red meat and whole-fat dairy.  · If you have high blood pressure, you may need to limit your sodium intake or follow a diet such as the DASH eating plan. DASH is an eating plan that aims to lower high blood pressure.  What foods are recommended?  The items listed below may not be a complete list. Talk with your dietitian about what dietary choices are best for you.  Grains  Whole grains, such as whole-wheat or whole-grain breads, crackers, cereals, and pasta. Unsweetened oatmeal. Bulgur. Barley. Quinoa. Brown rice. Corn or whole-wheat flour tortillas or taco shells.  Vegetables  Lettuce. Spinach. Peas. Beets. Cauliflower. Cabbage. Broccoli. Carrots. Tomatoes. Squash. Eggplant. Herbs. Peppers. Onions. Cucumbers. [place] sprouts.  Fruits  Berries. Bananas. Apples. Oranges. Grapes. Papaya. Elysian. Pomegranate. Kiwi. Grapefruit. Cherries.  Meats and other protein foods  Seafood. Poultry without skin. Lean cuts of pork and beef. Tofu. Eggs. Nuts. Beans.  Dairy  Low-fat or fat-free dairy products, such as yogurt, cottage cheese, and cheese.  Beverages  Water. Tea. Coffee. Sugar-free or diet soda. [place] water. Lowfat or no-fat milk. Milk alternatives, such as soy or almond milk.  Fats and oils  Olive oil. Canola oil. Sunflower oil. Grapeseed oil. Avocado. Walnuts.  Sweets and desserts  Sugar-free or low-fat pudding. Sugar-free or low-fat ice cream and other frozen treats.  Seasoning and other foods  Herbs. Sodium-free spices. Mustard. Relish. Low-fat, low-sugar ketchup. Low-fat, low-sugar barbecue sauce. Low-fat or fat-free mayonnaise.  What foods are not recommended?  The items listed below may not be a  complete list. Talk with your dietitian about what dietary choices are best for you.  Grains  Refined white flour and flour products, such as bread, pasta, snack foods, and cereals.  Vegetables  Canned vegetables. Frozen vegetables with butter or cream sauce.  Fruits  Fruits canned with syrup.  Meats and other protein foods  Fatty cuts of meat. Poultry with skin. Breaded or fried meat. Processed meats.  Dairy  Full-fat yogurt, cheese, or milk.  Beverages  Sweetened drinks, such as sweet iced tea and soda.  Fats and oils  Butter. Lard. Ghee.  Sweets and desserts  Baked goods, such as cake, cupcakes, pastries, cookies, and cheesecake.  Seasoning and other foods  Spice mixes with added salt. Ketchup. Barbecue sauce. Mayonnaise.  Summary  · To prevent diabetes from developing, you may need to make diet and other lifestyle changes to help control blood sugar, improve cholesterol levels, and manage your blood pressure.  · Set weight loss goals with the help of your health care team. It is recommended that most people with prediabetes lose 7 percent of their current body weight.  · Consider following a Mediterranean diet that includes plenty of fresh fruits and vegetables, whole grains, beans, nuts, seeds, fish, lean meat, low-fat dairy, and healthy oils.  This information is not intended to replace advice given to you by your health care provider. Make sure you discuss any questions you have with your health care provider.  Document Released: 05/03/2016 Document Revised: 02/21/2018 Document Reviewed: 02/21/2018  [employer] Interactive Patient Education © 2019 [employer]    Calorie Counting for Weight Loss  Calories are units of energy. Your body needs a certain amount of calories from food to keep you going throughout the day. When you eat more calories than your body needs, your body stores the extra calories as fat. When you eat fewer calories than your body needs, your body burns fat to get the energy it  needs.  Calorie counting means keeping track of how many calories you eat and drink each day. Calorie counting can be helpful if you need to lose weight. If you make sure to eat fewer calories than your body needs, you should lose weight. Ask your health care provider what a healthy weight is for you.  For calorie counting to work, you will need to eat the right number of calories in a day in order to lose a healthy amount of weight per week. A dietitian can help you determine how many calories you need in a day and will give you suggestions on how to reach your calorie goal.  · A healthy amount of weight to lose per week is usually 1-2 lb (0.5-0.9 kg). This usually means that your daily calorie intake should be reduced by 500-750 calories.  · Eating 1,200 - 1,500 calories per day can help most women lose weight.  · Eating 1,500 - 1,800 calories per day can help most men lose weight.  What is my plan?  My goal is to have __________ calories per day.  If I have this many calories per day, I should lose around __________ pounds per week.  What do I need to know about calorie counting?  In order to meet your daily calorie goal, you will need to:  · Find out how many calories are in each food you would like to eat. Try to do this before you eat.  · Decide how much of the food you plan to eat.  · Write down what you ate and how many calories it had. Doing this is called keeping a food log.  To successfully lose weight, it is important to balance calorie counting with a healthy lifestyle that includes regular activity. Aim for 150 minutes of moderate exercise (such as walking) or 75 minutes of vigorous exercise (such as running) each week.  Where do I find calorie information?    The number of calories in a food can be found on a Nutrition Facts label. If a food does not have a Nutrition Facts label, try to look up the calories online or ask your dietitian for help.  Remember that calories are listed per serving. If you  choose to have more than one serving of a food, you will have to multiply the calories per serving by the amount of servings you plan to eat. For example, the label on a package of bread might say that a serving size is 1 slice and that there are 90 calories in a serving. If you eat 1 slice, you will have eaten 90 calories. If you eat 2 slices, you will have eaten 180 calories.  How do I keep a food log?  Immediately after each meal, record the following information in your food log:  · What you ate. Don't forget to include toppings, sauces, and other extras on the food.  · How much you ate. This can be measured in cups, ounces, or number of items.  · How many calories each food and drink had.  · The total number of calories in the meal.  Keep your food log near you, such as in a small notebook in your pocket, or use a mobile ellen or website. Some programs will calculate calories for you and show you how many calories you have left for the day to meet your goal.  What are some calorie counting tips?    · Use your calories on foods and drinks that will fill you up and not leave you hungry:  ? Some examples of foods that fill you up are nuts and nut butters, vegetables, lean proteins, and high-fiber foods like whole grains. High-fiber foods are foods with more than 5 g fiber per serving.  ? Drinks such as sodas, specialty coffee drinks, alcohol, and juices have a lot of calories, yet do not fill you up.  · Eat nutritious foods and avoid empty calories. Empty calories are calories you get from foods or beverages that do not have many vitamins or protein, such as candy, sweets, and soda. It is better to have a nutritious high-calorie food (such as an avocado) than a food with few nutrients (such as a bag of chips).  · Know how many calories are in the foods you eat most often. This will help you calculate calorie counts faster.  · Pay attention to calories in drinks. Low-calorie drinks include water and unsweetened  "drinks.  · Pay attention to nutrition labels for \"low fat\" or \"fat free\" foods. These foods sometimes have the same amount of calories or more calories than the full fat versions. They also often have added sugar, starch, or salt, to make up for flavor that was removed with the fat.  · Find a way of tracking calories that works for you. Get creative. Try different apps or programs if writing down calories does not work for you.  What are some portion control tips?  · Know how many calories are in a serving. This will help you know how many servings of a certain food you can have.  · Use a measuring cup to measure serving sizes. You could also try weighing out portions on a kitchen scale. With time, you will be able to estimate serving sizes for some foods.  · Take some time to put servings of different foods on your favorite plates, bowls, and cups so you know what a serving looks like.  · Try not to eat straight from a bag or box. Doing this can lead to overeating. Put the amount you would like to eat in a cup or on a plate to make sure you are eating the right portion.  · Use smaller plates, glasses, and bowls to prevent overeating.  · Try not to multitask (for example, watch TV or use your computer) while eating. If it is time to eat, sit down at a table and enjoy your food. This will help you to know when you are full. It will also help you to be aware of what you are eating and how much you are eating.  What are tips for following this plan?  Reading food labels  · Check the calorie count compared to the serving size. The serving size may be smaller than what you are used to eating.  · Check the source of the calories. Make sure the food you are eating is high in vitamins and protein and low in saturated and trans fats.  Shopping  · Read nutrition labels while you shop. This will help you make healthy decisions before you decide to purchase your food.  · Make a grocery list and stick to it.  Cooking  · Try to " "cook your favorite foods in a healthier way. For example, try baking instead of frying.  · Use low-fat dairy products.  Meal planning  · Use more fruits and vegetables. Half of your plate should be fruits and vegetables.  · Include lean proteins like poultry and fish.  How do I count calories when eating out?  · Ask for smaller portion sizes.  · Consider sharing an entree and sides instead of getting your own entree.  · If you get your own entree, eat only half. Ask for a box at the beginning of your meal and put the rest of your entree in it so you are not tempted to eat it.  · If calories are listed on the menu, choose the lower calorie options.  · Choose dishes that include vegetables, fruits, whole grains, low-fat dairy products, and lean protein.  · Choose items that are boiled, broiled, grilled, or steamed. Stay away from items that are buttered, battered, fried, or served with cream sauce. Items labeled \"crispy\" are usually fried, unless stated otherwise.  · Choose water, low-fat milk, unsweetened iced tea, or other drinks without added sugar. If you want an alcoholic beverage, choose a lower calorie option such as a glass of wine or light beer.  · Ask for dressings, sauces, and syrups on the side. These are usually high in calories, so you should limit the amount you eat.  · If you want a salad, choose a garden salad and ask for grilled meats. Avoid extra toppings like rose, cheese, or fried items. Ask for the dressing on the side, or ask for olive oil and vinegar or lemon to use as dressing.  · Estimate how many servings of a food you are given. For example, a serving of cooked rice is ½ cup or about the size of half a baseball. Knowing serving sizes will help you be aware of how much food you are eating at restaurants. The list below tells you how big or small some common portion sizes are based on everyday objects:  ? 1 oz--4 stacked dice.  ? 3 oz--1 deck of cards.  ? 1 tsp--1 die.  ? 1 Tbsp--½ a " ping-pong ball.  ? 2 Tbsp--1 ping-pong ball.  ? ½ cup--½ baseball.  ? 1 cup--1 baseball.  Summary  · Calorie counting means keeping track of how many calories you eat and drink each day. If you eat fewer calories than your body needs, you should lose weight.  · A healthy amount of weight to lose per week is usually 1-2 lb (0.5-0.9 kg). This usually means reducing your daily calorie intake by 500-750 calories.  · The number of calories in a food can be found on a Nutrition Facts label. If a food does not have a Nutrition Facts label, try to look up the calories online or ask your dietitian for help.  · Use your calories on foods and drinks that will fill you up, and not on foods and drinks that will leave you hungry.  · Use smaller plates, glasses, and bowls to prevent overeating.  This information is not intended to replace advice given to you by your health care provider. Make sure you discuss any questions you have with your health care provider.  Document Released: 12/18/2006 Document Revised: 09/06/2019 Document Reviewed: 11/17/2017  [employer] Interactive Patient Education © 2019 [employer]    Exercising to Lose Weight  Exercise is structured, repetitive physical activity to improve fitness and health. Getting regular exercise is important for everyone. It is especially important if you are overweight. Being overweight increases your risk of heart disease, stroke, diabetes, high blood pressure, and several types of cancer. Reducing your calorie intake and exercising can help you lose weight.  Exercise is usually categorized as moderate or vigorous intensity. To lose weight, most people need to do a certain amount of moderate-intensity or vigorous-intensity exercise each week.  Moderate-intensity exercise    Moderate-intensity exercise is any activity that gets you moving enough to burn at least three times more energy (calories) than if you were sitting.  Examples of moderate exercise include:  · Walking a  mile in 15 minutes.  · Doing light yard work.  · Biking at an easy pace.  Most people should get at least 150 minutes (2 hours and 30 minutes) a week of moderate-intensity exercise to maintain their body weight.  Vigorous-intensity exercise  Vigorous-intensity exercise is any activity that gets you moving enough to burn at least six times more calories than if you were sitting. When you exercise at this intensity, you should be working hard enough that you are not able to carry on a conversation.  Examples of vigorous exercise include:  · Running.  · Playing a team sport, such as football, basketball, and soccer.  · Jumping rope.  Most people should get at least 75 minutes (1 hour and 15 minutes) a week of vigorous-intensity exercise to maintain their body weight.  How can exercise affect me?  When you exercise enough to burn more calories than you eat, you lose weight. Exercise also reduces body fat and builds muscle. The more muscle you have, the more calories you burn. Exercise also:  · Improves mood.  · Reduces stress and tension.  · Improves your overall fitness, flexibility, and endurance.  · Increases bone strength.  The amount of exercise you need to lose weight depends on:  · Your age.  · The type of exercise.  · Any health conditions you have.  · Your overall physical ability.  Talk to your health care provider about how much exercise you need and what types of activities are safe for you.  What actions can I take to lose weight?  Nutrition    · Make changes to your diet as told by your health care provider or diet and nutrition specialist (dietitian). This may include:  ? Eating fewer calories.  ? Eating more protein.  ? Eating less unhealthy fats.  ? Eating a diet that includes fresh fruits and vegetables, whole grains, low-fat dairy products, and lean protein.  ? Avoiding foods with added fat, salt, and sugar.  · Drink plenty of water while you exercise to prevent dehydration or heat  stroke.  Activity  · Choose an activity that you enjoy and set realistic goals. Your health care provider can help you make an exercise plan that works for you.  · Exercise at a moderate or vigorous intensity most days of the week.  ? The intensity of exercise may vary from person to person. You can tell how intense a workout is for you by paying attention to your breathing and heartbeat. Most people will notice their breathing and heartbeat get faster with more intense exercise.  · Do resistance training twice each week, such as:  ? Push-ups.  ? Sit-ups.  ? Lifting weights.  ? Using resistance bands.  · Getting short amounts of exercise can be just as helpful as long structured periods of exercise. If you have trouble finding time to exercise, try to include exercise in your daily routine.  ? Get up, stretch, and walk around every 30 minutes throughout the day.  ? Go for a walk during your lunch break.  ? Park your car farther away from your destination.  ? If you take public transportation, get off one stop early and walk the rest of the way.  ? Make phone calls while standing up and walking around.  ? Take the stairs instead of elevators or escalators.  · Wear comfortable clothes and shoes with good support.  · Do not exercise so much that you hurt yourself, feel dizzy, or get very short of breath.  Where to find more information  · [place] Department of Health and Human Services: www.hhs.gov  · Centers for Disease Control and Prevention (CDC): www.cdc.gov  Contact a health care provider:  · Before starting a new exercise program.  · If you have questions or concerns about your weight.  · If you have a medical problem that keeps you from exercising.  Get help right away if you have any of the following while exercising:  · Injury.  · Dizziness.  · Difficulty breathing or shortness of breath that does not go away when you stop exercising.  · Chest pain.  · Rapid heartbeat.  Summary  · Being overweight increases your  risk of heart disease, stroke, diabetes, high blood pressure, and several types of cancer.  · Losing weight happens when you burn more calories than you eat.  · Reducing the amount of calories you eat in addition to getting regular moderate or vigorous exercise each week helps you lose weight.  This information is not intended to replace advice given to you by your health care provider. Make sure you discuss any questions you have with your health care provider.  Document Released: 01/20/2012 Document Revised: 12/31/2018 Document Reviewed: 12/31/2018  [employer] Interactive Patient Education © 2019 [employer]    Fall Prevention in the Home, Adult  Falls can cause injuries. They can happen to people of all ages. There are many things you can do to make your home safe and to help prevent falls. Ask for help when making these changes, if needed.  What actions can I take to prevent falls?  General Instructions  · Use good lighting in all rooms. Replace any light bulbs that burn out.  · Turn on the lights when you go into a dark area. Use night-lights.  · Keep items that you use often in easy-to-reach places. Lower the shelves around your home if necessary.  · Set up your furniture so you have a clear path. Avoid moving your furniture around.  · Do not have throw rugs and other things on the floor that can make you trip.  · Avoid walking on wet floors.  · If any of your floors are uneven, fix them.  · Add color or contrast paint or tape to clearly joce and help you see:  ? Any grab bars or handrails.  ? First and last steps of stairways.  ? Where the edge of each step is.  · If you use a stepladder:  ? Make sure that it is fully opened. Do not climb a closed stepladder.  ? Make sure that both sides of the stepladder are locked into place.  ? Ask someone to hold the stepladder for you while you use it.  · If there are any pets around you, be aware of where they are.  What can I do in the bathroom?         · Keep the  floor dry. Clean up any water that spills onto the floor as soon as it happens.  · Remove soap buildup in the tub or shower regularly.  · Use non-skid mats or decals on the floor of the tub or shower.  · Attach bath mats securely with double-sided, non-slip rug tape.  · If you need to sit down in the shower, use a plastic, non-slip stool.  · Install grab bars by the toilet and in the tub and shower. Do not use towel bars as grab bars.  What can I do in the bedroom?  · Make sure that you have a light by your bed that is easy to reach.  · Do not use any sheets or blankets that are too big for your bed. They should not hang down onto the floor.  · Have a firm chair that has side arms. You can use this for support while you get dressed.  What can I do in the kitchen?  · Clean up any spills right away.  · If you need to reach something above you, use a strong step stool that has a grab bar.  · Keep electrical cords out of the way.  · Do not use floor polish or wax that makes floors slippery. If you must use wax, use non-skid floor wax.  What can I do with my stairs?  · Do not leave any items on the stairs.  · Make sure that you have a light switch at the top of the stairs and the bottom of the stairs. If you do not have them, ask someone to add them for you.  · Make sure that there are handrails on both sides of the stairs, and use them. Fix handrails that are broken or loose. Make sure that handrails are as long as the stairways.  · Install non-slip stair treads on all stairs in your home.  · Avoid having throw rugs at the top or bottom of the stairs. If you do have throw rugs, attach them to the floor with carpet tape.  · Choose a carpet that does not hide the edge of the steps on the stairway.  · Check any carpeting to make sure that it is firmly attached to the stairs. Fix any carpet that is loose or worn.  What can I do on the outside of my home?  · Use bright outdoor lighting.  · Regularly fix the edges of walkways  and driveways and fix any cracks.  · Remove anything that might make you trip as you walk through a door, such as a raised step or threshold.  · Trim any bushes or trees on the path to your home.  · Regularly check to see if handrails are loose or broken. Make sure that both sides of any steps have handrails.  · Install guardrails along the edges of any raised decks and porches.  · Clear walking paths of anything that might make someone trip, such as tools or rocks.  · Have any leaves, snow, or ice cleared regularly.  · Use sand or salt on walking paths during winter.  · Clean up any spills in your garage right away. This includes grease or oil spills.  What other actions can I take?  · Wear shoes that:  ? Have a low heel. Do not wear high heels.  ? Have rubber bottoms.  ? Are comfortable and fit you well.  ? Are closed at the toe. Do not wear open-toe sandals.  · Use tools that help you move around (mobility aids) if they are needed. These include:  ? Canes.  ? Walkers.  ? Scooters.  ? Crutches.  · Review your medicines with your doctor. Some medicines can make you feel dizzy. This can increase your chance of falling.  Ask your doctor what other things you can do to help prevent falls.  Where to find more information  · Centers for Disease Control and Prevention, [place]: https://cdc.gov  · National [place] on Aging: https://dd7xjpi.brent.nih.gov  Contact a doctor if:  · You are afraid of falling at home.  · You feel weak, drowsy, or dizzy at home.  · You fall at home.  Summary  · There are many simple things that you can do to make your home safe and to help prevent falls.  · Ways to make your home safe include removing tripping hazards and installing grab bars in the bathroom.  · Ask for help when making these changes in your home.  This information is not intended to replace advice given to you by your health care provider. Make sure you discuss any questions you have with your health care provider.  Document  Released: 10/14/2010 Document Revised: 08/02/2018 Document Reviewed: 08/02/2018  Elsevier Interactive Patient Education © 2019 Elsevier Inc.

## 2019-11-18 NOTE — PROGRESS NOTES
Subjective   Darshan Bhatt is a 74 y.o. male.   Chief Complaint   Patient presents with   • Medicare Wellness-subsequent   • Hypothyroidism   • Hyperlipidemia   • Depression   • Hyperglycemia       History of Present Illness     1.  Hyperlipidemia-patient is on atorvastatin 40 mg a day.  He takes every day.  He has no muscle aches, but seldom muscle cramps.  LDL 46, HDL 56.  He continues to walk 5 days a week for 45 to 60 minutes.    He has a history of coronary artery disease.  He had stents placed in 2007.  He has A. fib and he follow-up with cardiologist dr García.  He is on sotalol and Xarelto.  He does not smoke cigarettes.  He is a former smoker.  He quit at age of 33.  He exercises regularly.  .  Family history is positive for stroke in his mother.     2.  Hypothyroidism-patient is post partial thyroidectomy was done years ago.  It was secondary to noncancerous condition.  Not otherwise specified.    He is on levothyroxine 125 mcg a day.  He takes it every day on empty stomach and does not eat for at least an hour.  TSH is at 1.05.    3.  Depression with anxiety-for years.  Patient is on Wellbutrin  mg a day.    His mood is normal most of the time.  He does not smoke or excessively. No suicidal ideation, no aggressive behaviors. PHQ9 at 9, ACOSTA-7 at 8.     4.  Impaired fasting glucose-A1c at 5.8 from 5.7.  BMI 27.7.    The following portions of the patient's history were reviewed and updated as appropriate: allergies, current medications, past family history, past medical history, past social history, past surgical history and problem list.    Review of Systems   Constitutional: Negative.    Respiratory: Negative for stridor.    Cardiovascular: Negative for chest pain.   Psychiatric/Behavioral: Negative for suicidal ideas.         Objective   Wt Readings from Last 3 Encounters:   11/18/19 92.5 kg (204 lb)   06/28/19 90.7 kg (200 lb)   05/10/19 90.7 kg (200 lb)      Vitals:    11/18/19 1041   BP: 100/60    Pulse: 74   Temp: 98.4 °F (36.9 °C)   SpO2: 98%     Temp Readings from Last 3 Encounters:   11/18/19 98.4 °F (36.9 °C)   06/28/19 98 °F (36.7 °C)   05/10/19 97.6 °F (36.4 °C) (Oral)     BP Readings from Last 3 Encounters:   11/18/19 100/60   06/28/19 90/52   05/10/19 116/72     Pulse Readings from Last 3 Encounters:   11/18/19 74   06/28/19 (!) 42   05/10/19 52     Body mass index is 27.66 kg/m².    Physical Exam   Constitutional: He is oriented to person, place, and time. He appears well-developed and well-nourished.   HENT:   Head: Normocephalic and atraumatic.   Neck: Neck supple. Carotid bruit is not present. No thyromegaly present.   Cardiovascular: Normal rate, regular rhythm and normal heart sounds.   Pulmonary/Chest: Effort normal and breath sounds normal.   Neurological: He is alert and oriented to person, place, and time.   Skin: Skin is warm, dry and intact.   Psychiatric: He has a normal mood and affect. His behavior is normal.       Assessment/Plan   Darshan was seen today for medicare wellness-subsequent, hypothyroidism, hyperlipidemia, depression and hyperglycemia.    Diagnoses and all orders for this visit:    Medicare annual wellness visit, subsequent    Familial hypercholesterolemia  -     Comprehensive Metabolic Panel; Future    Acquired hypothyroidism    IFG (impaired fasting glucose)  -     Comprehensive Metabolic Panel; Future  -     Hemoglobin A1c; Future    Anxiety and depression    Other orders  -     levothyroxine (SYNTHROID, LEVOTHROID) 125 MCG tablet; Take 1 tablet by mouth Daily.  -     buPROPion XL (WELLBUTRIN XL) 150 MG 24 hr tablet; Take 1 tablet by mouth Daily.  -     atorvastatin (LIPITOR) 40 MG tablet; Take 1 tablet by mouth Daily.        #1 HLP-continue current treatment.  Follow-up in 6 months.    2.  Impaired fasting glucose- it increases risk of developing diabetes.  Information on calorie count and exercise to lose weight provided.  Follow-up in 6 months.    3.   Hypothyroidism-continue current treatment.  Follow-up in 12 months.    4.  Depression with anxiety-continue same.  Follow-up in 6 months.

## 2020-03-16 ENCOUNTER — OFFICE VISIT (OUTPATIENT)
Dept: INTERNAL MEDICINE | Facility: CLINIC | Age: 75
End: 2020-03-16

## 2020-03-16 VITALS
WEIGHT: 206 LBS | BODY MASS INDEX: 27.9 KG/M2 | DIASTOLIC BLOOD PRESSURE: 60 MMHG | OXYGEN SATURATION: 98 % | HEART RATE: 58 BPM | SYSTOLIC BLOOD PRESSURE: 115 MMHG | TEMPERATURE: 97.9 F | HEIGHT: 72 IN

## 2020-03-16 DIAGNOSIS — R22.43 LOCALIZED SWELLING OF BOTH LOWER LEGS: Primary | ICD-10-CM

## 2020-03-16 PROCEDURE — 99213 OFFICE O/P EST LOW 20 MIN: CPT | Performed by: FAMILY MEDICINE

## 2020-03-16 NOTE — PROGRESS NOTES
Subjective   Darshan Bhatt is a 74 y.o. male.   Chief Complaint   Patient presents with   • Foot Swelling       History of Present Illness     #1  Swelling of feet and ankles-started about 1 to 2 months ago.  No redness, no pain associated.  It is worse at the end of the day.  He has compression stockings at home but he does not use them.  He has recently problems with the bladder. Recurrent urinary tract infections.  He has BPH and is scheduled for prostate surgery.  He has urinary cath inserted temporarily.   No chest pain.  No shortness of breath.    The following portions of the patient's history were reviewed and updated as appropriate: allergies, current medications, past medical history, past social history and problem list.    Review of Systems   Constitutional: Negative for chills and fever.   Respiratory: Negative for shortness of breath.    Cardiovascular: Positive for leg swelling. Negative for chest pain.         Objective   Wt Readings from Last 3 Encounters:   03/16/20 93.4 kg (206 lb)   11/18/19 92.5 kg (204 lb)   06/28/19 90.7 kg (200 lb)      Vitals:    03/16/20 1125   BP: 115/60   Pulse: 58   Temp: 97.9 °F (36.6 °C)   SpO2: 98%     Temp Readings from Last 3 Encounters:   03/16/20 97.9 °F (36.6 °C)   11/18/19 98.4 °F (36.9 °C)   06/28/19 98 °F (36.7 °C)     BP Readings from Last 3 Encounters:   03/16/20 115/60   11/18/19 100/60   06/28/19 90/52     Pulse Readings from Last 3 Encounters:   03/16/20 58   11/18/19 74   06/28/19 (!) 42     Body mass index is 27.93 kg/m².    Physical Exam   Constitutional: He is oriented to person, place, and time. He appears well-developed and well-nourished.   HENT:   Head: Normocephalic and atraumatic.   Neck: Neck supple. Carotid bruit is not present.   Cardiovascular: Normal rate, normal heart sounds and intact distal pulses.   Irregularly irregular.  Bilateral ankles and feet edema.  No redness, no tenderness.  Normal pulses.   Pulmonary/Chest: Effort normal and  breath sounds normal.   Neurological: He is alert and oriented to person, place, and time.   Skin: Skin is warm, dry and intact.   Psychiatric: He has a normal mood and affect. His behavior is normal.       Assessment/Plan   Darshan was seen today for foot swelling.    Diagnoses and all orders for this visit:    Localized swelling of both lower legs  -     Basic Metabolic Panel        #1 feet and ankle swelling- mild and asymptomatic.  I am advising use of compression stockings.  He will get compression stockings with zipper which will be easier to apply.  We are limited with the use of diuretics because of low blood pressure and the risk of falls.  Follow-up as needed.

## 2020-03-17 LAB
BUN SERPL-MCNC: 11 MG/DL (ref 8–23)
BUN/CREAT SERPL: 9.8 (ref 7–25)
CALCIUM SERPL-MCNC: 9 MG/DL (ref 8.6–10.5)
CHLORIDE SERPL-SCNC: 98 MMOL/L (ref 98–107)
CO2 SERPL-SCNC: 31.3 MMOL/L (ref 22–29)
CREAT SERPL-MCNC: 1.12 MG/DL (ref 0.76–1.27)
GLUCOSE SERPL-MCNC: 114 MG/DL (ref 65–99)
POTASSIUM SERPL-SCNC: 4.9 MMOL/L (ref 3.5–5.2)
SODIUM SERPL-SCNC: 140 MMOL/L (ref 136–145)

## 2020-04-21 ENCOUNTER — TELEPHONE (OUTPATIENT)
Dept: INTERNAL MEDICINE | Facility: CLINIC | Age: 75
End: 2020-04-21

## 2020-04-22 ENCOUNTER — OFFICE VISIT (OUTPATIENT)
Dept: INTERNAL MEDICINE | Facility: CLINIC | Age: 75
End: 2020-04-22

## 2020-04-22 VITALS — SYSTOLIC BLOOD PRESSURE: 126 MMHG | HEART RATE: 43 BPM | DIASTOLIC BLOOD PRESSURE: 63 MMHG

## 2020-04-22 DIAGNOSIS — R00.1 BRADYCARDIA: ICD-10-CM

## 2020-04-22 DIAGNOSIS — N13.8 BPH WITH URINARY OBSTRUCTION: Primary | ICD-10-CM

## 2020-04-22 DIAGNOSIS — D64.9 ANEMIA, UNSPECIFIED TYPE: ICD-10-CM

## 2020-04-22 DIAGNOSIS — R53.1 WEAKNESS: ICD-10-CM

## 2020-04-22 DIAGNOSIS — N40.1 BPH WITH URINARY OBSTRUCTION: Primary | ICD-10-CM

## 2020-04-22 PROCEDURE — 99443 PR PHYS/QHP TELEPHONE EVALUATION 21-30 MIN: CPT | Performed by: FAMILY MEDICINE

## 2020-04-22 RX ORDER — TAMSULOSIN HYDROCHLORIDE 0.4 MG/1
1 CAPSULE ORAL DAILY
COMMUNITY
Start: 2020-04-10 | End: 2021-11-19

## 2020-04-22 NOTE — PROGRESS NOTES
Subjective   Darshan Bhatt is a 74 y.o. male.   Chief Complaint   Patient presents with   • Benign Prostatic Hypertrophy   • Slow Heart Rate   • Anemia   • Fatigue       History of Present Illness     Telephone visit.    #1  BPH/#2 bradycardia #3 anemia/#3 fatigue- this is a hospital follow-up visit.  Patient was admitted to hospital from 4/8/20 to 4/10/20.  He was admitted for BPH with obstruction.  He underwent TURP on 4/8/2020.  There were no complications.  He was discharged home with Joshi catheter.  He was advised to be off Xarelto and aspirin until follow-up with Dr. Cox.  He saw Dr. Cox a week after discharge from hospital.  They attempted Joshi removal and voiding trial but he failed it.  He was treated with antibiotic.  He is not sure the name of it, but he completed treatment.  He is scheduled tomorrow with Dr. Cox.  He id still off aspirin and Xarelto per Dr. Cox recommendation. He has no fever, no chills.  No swelling.  He is off Flomax temporarily.  He is going to discuss it with Dr. Cox tomorrow.  After surgery his hemoglobin dropped to 12.4 from 15.6 in 2019.  He is currently on no iron.  He feels tired.  Home health was ordered to help to strengthen him and to help with balance.  He has chronic bradycardia.  He is asymptomatic.  He is scheduled with his cardiologist in May.    The following portions of the patient's history were reviewed and updated as appropriate: allergies, current medications, past medical history, past social history and problem list.    Review of Systems   Constitutional: Negative for chills and fever.   Respiratory: Negative for cough and shortness of breath.    Cardiovascular: Negative for chest pain.   Genitourinary: Negative for dysuria and penile swelling.         Objective   Wt Readings from Last 3 Encounters:   03/16/20 93.4 kg (206 lb)   11/18/19 92.5 kg (204 lb)   06/28/19 90.7 kg (200 lb)      Vitals:    04/22/20 1108   BP: 126/63   Pulse: (!) 43      Temp Readings from Last 3 Encounters:   03/16/20 97.9 °F (36.6 °C)   11/18/19 98.4 °F (36.9 °C)   06/28/19 98 °F (36.7 °C)     BP Readings from Last 3 Encounters:   04/22/20 126/63   03/16/20 115/60   11/18/19 100/60     Pulse Readings from Last 3 Encounters:   04/22/20 (!) 43   03/16/20 58   11/18/19 74     There is no height or weight on file to calculate BMI.    Physical Exam    Assessment/Plan   Darshan was seen today for benign prostatic hypertrophy, slow heart rate, anemia and fatigue.    Diagnoses and all orders for this visit:    BPH with urinary obstruction    Anemia, unspecified type  -     CBC (No Diff)  -     Iron Profile  -     Vitamin B12 & Folate    Bradycardia    Weakness        #1 BPH/#2 anemia/#3 bradycardia/#3 fatigue- hospital records were reviewed.  Medications are updated.  We are going to check CBC.  Will ask home health to draw blood.  Further recommendation depending on test results.  Patient is scheduled with his cardiologist in May.  He is advised that if bradycardia is associated with symptoms like chest pain, shortness of breath or lightheadedness he will need to go to ER.  He works with home health on strengthening.    This was a telephone visit.  I had medical discussion with patient from 11:50 AM to 12:14 PM

## 2020-04-28 ENCOUNTER — TELEPHONE (OUTPATIENT)
Dept: INTERNAL MEDICINE | Facility: CLINIC | Age: 75
End: 2020-04-28

## 2020-04-28 NOTE — TELEPHONE ENCOUNTER
EFREN WITH [facility] CALLED TO INFORM THAT PATIENT IS IN OBSERVATION AT [facility]. SHE IS CANCELLING LAB APPOINTMENT.    EFREN'S CALL BACK NUMBER [phone]

## 2020-05-01 ENCOUNTER — TELEPHONE (OUTPATIENT)
Dept: INTERNAL MEDICINE | Facility: CLINIC | Age: 75
End: 2020-05-01

## 2020-05-01 NOTE — TELEPHONE ENCOUNTER
MICKIE FROM [facility] STATED THAT THEY RESUMED SERVICES WITH THE PT TODAY. MICKIE STATED THEY ARE DOING NURSING, PHYSICAL THERAPY AND OT.    PLEASE ADVISE

## 2020-05-04 ENCOUNTER — ANESTHESIA EVENT (OUTPATIENT)
Dept: PERIOP | Facility: HOSPITAL | Age: 75
End: 2020-05-04

## 2020-05-04 ENCOUNTER — ANESTHESIA (OUTPATIENT)
Dept: PERIOP | Facility: HOSPITAL | Age: 75
End: 2020-05-04

## 2020-05-04 ENCOUNTER — HOSPITAL ENCOUNTER (OUTPATIENT)
Facility: HOSPITAL | Age: 75
Setting detail: HOSPITAL OUTPATIENT SURGERY
Discharge: HOME | End: 2020-05-04
Attending: UROLOGY | Admitting: UROLOGY

## 2020-05-04 VITALS
HEART RATE: 94 BPM | WEIGHT: 199.06 LBS | SYSTOLIC BLOOD PRESSURE: 125 MMHG | RESPIRATION RATE: 16 BRPM | OXYGEN SATURATION: 94 % | TEMPERATURE: 97.8 F | DIASTOLIC BLOOD PRESSURE: 85 MMHG | HEIGHT: 72 IN | BODY MASS INDEX: 26.96 KG/M2

## 2020-05-04 PROBLEM — R33.9 URINARY RETENTION: Status: ACTIVE | Noted: 2020-05-04

## 2020-05-04 PROCEDURE — 25010000002 SUCCINYLCHOLINE PER 20 MG: Performed by: NURSE ANESTHETIST, CERTIFIED REGISTERED

## 2020-05-04 PROCEDURE — C1894 INTRO/SHEATH, NON-LASER: HCPCS | Performed by: UROLOGY

## 2020-05-04 PROCEDURE — 25010000002 PROPOFOL 10 MG/ML EMULSION: Performed by: NURSE ANESTHETIST, CERTIFIED REGISTERED

## 2020-05-04 PROCEDURE — 25010000002 ONDANSETRON PER 1 MG: Performed by: NURSE ANESTHETIST, CERTIFIED REGISTERED

## 2020-05-04 PROCEDURE — 25010000002 FENTANYL CITRATE (PF) 100 MCG/2ML SOLUTION: Performed by: ANESTHESIOLOGY

## 2020-05-04 PROCEDURE — 25010000002 PHENYLEPHRINE PER 1 ML: Performed by: NURSE ANESTHETIST, CERTIFIED REGISTERED

## 2020-05-04 PROCEDURE — 25010000002 DEXAMETHASONE PER 1 MG: Performed by: NURSE ANESTHETIST, CERTIFIED REGISTERED

## 2020-05-04 RX ORDER — DIPHENHYDRAMINE HCL 25 MG
25 CAPSULE ORAL
Status: DISCONTINUED | OUTPATIENT
Start: 2020-05-04 | End: 2020-05-04 | Stop reason: HOSPADM

## 2020-05-04 RX ORDER — CEFTRIAXONE SODIUM 1 G/50ML
1 INJECTION, SOLUTION INTRAVENOUS ONCE
Status: DISCONTINUED | OUTPATIENT
Start: 2020-05-04 | End: 2020-05-04 | Stop reason: HOSPADM

## 2020-05-04 RX ORDER — ACETAMINOPHEN 325 MG/1
650 TABLET ORAL ONCE AS NEEDED
Status: DISCONTINUED | OUTPATIENT
Start: 2020-05-04 | End: 2020-05-04 | Stop reason: HOSPADM

## 2020-05-04 RX ORDER — LABETALOL HYDROCHLORIDE 5 MG/ML
5 INJECTION, SOLUTION INTRAVENOUS
Status: DISCONTINUED | OUTPATIENT
Start: 2020-05-04 | End: 2020-05-04 | Stop reason: HOSPADM

## 2020-05-04 RX ORDER — HYDRALAZINE HYDROCHLORIDE 20 MG/ML
5 INJECTION INTRAMUSCULAR; INTRAVENOUS
Status: DISCONTINUED | OUTPATIENT
Start: 2020-05-04 | End: 2020-05-04 | Stop reason: HOSPADM

## 2020-05-04 RX ORDER — SUCCINYLCHOLINE CHLORIDE 20 MG/ML
INJECTION INTRAMUSCULAR; INTRAVENOUS AS NEEDED
Status: DISCONTINUED | OUTPATIENT
Start: 2020-05-04 | End: 2020-05-04 | Stop reason: SURG

## 2020-05-04 RX ORDER — PROMETHAZINE HYDROCHLORIDE 25 MG/ML
6.25 INJECTION, SOLUTION INTRAMUSCULAR; INTRAVENOUS
Status: DISCONTINUED | OUTPATIENT
Start: 2020-05-04 | End: 2020-05-04 | Stop reason: HOSPADM

## 2020-05-04 RX ORDER — SODIUM CHLORIDE 0.9 % (FLUSH) 0.9 %
3-10 SYRINGE (ML) INJECTION AS NEEDED
Status: DISCONTINUED | OUTPATIENT
Start: 2020-05-04 | End: 2020-05-04 | Stop reason: HOSPADM

## 2020-05-04 RX ORDER — BUPIVACAINE HYDROCHLORIDE 2.5 MG/ML
INJECTION, SOLUTION INFILTRATION; PERINEURAL AS NEEDED
Status: DISCONTINUED | OUTPATIENT
Start: 2020-05-04 | End: 2020-05-04 | Stop reason: HOSPADM

## 2020-05-04 RX ORDER — SODIUM CHLORIDE, SODIUM LACTATE, POTASSIUM CHLORIDE, CALCIUM CHLORIDE 600; 310; 30; 20 MG/100ML; MG/100ML; MG/100ML; MG/100ML
9 INJECTION, SOLUTION INTRAVENOUS CONTINUOUS
Status: DISCONTINUED | OUTPATIENT
Start: 2020-05-04 | End: 2020-05-04 | Stop reason: HOSPADM

## 2020-05-04 RX ORDER — PROMETHAZINE HYDROCHLORIDE 25 MG/1
25 SUPPOSITORY RECTAL ONCE AS NEEDED
Status: DISCONTINUED | OUTPATIENT
Start: 2020-05-04 | End: 2020-05-04 | Stop reason: HOSPADM

## 2020-05-04 RX ORDER — SODIUM CHLORIDE, SODIUM LACTATE, POTASSIUM CHLORIDE, CALCIUM CHLORIDE 600; 310; 30; 20 MG/100ML; MG/100ML; MG/100ML; MG/100ML
INJECTION, SOLUTION INTRAVENOUS CONTINUOUS PRN
Status: DISCONTINUED | OUTPATIENT
Start: 2020-05-04 | End: 2020-05-04 | Stop reason: SURG

## 2020-05-04 RX ORDER — FENTANYL CITRATE 50 UG/ML
50 INJECTION, SOLUTION INTRAMUSCULAR; INTRAVENOUS
Status: DISCONTINUED | OUTPATIENT
Start: 2020-05-04 | End: 2020-05-04 | Stop reason: HOSPADM

## 2020-05-04 RX ORDER — LIDOCAINE HYDROCHLORIDE 10 MG/ML
0.5 INJECTION, SOLUTION EPIDURAL; INFILTRATION; INTRACAUDAL; PERINEURAL ONCE AS NEEDED
Status: DISCONTINUED | OUTPATIENT
Start: 2020-05-04 | End: 2020-05-04 | Stop reason: HOSPADM

## 2020-05-04 RX ORDER — DIPHENHYDRAMINE HYDROCHLORIDE 50 MG/ML
12.5 INJECTION INTRAMUSCULAR; INTRAVENOUS
Status: DISCONTINUED | OUTPATIENT
Start: 2020-05-04 | End: 2020-05-04 | Stop reason: HOSPADM

## 2020-05-04 RX ORDER — LIDOCAINE HYDROCHLORIDE 20 MG/ML
INJECTION, SOLUTION INFILTRATION; PERINEURAL AS NEEDED
Status: DISCONTINUED | OUTPATIENT
Start: 2020-05-04 | End: 2020-05-04 | Stop reason: SURG

## 2020-05-04 RX ORDER — PROMETHAZINE HYDROCHLORIDE 25 MG/1
25 TABLET ORAL ONCE AS NEEDED
Status: DISCONTINUED | OUTPATIENT
Start: 2020-05-04 | End: 2020-05-04 | Stop reason: HOSPADM

## 2020-05-04 RX ORDER — HYDROCODONE BITARTRATE AND ACETAMINOPHEN 7.5; 325 MG/1; MG/1
1 TABLET ORAL ONCE AS NEEDED
Status: COMPLETED | OUTPATIENT
Start: 2020-05-04 | End: 2020-05-04

## 2020-05-04 RX ORDER — ONDANSETRON 2 MG/ML
4 INJECTION INTRAMUSCULAR; INTRAVENOUS ONCE AS NEEDED
Status: DISCONTINUED | OUTPATIENT
Start: 2020-05-04 | End: 2020-05-04 | Stop reason: HOSPADM

## 2020-05-04 RX ORDER — DEXAMETHASONE SODIUM PHOSPHATE 10 MG/ML
INJECTION INTRAMUSCULAR; INTRAVENOUS AS NEEDED
Status: DISCONTINUED | OUTPATIENT
Start: 2020-05-04 | End: 2020-05-04 | Stop reason: SURG

## 2020-05-04 RX ORDER — HYDROMORPHONE HYDROCHLORIDE 1 MG/ML
0.5 INJECTION, SOLUTION INTRAMUSCULAR; INTRAVENOUS; SUBCUTANEOUS
Status: DISCONTINUED | OUTPATIENT
Start: 2020-05-04 | End: 2020-05-04 | Stop reason: HOSPADM

## 2020-05-04 RX ORDER — FAMOTIDINE 10 MG/ML
20 INJECTION, SOLUTION INTRAVENOUS ONCE
Status: COMPLETED | OUTPATIENT
Start: 2020-05-04 | End: 2020-05-04

## 2020-05-04 RX ORDER — EPHEDRINE SULFATE 50 MG/ML
5 INJECTION, SOLUTION INTRAVENOUS ONCE AS NEEDED
Status: DISCONTINUED | OUTPATIENT
Start: 2020-05-04 | End: 2020-05-04 | Stop reason: HOSPADM

## 2020-05-04 RX ORDER — SODIUM CHLORIDE 0.9 % (FLUSH) 0.9 %
3 SYRINGE (ML) INJECTION EVERY 12 HOURS SCHEDULED
Status: DISCONTINUED | OUTPATIENT
Start: 2020-05-04 | End: 2020-05-04 | Stop reason: HOSPADM

## 2020-05-04 RX ORDER — PROPOFOL 10 MG/ML
VIAL (ML) INTRAVENOUS AS NEEDED
Status: DISCONTINUED | OUTPATIENT
Start: 2020-05-04 | End: 2020-05-04 | Stop reason: SURG

## 2020-05-04 RX ORDER — ONDANSETRON 2 MG/ML
INJECTION INTRAMUSCULAR; INTRAVENOUS AS NEEDED
Status: DISCONTINUED | OUTPATIENT
Start: 2020-05-04 | End: 2020-05-04 | Stop reason: SURG

## 2020-05-04 RX ORDER — NALOXONE HCL 0.4 MG/ML
0.2 VIAL (ML) INJECTION AS NEEDED
Status: DISCONTINUED | OUTPATIENT
Start: 2020-05-04 | End: 2020-05-04 | Stop reason: HOSPADM

## 2020-05-04 RX ORDER — PROMETHAZINE HYDROCHLORIDE 25 MG/ML
12.5 INJECTION, SOLUTION INTRAMUSCULAR; INTRAVENOUS ONCE AS NEEDED
Status: DISCONTINUED | OUTPATIENT
Start: 2020-05-04 | End: 2020-05-04 | Stop reason: HOSPADM

## 2020-05-04 RX ORDER — SODIUM CHLORIDE 9 MG/ML
9 INJECTION, SOLUTION INTRAVENOUS CONTINUOUS
Status: DISCONTINUED | OUTPATIENT
Start: 2020-05-04 | End: 2020-05-04 | Stop reason: HOSPADM

## 2020-05-04 RX ORDER — OXYCODONE AND ACETAMINOPHEN 7.5; 325 MG/1; MG/1
1 TABLET ORAL ONCE AS NEEDED
Status: DISCONTINUED | OUTPATIENT
Start: 2020-05-04 | End: 2020-05-04 | Stop reason: HOSPADM

## 2020-05-04 RX ORDER — MAGNESIUM HYDROXIDE 1200 MG/15ML
LIQUID ORAL AS NEEDED
Status: DISCONTINUED | OUTPATIENT
Start: 2020-05-04 | End: 2020-05-04 | Stop reason: HOSPADM

## 2020-05-04 RX ORDER — FLUMAZENIL 0.1 MG/ML
0.2 INJECTION INTRAVENOUS AS NEEDED
Status: DISCONTINUED | OUTPATIENT
Start: 2020-05-04 | End: 2020-05-04 | Stop reason: HOSPADM

## 2020-05-04 RX ADMIN — SODIUM CHLORIDE, POTASSIUM CHLORIDE, SODIUM LACTATE AND CALCIUM CHLORIDE: 600; 310; 30; 20 INJECTION, SOLUTION INTRAVENOUS at 12:51

## 2020-05-04 RX ADMIN — FENTANYL CITRATE 50 MCG: 50 INJECTION INTRAMUSCULAR; INTRAVENOUS at 13:06

## 2020-05-04 RX ADMIN — SUCCINYLCHOLINE CHLORIDE 140 MG: 20 INJECTION, SOLUTION INTRAMUSCULAR; INTRAVENOUS; PARENTERAL at 13:06

## 2020-05-04 RX ADMIN — FAMOTIDINE 20 MG: 10 INJECTION, SOLUTION INTRAVENOUS at 11:56

## 2020-05-04 RX ADMIN — ONDANSETRON HYDROCHLORIDE 4 MG: 2 SOLUTION INTRAMUSCULAR; INTRAVENOUS at 13:40

## 2020-05-04 RX ADMIN — PHENYLEPHRINE HYDROCHLORIDE 100 MCG: 10 INJECTION INTRAVENOUS at 13:15

## 2020-05-04 RX ADMIN — DEXAMETHASONE SODIUM PHOSPHATE 6 MG: 10 INJECTION INTRAMUSCULAR; INTRAVENOUS at 13:25

## 2020-05-04 RX ADMIN — HYDROCODONE BITARTRATE AND ACETAMINOPHEN 1 TABLET: 7.5; 325 TABLET ORAL at 14:23

## 2020-05-04 RX ADMIN — SODIUM CHLORIDE 9 ML/HR: 9 INJECTION, SOLUTION INTRAVENOUS at 11:59

## 2020-05-04 RX ADMIN — LIDOCAINE HYDROCHLORIDE 100 MG: 20 INJECTION, SOLUTION INFILTRATION; PERINEURAL at 13:06

## 2020-05-04 RX ADMIN — PROPOFOL 200 MG: 10 INJECTION, EMULSION INTRAVENOUS at 13:06

## 2020-05-04 NOTE — BRIEF OP NOTE
SUPRAPUBIC CATHETER INSERTION  Progress Note    Darshan Bhatt  5/4/2020    Pre-op Diagnosis:   Urinary retention post TURP     Post-Op Diagnosis Codes:  Same open prostate channel for previous TURP    Procedure/CPT® Codes:      Procedure(s):  CYSTOSCOPY SUPRAPUBIC CATHETER PLACEMENT    Surgeon(s):  Linwood Cox MD    Anesthesia: General    Staff:   Circulator: Opal Esposito RN  Scrub Person: Yvette Suggs; Sunil Velez    Estimated Blood Loss: none    Urine Voided: * No values recorded between 5/4/2020 12:39 PM and 5/4/2020  1:36 PM *    Specimens:                None          Drains:   Suprapubic Catheter Non-latex;Double-lumen 16 Fr. (Active)       Findings: Open channel    Complications:       Linwood Cox MD     Date: 5/4/2020  Time: 13:40

## 2020-05-04 NOTE — OP NOTE
Prep diagnosis urinary retention prostatic obstruction post TURP    Postoperative diagnosis open prostate channel previous TURP superpubic catheter placed the bladder dome without difficulty    Operative procedure cystoscopy suprapubic catheter insertion    Surgeon Kenny    Anesthesia General plus local Marcaine    Procedure note 74-year-old presented with above-mentioned history underwent a TUR of the prostate yielding benign disease unable to urinate since working around his anticoagulation therapy discussion with him placement of an SP tube with a voiding reeducation trial understood agreed to proceed    Site was marked antibiotics given timeout was taken after COVID precautions were taken as well then in the room he was then shaved over superior area prepped draped sterile fashion to pick area and genitals and draped sterile fashion.  2% intraurethral lidocaine jelly was administered scope was advanced the bladder urethra was normal prostate channel itself was widely patent bladder neck was normal bladder showed moderate trabeculations no cellules diverticular bladder tumors Trendelenburg position the bladder was then filled I used a spinal needle to gauge both direction in depth with regards to placed in the supine catheter once this was delineated and infiltrated the space with quarter percent plain Marcaine then placed a pubic trocar through a stab wound skin at the bladder dome placing a 16 [place]onese silicone catheter with 10 cc in the balloon there was no signs of any active bleeding irrigated easily was anchored to the skin with a silk and then plugged procedure well was sent to the recovery in satisfactory condition.    I will call his wife Brittany at [phone] outpatient cystoscopy instruction sheet he will continue with his aspirin therapy staying off his Plavix until his urine is been clear for 1 week my office will call for follow-up appointment approximately 1 week as well progressive diet and  activity as outlined in the instruction sheet prescriptions written by me include Norco 5 mg #10 and Zofran 4 mg #6 and dictation

## 2020-05-04 NOTE — ANESTHESIA PROCEDURE NOTES
Airway  Urgency: elective    Date/Time: 5/4/2020 1:06 PM  Airway not difficult    General Information and Staff    Patient location during procedure: OR  Anesthesiologist: Tone Gonsalves MD  CRNA: John Luz CRNA    Indications and Patient Condition  Indications for airway management: airway protection    Preoxygenated: yes  MILS not maintained throughout  Mask difficulty assessment: 1 - vent by mask    Final Airway Details  Final airway type: endotracheal airway      Successful airway: ETT  Cuffed: yes   Successful intubation technique: direct laryngoscopy  Endotracheal tube insertion site: oral  Blade: Shea  Blade size: 3  ETT size (mm): 7.5  Cormack-Lehane Classification: grade I - full view of glottis  Placement verified by: chest auscultation   Cuff volume (mL): 8  Measured from: lips  ETT/EBT  to lips (cm): 21  Number of attempts at approach: 1  Assessment: lips, teeth, and gum same as pre-op and atraumatic intubation    Additional Comments  Pre O2, SIAI

## 2020-05-04 NOTE — H&P
[facility] History and Physical    Patient Care Team:  Blanca Hooper MD as PCP - General (Family Medicine)  Linwood Cox MD as Consulting Physician (Urology)  Darshan Cox MD as Consulting Physician (Dermatology)    Chief complain  Can not empty bladder     Subjective     Patient is a 74 y.o. male presents with retention bph  Turp post op retention hematuria  With castro no f/c  Urine clear off plavix on aspirin       Review of Systemsno f/c     Past Medical History:   Diagnosis Date   • A-fib (CMS/HCC)    • Allergic    • BPH (benign prostatic hyperplasia)    • CAD (coronary artery disease)    • Cancer (CMS/HCC)     skin   • Erectile dysfunction    • Hyperlipidemia    • Hypogonadism in male    • Hypothyroidism    • Movement disorder    • Parkinson's disease (CMS/HCC)    • Sleep apnea    • Thyroid disease      Past Surgical History:   Procedure Laterality Date   • CARDIAC CATHETERIZATION     • COLONOSCOPY     • INGUINAL HERNIA REPAIR     • PROSTATE SURGERY     • SKIN CANCER DESTRUCTION     • THYROIDECTOMY, PARTIAL       Family History   Problem Relation Age of Onset   • Heart disease Mother    • Diabetes Father    • Diabetes Paternal Grandmother      Social History     Tobacco Use   • Smoking status: Former Smoker   • Smokeless tobacco: Former User   Substance Use Topics   • Alcohol use: Yes   • Drug use: Not on file       Meds:  No medications prior to admission.       Allergies:  Meloxicam    Debilities:  none    Objective     Vital Signs     No intake or output data in the 24 hours ending 05/04/20 1109       Physical Exam:      General Appearance:    Alert, cooperative, in no acute distress   Head:    Normocephalic, without obvious abnormality, atraumatic   Eyes:            Lids and lashes normal, conjunctivae and sclerae normal, no   icterus, no pallor, corneas clear,    Ears:    Ears appear intact with no abnormalities noted   Throat:   No oral lesions, no thrush, oral mucosa moist    Neck:   No adenopathy, supple, trachea midline, no thyromegaly,  no JVD   Back:     No kyphosis present, no scoliosis present, no skin lesions,      erythema or scars, no tenderness to percussion or                   palpation,   range of motion normal   Lungs:   respirations regular, even and                  unlabored    Heart:    Regular rhythm and normal rate   Chest Wall:    No abnormalities observed   Abdomen:     Normal bowel sounds, no masses, no organomegaly, soft        non-tender, non-distended, no guarding, no rebound                tenderness   Rectal:   Joshi penis srotum testes nl    Extremities:   Moves all extremities well, no edema, no cyanosis, no             redness   Pulses:   Pulses palpable and equal bilaterally                 Results Review:    I reviewed the patient's new clinical results.  Results for orders placed or performed in visit on 03/16/20   Basic Metabolic Panel   Result Value Ref Range    Glucose 114 (H) 65 - 99 mg/dL    BUN 11 8 - 23 mg/dL    Creatinine 1.12 0.76 - 1.27 mg/dL    eGFR Non African Am 64 >60 mL/min/1.73    eGFR African Am 77 >60 mL/min/1.73    BUN/Creatinine Ratio 9.8 7.0 - 25.0    Sodium 140 136 - 145 mmol/L    Potassium 4.9 3.5 - 5.2 mmol/L    Chloride 98 98 - 107 mmol/L    Total CO2 31.3 (H) 22.0 - 29.0 mmol/L    Calcium 9.0 8.6 - 10.5 mg/dL        Assessment:  Urinary retention post turp  Neurogenic bladder     Plan:  Cystoscopy suprapubic catheter placement     I discussed the patients findings and my recommendations with patient and family.     Linwood Cox MD  05/04/20  11:09

## 2020-05-04 NOTE — ANESTHESIA POSTPROCEDURE EVALUATION
"Patient: Darshan Bhatt    Procedure Summary     Date:  05/04/20 Room / Location:  [facility] OR 01 / [facility] MAIN OR    Anesthesia Start:  1249 Anesthesia Stop:  1406    Procedure:  CYSTOSCOPY SUPRAPUBIC CATHETER PLACEMENT (N/A ) Diagnosis:      Surgeon:  Linwood Cox MD Provider:  Tone Gonsalves MD    Anesthesia Type:  general ASA Status:  3          Anesthesia Type: general    Vitals  Vitals Value Taken Time   /87 5/4/2020  2:31 PM   Temp 36.6 °C (97.8 °F) 5/4/2020  2:30 PM   Pulse 96 5/4/2020  2:37 PM   Resp 16 5/4/2020  2:30 PM   SpO2 97 % 5/4/2020  2:41 PM   Vitals shown include unvalidated device data.        Post Anesthesia Care and Evaluation    Patient location during evaluation: bedside  Patient participation: complete - patient participated  Level of consciousness: awake and alert  Pain score: 0  Pain management: adequate  Airway patency: patent  Anesthetic complications: No anesthetic complications    Cardiovascular status: acceptable  Respiratory status: acceptable  Hydration status: acceptable    Comments: /87 (BP Location: Right arm, Patient Position: Lying)   Pulse 82   Temp 36.6 °C (97.8 °F) (Oral)   Resp 16   Ht 182.9 cm (72\")   Wt 90.3 kg (199 lb 1 oz)   SpO2 100%   BMI 27.00 kg/m²       "

## 2020-05-04 NOTE — DISCHARGE INSTRUCTIONS
"    You were given Norco (pain pill) today at 2:25 pm. You may have the next dose at 6:25 pm today.       Follow the \"Cysto\" sheet given.    Patient to plug the catheter with plug provided then attempt to urinate naturally when he feels like he needs to void.  Then drain the suprapubic catheter & monitor how much urine is drained after voiding.    "

## 2020-05-04 NOTE — ANESTHESIA PREPROCEDURE EVALUATION
Anesthesia Evaluation     Patient summary reviewed and Nursing notes reviewed   no history of anesthetic complications:  NPO Solid Status: > 8 hours  NPO Liquid Status: > 2 hours           Airway   Mallampati: II  TM distance: >3 FB  Neck ROM: full  no difficulty expected  Dental - normal exam     Pulmonary - normal exam   (+) a smoker Former, sleep apnea on CPAP,   (-) COPD, asthma, lung cancer  Cardiovascular - normal exam  Exercise tolerance: good (4-7 METS)    ECG reviewed  PT is on anticoagulation therapy  Rhythm: regular  Rate: normal    (+) hypertension poorly controlled, CAD, cardiac stents more than 12 months ago dysrhythmias Atrial Fib, angina with exertion, hyperlipidemia,   (-) valvular problems/murmurs, past MI, CABG    ROS comment: Known CAD s/p single stent placement 2007. History of angina, using NTG prn.    Neuro/Psych  (+) dizziness/light headedness, psychiatric history Anxiety and Depression,     (-) seizures, TIA, CVA  GI/Hepatic/Renal/Endo    (+)   thyroid problem hypothyroidism  (-) hiatal hernia, GERD, PUD, hepatitis, liver disease, no renal disease, diabetes, GI bleed    Musculoskeletal (-) negative ROS    Abdominal  - normal exam   Substance History - negative use     OB/GYN negative ob/gyn ROS         Other      history of cancer remission                    Anesthesia Plan    ASA 3     general     intravenous induction     Anesthetic plan, all risks, benefits, and alternatives have been provided, discussed and informed consent has been obtained with: patient.    Plan discussed with CRNA and attending.

## 2020-05-15 LAB
ALBUMIN SERPL-MCNC: 3.9 G/DL (ref 3.5–5.2)
ALBUMIN/GLOB SERPL: 1.4 G/DL
ALP SERPL-CCNC: 56 U/L (ref 39–117)
ALT SERPL-CCNC: 5 U/L (ref 1–41)
AST SERPL-CCNC: 13 U/L (ref 1–40)
BILIRUB SERPL-MCNC: 0.7 MG/DL (ref 0.2–1.2)
BUN SERPL-MCNC: 18 MG/DL (ref 8–23)
BUN/CREAT SERPL: 14.5 (ref 7–25)
CALCIUM SERPL-MCNC: 9.3 MG/DL (ref 8.6–10.5)
CHLORIDE SERPL-SCNC: 102 MMOL/L (ref 98–107)
CO2 SERPL-SCNC: 31.1 MMOL/L (ref 22–29)
CREAT SERPL-MCNC: 1.24 MG/DL (ref 0.76–1.27)
GLOBULIN SER CALC-MCNC: 2.7 GM/DL
GLUCOSE SERPL-MCNC: 105 MG/DL (ref 65–99)
HBA1C MFR BLD: 5.9 % (ref 4.8–5.6)
POTASSIUM SERPL-SCNC: 4.8 MMOL/L (ref 3.5–5.2)
PROT SERPL-MCNC: 6.6 G/DL (ref 6–8.5)
SODIUM SERPL-SCNC: 143 MMOL/L (ref 136–145)

## 2020-05-16 ENCOUNTER — RESULTS ENCOUNTER (OUTPATIENT)
Dept: INTERNAL MEDICINE | Facility: CLINIC | Age: 75
End: 2020-05-16

## 2020-05-16 DIAGNOSIS — R73.01 IFG (IMPAIRED FASTING GLUCOSE): ICD-10-CM

## 2020-05-16 DIAGNOSIS — E78.01 FAMILIAL HYPERCHOLESTEROLEMIA: ICD-10-CM

## 2020-05-19 ENCOUNTER — OFFICE VISIT (OUTPATIENT)
Dept: INTERNAL MEDICINE | Facility: CLINIC | Age: 75
End: 2020-05-19

## 2020-05-19 VITALS
WEIGHT: 199 LBS | HEIGHT: 72 IN | HEART RATE: 52 BPM | BODY MASS INDEX: 26.95 KG/M2 | TEMPERATURE: 97 F | OXYGEN SATURATION: 98 % | DIASTOLIC BLOOD PRESSURE: 70 MMHG | SYSTOLIC BLOOD PRESSURE: 104 MMHG

## 2020-05-19 DIAGNOSIS — E53.8 VITAMIN B12 DEFICIENCY: ICD-10-CM

## 2020-05-19 DIAGNOSIS — Z11.59 NEED FOR HEPATITIS C SCREENING TEST: ICD-10-CM

## 2020-05-19 DIAGNOSIS — F32.A ANXIETY AND DEPRESSION: ICD-10-CM

## 2020-05-19 DIAGNOSIS — F41.9 ANXIETY AND DEPRESSION: ICD-10-CM

## 2020-05-19 DIAGNOSIS — R73.01 IFG (IMPAIRED FASTING GLUCOSE): ICD-10-CM

## 2020-05-19 DIAGNOSIS — E78.2 MIXED HYPERLIPIDEMIA: Primary | ICD-10-CM

## 2020-05-19 DIAGNOSIS — E03.9 ACQUIRED HYPOTHYROIDISM: ICD-10-CM

## 2020-05-19 PROCEDURE — 99214 OFFICE O/P EST MOD 30 MIN: CPT | Performed by: FAMILY MEDICINE

## 2020-05-19 RX ORDER — ATORVASTATIN CALCIUM 40 MG/1
40 TABLET, FILM COATED ORAL DAILY
Qty: 90 TABLET | Refills: 1 | Status: SHIPPED | OUTPATIENT
Start: 2020-05-19 | End: 2020-11-16 | Stop reason: SDUPTHER

## 2020-05-19 RX ORDER — PANTOPRAZOLE SODIUM 40 MG/1
40 TABLET, DELAYED RELEASE ORAL DAILY
COMMUNITY
End: 2020-09-25 | Stop reason: SDUPTHER

## 2020-05-19 RX ORDER — BUPROPION HYDROCHLORIDE 150 MG/1
150 TABLET ORAL DAILY
Qty: 90 TABLET | Refills: 1 | Status: SHIPPED | OUTPATIENT
Start: 2020-05-19 | End: 2020-11-16 | Stop reason: SDUPTHER

## 2020-05-19 NOTE — PATIENT INSTRUCTIONS
Prediabetes Eating Plan  Prediabetes is a condition that causes blood sugar (glucose) levels to be higher than normal. This increases the risk for developing diabetes. In order to prevent diabetes from developing, your health care provider may recommend a diet and other lifestyle changes to help you:  · Control your blood glucose levels.  · Improve your cholesterol levels.  · Manage your blood pressure.  Your health care provider may recommend working with a diet and nutrition specialist (dietitian) to make a meal plan that is best for you.  What are tips for following this plan?  Lifestyle  · Set weight loss goals with the help of your health care team. It is recommended that most people with prediabetes lose 7% of their current body weight.  · Exercise for at least 30 minutes at least 5 days a week.  · Attend a support group or seek ongoing support from a mental health counselor.  · Take over-the-counter and prescription medicines only as told by your health care provider.  Reading food labels  · Read food labels to check the amount of fat, salt (sodium), and sugar in prepackaged foods. Avoid foods that have:  ? Saturated fats.  ? Trans fats.  ? Added sugars.  · Avoid foods that have more than 300 milligrams (mg) of sodium per serving. Limit your daily sodium intake to less than 2,300 mg each day.  Shopping  · Avoid buying pre-made and processed foods.  Cooking  · Cook with olive oil. Do not use butter, lard, or ghee.  · Bake, broil, grill, or boil foods. Avoid frying.  Meal planning    · Work with your dietitian to develop an eating plan that is right for you. This may include:  ? Tracking how many calories you take in. Use a food diary, notebook, or mobile application to track what you eat at each meal.  ? Using the glycemic index (GI) to plan your meals. The index tells you how quickly a food will raise your blood glucose. Choose low-GI foods. These foods take a longer time to raise blood glucose.  · Consider  following a Mediterranean diet. This diet includes:  ? Several servings each day of fresh fruits and vegetables.  ? Eating fish at least twice a week.  ? Several servings each day of whole grains, beans, nuts, and seeds.  ? Using olive oil instead of other fats.  ? Moderate alcohol consumption.  ? Eating small amounts of red meat and whole-fat dairy.  · If you have high blood pressure, you may need to limit your sodium intake or follow a diet such as the DASH eating plan. DASH is an eating plan that aims to lower high blood pressure.  What foods are recommended?  The items listed below may not be a complete list. Talk with your dietitian about what dietary choices are best for you.  Grains  Whole grains, such as whole-wheat or whole-grain breads, crackers, cereals, and pasta. Unsweetened oatmeal. Bulgur. Barley. Quinoa. Brown rice. Corn or whole-wheat flour tortillas or taco shells.  Vegetables  Lettuce. Spinach. Peas. Beets. Cauliflower. Cabbage. Broccoli. Carrots. Tomatoes. Squash. Eggplant. Herbs. Peppers. Onions. Cucumbers. [place] sprouts.  Fruits  Berries. Bananas. Apples. Oranges. Grapes. Papaya. [place]. Pomegranate. Kiwi. Grapefruit. Cherries.  Meats and other protein foods  Seafood. Poultry without skin. Lean cuts of pork and beef. Tofu. Eggs. Nuts. Beans.  Dairy  Low-fat or fat-free dairy products, such as yogurt, cottage cheese, and cheese.  Beverages  Water. Tea. Coffee. Sugar-free or diet soda. [place] water. Lowfat or no-fat milk. Milk alternatives, such as soy or almond milk.  Fats and oils  Olive oil. Canola oil. Sunflower oil. Grapeseed oil. Avocado. Walnuts.  Sweets and desserts  Sugar-free or low-fat pudding. Sugar-free or low-fat ice cream and other frozen treats.  Seasoning and other foods  Herbs. Sodium-free spices. Mustard. Relish. Low-fat, low-sugar ketchup. Low-fat, low-sugar barbecue sauce. Low-fat or fat-free mayonnaise.  What foods are not recommended?  The items listed below may not be a  complete list. Talk with your dietitian about what dietary choices are best for you.  Grains  Refined white flour and flour products, such as bread, pasta, snack foods, and cereals.  Vegetables  Canned vegetables. Frozen vegetables with butter or cream sauce.  Fruits  Fruits canned with syrup.  Meats and other protein foods  Fatty cuts of meat. Poultry with skin. Breaded or fried meat. Processed meats.  Dairy  Full-fat yogurt, cheese, or milk.  Beverages  Sweetened drinks, such as sweet iced tea and soda.  Fats and oils  Butter. Lard. Ghee.  Sweets and desserts  Baked goods, such as cake, cupcakes, pastries, cookies, and cheesecake.  Seasoning and other foods  Spice mixes with added salt. Ketchup. Barbecue sauce. Mayonnaise.  Summary  · To prevent diabetes from developing, you may need to make diet and other lifestyle changes to help control blood sugar, improve cholesterol levels, and manage your blood pressure.  · Set weight loss goals with the help of your health care team. It is recommended that most people with prediabetes lose 7 percent of their current body weight.  · Consider following a Mediterranean diet that includes plenty of fresh fruits and vegetables, whole grains, beans, nuts, seeds, fish, lean meat, low-fat dairy, and healthy oils.  This information is not intended to replace advice given to you by your health care provider. Make sure you discuss any questions you have with your health care provider.  Document Released: 05/03/2016 Document Revised: 02/21/2018 Document Reviewed: 02/21/2018  [employer] Interactive Patient Education © 2020 [employer]

## 2020-05-19 NOTE — PROGRESS NOTES
Subjective   Darshan Bhatt is a 74 y.o. male.   Chief Complaint   Patient presents with   • Hyperlipidemia   • Hyperglycemia   • Anxiety       History of Present Illness     1.  Hyperlipidemia-patient is on atorvastatin 40 mg a day.  He takes every day.  He has no muscle aches, but seldom muscle cramps.       He has a history of coronary artery disease.  He had stents placed in 2007.  He has A. fib and he follow-up with cardiologist dr García.  He is on sotalol 40 mg twice a day and Xarelto.  He does not smoke cigarettes.  He is a former smoker.  He quit at age of 33.  He exercises regularly.  .  Family history is positive for stroke in his mother.     2.  Depression with anxiety-for years.  Patient is on Wellbutrin  mg a day.    His mood is normal most of the time.  He worries some, but is comfortable with current dose of Wellbutrin.  No suicidal ideation, no aggressive behaviors. PHQ9 at 12, ACOSTA-7 at 13.     3.  Impaired fasting glucose-A1c at  5.9 from 5.8 from 5.7.  Fasting blood sugar 105.    The following portions of the patient's history were reviewed and updated as appropriate: allergies, current medications, past medical history, past social history and problem list.    Review of Systems   Constitutional: Negative for chills and fever.   Respiratory: Negative for cough and shortness of breath.    Cardiovascular: Negative for chest pain.   Psychiatric/Behavioral: Negative for suicidal ideas.         Objective   Wt Readings from Last 3 Encounters:   05/19/20 90.3 kg (199 lb)   05/04/20 90.3 kg (199 lb 1 oz)   03/16/20 93.4 kg (206 lb)      Vitals:    05/19/20 1308   BP: 104/70   Pulse: 52   Temp: 97 °F (36.1 °C)   SpO2: 98%     Temp Readings from Last 3 Encounters:   05/19/20 97 °F (36.1 °C)   05/04/20 97.8 °F (36.6 °C) (Oral)   03/16/20 97.9 °F (36.6 °C)     BP Readings from Last 3 Encounters:   05/19/20 104/70   05/04/20 125/85   04/22/20 126/63     Pulse Readings from Last 3 Encounters:   05/19/20 52    05/04/20 94   04/22/20 (!) 43     Body mass index is 26.98 kg/m².    Physical Exam   Constitutional: He appears well-developed and well-nourished.   HENT:   Head: Normocephalic and atraumatic.   Neck: Neck supple. Carotid bruit is not present. No thyromegaly present.   Cardiovascular: Normal rate, regular rhythm and normal heart sounds.   Pulmonary/Chest: Effort normal and breath sounds normal.   Neurological: He is alert.   Skin: Skin is warm, dry and intact.   Psychiatric: He has a normal mood and affect. His behavior is normal.       Assessment/Plan   Darshan was seen today for hyperlipidemia, hyperglycemia and anxiety.    Diagnoses and all orders for this visit:    Mixed hyperlipidemia  -     Basic Metabolic Panel; Future  -     Hemoglobin A1c; Future  -     Lipid Panel With LDL / HDL Ratio; Future    IFG (impaired fasting glucose)  -     Basic Metabolic Panel; Future  -     Hemoglobin A1c; Future  -     Lipid Panel With LDL / HDL Ratio; Future    Anxiety and depression    Acquired hypothyroidism  -     Thyroid Cascade Profile; Future    Vitamin B12 deficiency  -     Vitamin B12; Future    Need for hepatitis C screening test  -     Hepatitis C Antibody; Future    Other orders  -     buPROPion XL (WELLBUTRIN XL) 150 MG 24 hr tablet; Take 1 tablet by mouth Daily.  -     atorvastatin (LIPITOR) 40 MG tablet; Take 1 tablet by mouth Daily.        #1 hyperlipidemia-continue current treatment.  Labs in 6 months before office visit.  2.  Impaired fasting glucose-worsening.  Information on diet for prediabetes provided.  Exercise at least 30 minutes a day, 5 days a week.  Follow-up in 6 months.  3.  Anxiety-continue same.  Follow-up in 6 months, or sooner if problems.

## 2020-09-24 NOTE — PROGRESS NOTES
Attempt #2 to follow up on SI joint injection, Right performed by Ricci Naidu MD on 9/16/2020.  Left message for patient to return call at [phone].     Physical Therapy Initial Evaluation and Plan of Care      Patient: Darshan Bhatt   : 1945  Diagnosis/ICD-10 Code:  Secondary adhesive capsulitis of shoulder, right [M75.01]  Referring practitioner: Adarsh Pratt MD    Subjective Evaluation    History of Present Illness  Mechanism of injury: Moving concrete blocks in January  PMH fracture clavicle  and L RTC   PAin R shoulder abduction 90 degrees. So I rested it and got worse. Left shoulder more intense. Pain shoulder blade with IR.  Fell in bathtub in December.  Sleeping painful on sides  I have a stint. AFib since  Occasional vertigo from low to high stairs  Hypothyroid;      Patient Occupation: retired [employer]  Current pain ratin  At worst pain ratin  Quality: sharp  Relieving factors: support  Aggravating factors: sleeping, outstretched reach, overhead activity and movement  Progression: improved (slightly better)    Social Support  Lives in: multiple-level home  Lives with: alone    Diagnostic Tests  No diagnostic tests performed    Treatments  No previous or current treatments  Patient Goals  Patient goals for therapy: decreased pain, independence with ADLs/IADLs and increased motion             Objective       Active Range of Motion   Left Shoulder   Flexion: 135 degrees   Abduction: 110 degrees     Right Shoulder   Flexion: 105 degrees with pain  Abduction: 100 degrees     Passive Range of Motion   Left Shoulder   Normal passive range of motion    Right Shoulder   Flexion: 135 degrees   Abduction: 110 degrees with pain  External rotation 45°: 41 degrees with pain  Internal rotation 90°: 70 degrees     Strength/Myotome Testing     Left Shoulder   Normal muscle strength    Right Shoulder   Normal muscle strength    Tests     Right Shoulder   Positive Neer's.   Negative apprehension, crossover, drop arm, empty can, full can and Hawkin's.          Assessment & Plan     Assessment  Impairments: abnormal or  restricted ROM, activity intolerance, lacks appropriate home exercise program and pain with function  Assessment details: Pt is a good candidate for skilled PT intervention, kt manual therapy for GH joint mobility, alignment, postural restoration and core strengthening to restore functional AROM and strength to return to previous level of ADL's.      Prognosis: good  Prognosis details:     Short term Goals ( 2 weeks)    1. Pt to demonstrate proper scapulohumeral alignment to allow for improved AROM with less pain  2. Pt to exhibit 120 degrees of shoulder flexion and 120 degrees of abduction to allow for necessary reaching  3. Pt to be independent with HEP  4. Pt to report less pain with sleeping    Long Term Goals ( 4 weeks)  1. Pt to exhibit shoulder flexion  AROM to 145 degrees to allow for ADL's  and personal care  2. Pt to exhibit IR behind the back to T12 to allow for dreesing and bathing with less pain.  3. Pt to perform household chores and recreational tasks with min to no pain  4. Pt to score < 15% on DASH  Functional Limitations: lifting, sleeping, pushing, uncomfortable because of pain, reaching behind back, reaching overhead and unable to perform repetitive tasks  Plan  Therapy options: will be seen for skilled physical therapy services  Planned modality interventions: ultrasound  Planned therapy interventions: manual therapy, postural training, soft tissue mobilization, strengthening, stretching, joint mobilization, home exercise program and functional ROM exercises  Frequency: 2x week  Duration in weeks: 6  Treatment plan discussed with: patient        Manual Therapy:    14    mins  62898;  Therapeutic Exercise:    12     mins  58754;     Neuromuscular Staci:        mins  29716;    Therapeutic Activity:          mins  64985;     Gait Training:           mins  26074;     Ultrasound:          mins  04331;    Electrical Stimulation:         mins  93696 ( );  Dry Needling          mins  self-pay    Timed Treatment:   26   mins   Total Treatment:     56   mins    PT SIGNATURE: Dee Clark, ROMMEL   [place] License # [id number]  DATE TREATMENT INITIATED: 3/8/2018    Medicare Initial Certification  Certification Period: 6/6/2018  I certify that the therapy services are furnished while this patient is under my care.  The services outlined above are required by this patient, and will be reviewed every 90 days.     PHYSICIAN: Adarsh Pratt MD      DATE:     Please sign and return via fax to [phone].. Thank you, [facility].

## 2020-11-09 DIAGNOSIS — Z11.59 NEED FOR HEPATITIS C SCREENING TEST: ICD-10-CM

## 2020-11-09 DIAGNOSIS — E78.2 MIXED HYPERLIPIDEMIA: ICD-10-CM

## 2020-11-09 DIAGNOSIS — E53.8 VITAMIN B12 DEFICIENCY: ICD-10-CM

## 2020-11-09 DIAGNOSIS — R73.01 IFG (IMPAIRED FASTING GLUCOSE): ICD-10-CM

## 2020-11-09 DIAGNOSIS — E03.9 ACQUIRED HYPOTHYROIDISM: ICD-10-CM

## 2020-11-12 LAB
BUN SERPL-MCNC: 18 MG/DL (ref 8–23)
BUN/CREAT SERPL: 13.6 (ref 7–25)
CALCIUM SERPL-MCNC: 9.1 MG/DL (ref 8.6–10.5)
CHLORIDE SERPL-SCNC: 104 MMOL/L (ref 98–107)
CHOLEST SERPL-MCNC: 133 MG/DL (ref 0–200)
CO2 SERPL-SCNC: 29.1 MMOL/L (ref 22–29)
CREAT SERPL-MCNC: 1.32 MG/DL (ref 0.76–1.27)
GLUCOSE SERPL-MCNC: 91 MG/DL (ref 65–99)
HBA1C MFR BLD: 5.8 % (ref 4.8–5.6)
HCV AB S/CO SERPL IA: <0.1 S/CO RATIO (ref 0–0.9)
HDLC SERPL-MCNC: 69 MG/DL (ref 40–60)
LDLC SERPL CALC-MCNC: 53 MG/DL (ref 0–100)
LDLC/HDLC SERPL: 0.78 {RATIO}
POTASSIUM SERPL-SCNC: 4.8 MMOL/L (ref 3.5–5.2)
SODIUM SERPL-SCNC: 143 MMOL/L (ref 136–145)
TRIGL SERPL-MCNC: 50 MG/DL (ref 0–150)
TSH SERPL DL<=0.005 MIU/L-ACNC: 3.12 UIU/ML (ref 0.45–4.5)
VIT B12 SERPL-MCNC: 392 PG/ML (ref 211–946)
VLDLC SERPL CALC-MCNC: 11 MG/DL (ref 5–40)

## 2020-11-16 ENCOUNTER — OFFICE VISIT (OUTPATIENT)
Dept: INTERNAL MEDICINE | Facility: CLINIC | Age: 75
End: 2020-11-16

## 2020-11-16 VITALS
BODY MASS INDEX: 28.17 KG/M2 | HEIGHT: 72 IN | DIASTOLIC BLOOD PRESSURE: 70 MMHG | WEIGHT: 208 LBS | TEMPERATURE: 98 F | HEART RATE: 96 BPM | OXYGEN SATURATION: 98 % | SYSTOLIC BLOOD PRESSURE: 104 MMHG

## 2020-11-16 DIAGNOSIS — F32.A ANXIETY AND DEPRESSION: ICD-10-CM

## 2020-11-16 DIAGNOSIS — N18.31 STAGE 3A CHRONIC KIDNEY DISEASE (HCC): ICD-10-CM

## 2020-11-16 DIAGNOSIS — F41.9 ANXIETY AND DEPRESSION: ICD-10-CM

## 2020-11-16 DIAGNOSIS — E78.2 MIXED HYPERLIPIDEMIA: Primary | ICD-10-CM

## 2020-11-16 DIAGNOSIS — R73.01 IFG (IMPAIRED FASTING GLUCOSE): ICD-10-CM

## 2020-11-16 DIAGNOSIS — E03.9 ACQUIRED HYPOTHYROIDISM: ICD-10-CM

## 2020-11-16 DIAGNOSIS — E53.8 VITAMIN B12 DEFICIENCY: ICD-10-CM

## 2020-11-16 PROCEDURE — 99214 OFFICE O/P EST MOD 30 MIN: CPT | Performed by: FAMILY MEDICINE

## 2020-11-16 RX ORDER — LEVOTHYROXINE SODIUM 0.12 MG/1
125 TABLET ORAL DAILY
Qty: 90 TABLET | Refills: 3 | Status: SHIPPED | OUTPATIENT
Start: 2020-11-16 | End: 2021-10-29

## 2020-11-16 RX ORDER — BUPROPION HYDROCHLORIDE 150 MG/1
150 TABLET ORAL DAILY
Qty: 90 TABLET | Refills: 1 | Status: SHIPPED | OUTPATIENT
Start: 2020-11-16 | End: 2021-03-25

## 2020-11-16 RX ORDER — ATORVASTATIN CALCIUM 40 MG/1
40 TABLET, FILM COATED ORAL DAILY
Qty: 90 TABLET | Refills: 1 | Status: SHIPPED | OUTPATIENT
Start: 2020-11-16 | End: 2021-03-25

## 2020-11-16 NOTE — PROGRESS NOTES
Subjective   Darshan Bhatt is a 75 y.o. male.   Chief Complaint   Patient presents with   • Hyperlipidemia   • Hyperglycemia   • Anxiety   • Hypothyroidism   • B12 deficiency       History of Present Illness     1.  Hyperlipidemia-patient is on atorvastatin 40 mg a day.  He takes every day.  He has no muscle aches.  LDL 59, HDL 69, LFTs normal.  He walks briskly 5 days a week for 2 miles.     He has a history of coronary artery disease.  He had stents placed in 2007.  He has A. fib and he follow-up with cardiologist dr García.  He is on metoprolol 12.5 mg mg twice a day and Xarelto.    He does not smoke cigarettes.  He is a former smoker.  He quit at age of 33.  .  Family history is positive for stroke in his mother.     2.  Depression with anxiety-for years.  Patient is on Wellbutrin  mg a day.    His mood is normal most of the time.   No excessive worries. No suicidal ideation, no aggressive behaviors.      3.  Impaired fasting glucose-he gained 8 pounds from last office visit.  Fasting blood sugar is at 91, A1c at 5.8 from 5.9.    4.  Hypothyroidism-patient is post partial thyroidectomy was done years ago.  It was secondary to noncancerous condition.  Not otherwise specified.    He is on levothyroxine 125 mcg a day.  He takes it every day on empty stomach and does not eat for at least an hour.  TSH is at 3.12.     5.  B12 deficiency-vitamin B12 is normal at 392.    6. CKD- GFR 33, creatinine 1.32.  Gradual decline.  He uses no NSAIDs.  He has no dysuria.    Parkinson disease followed by neurologist.    The following portions of the patient's history were reviewed and updated as appropriate: allergies, current medications, past medical history, past social history and problem list.    Review of Systems   Constitutional: Negative for chills and fever.   Respiratory: Negative for cough and shortness of breath.    Cardiovascular: Negative for chest pain.   Psychiatric/Behavioral: Negative for suicidal ideas. The  patient is not nervous/anxious.          Objective   Wt Readings from Last 3 Encounters:   11/16/20 94.3 kg (208 lb)   09/25/20 90.7 kg (200 lb)   05/19/20 90.3 kg (199 lb)      Vitals:    11/16/20 1502   BP: 104/70   Pulse: 96   Temp: 98 °F (36.7 °C)   SpO2: 98%     Temp Readings from Last 3 Encounters:   11/16/20 98 °F (36.7 °C)   09/25/20 97.4 °F (36.3 °C) (Temporal)   05/19/20 97 °F (36.1 °C)     BP Readings from Last 3 Encounters:   11/16/20 104/70   09/25/20 141/89   05/19/20 104/70     Pulse Readings from Last 3 Encounters:   11/16/20 96   09/25/20 76   05/19/20 52     Body mass index is 28.2 kg/m².    Physical Exam  Constitutional:       Appearance: He is well-developed.   HENT:      Head: Normocephalic and atraumatic.   Neck:      Musculoskeletal: Neck supple.      Thyroid: No thyromegaly.      Vascular: No carotid bruit.   Cardiovascular:      Rate and Rhythm: Normal rate. Rhythm irregular.      Heart sounds: Normal heart sounds.   Pulmonary:      Effort: Pulmonary effort is normal.      Breath sounds: Normal breath sounds.   Skin:     General: Skin is warm and dry.   Neurological:      Mental Status: He is alert and oriented to person, place, and time.   Psychiatric:         Speech: Speech is delayed.         Behavior: Behavior normal.         Assessment/Plan   Diagnoses and all orders for this visit:    1. Mixed hyperlipidemia (Primary)    2. IFG (impaired fasting glucose)    3. Acquired hypothyroidism    4. Anxiety and depression    5. Vitamin B12 deficiency    6. Stage 3a chronic kidney disease    Other orders  -     levothyroxine (SYNTHROID, LEVOTHROID) 125 MCG tablet; Take 1 tablet by mouth Daily.  Dispense: 90 tablet; Refill: 3  -     buPROPion XL (WELLBUTRIN XL) 150 MG 24 hr tablet; Take 1 tablet by mouth Daily.  Dispense: 90 tablet; Refill: 1  -     atorvastatin (LIPITOR) 40 MG tablet; Take 1 tablet by mouth Daily.  Dispense: 90 tablet; Refill: 1        #1 hyperlipidemia-continue current  treatment.  Follow-up in 6 months.  2.  Impaired fasting glucose-weight loss can decrease risk of developing diabetes.  Patient will work on decreasing sweets.  Follow-up in 6 months.  3.  Depression anxiety-continue same.  Follow-up in 6 months.  4.  Chronic kidney disease-avoid NSAIDs.  Increase fluids.  Follow-up in 6 months.  5.  Hypothyroidism-continue same.  Follow-up in 12 months.  6.  B12 deficiency-continue same.  Follow-up in 12 months.

## 2021-03-04 DIAGNOSIS — Z23 IMMUNIZATION DUE: ICD-10-CM

## 2021-03-25 RX ORDER — BUPROPION HYDROCHLORIDE 150 MG/1
TABLET ORAL
Qty: 90 TABLET | Refills: 0 | Status: SHIPPED | OUTPATIENT
Start: 2021-03-25 | End: 2021-05-25 | Stop reason: SDUPTHER

## 2021-03-25 RX ORDER — ATORVASTATIN CALCIUM 40 MG/1
TABLET, FILM COATED ORAL
Qty: 90 TABLET | Refills: 0 | Status: SHIPPED | OUTPATIENT
Start: 2021-03-25 | End: 2021-05-25 | Stop reason: SDUPTHER

## 2021-05-17 DIAGNOSIS — R73.01 IFG (IMPAIRED FASTING GLUCOSE): Primary | ICD-10-CM

## 2021-05-25 ENCOUNTER — OFFICE VISIT (OUTPATIENT)
Dept: INTERNAL MEDICINE | Facility: CLINIC | Age: 76
End: 2021-05-25

## 2021-05-25 VITALS
OXYGEN SATURATION: 100 % | HEIGHT: 72 IN | TEMPERATURE: 97.1 F | HEART RATE: 99 BPM | BODY MASS INDEX: 28.17 KG/M2 | SYSTOLIC BLOOD PRESSURE: 130 MMHG | DIASTOLIC BLOOD PRESSURE: 80 MMHG | WEIGHT: 208 LBS

## 2021-05-25 DIAGNOSIS — G47.33 OBSTRUCTIVE SLEEP APNEA SYNDROME: ICD-10-CM

## 2021-05-25 DIAGNOSIS — R73.01 IFG (IMPAIRED FASTING GLUCOSE): ICD-10-CM

## 2021-05-25 DIAGNOSIS — F32.A ANXIETY AND DEPRESSION: ICD-10-CM

## 2021-05-25 DIAGNOSIS — N18.31 STAGE 3A CHRONIC KIDNEY DISEASE (HCC): ICD-10-CM

## 2021-05-25 DIAGNOSIS — Z00.00 MEDICARE ANNUAL WELLNESS VISIT, SUBSEQUENT: ICD-10-CM

## 2021-05-25 DIAGNOSIS — F41.9 ANXIETY AND DEPRESSION: ICD-10-CM

## 2021-05-25 DIAGNOSIS — J30.89 NON-SEASONAL ALLERGIC RHINITIS, UNSPECIFIED TRIGGER: ICD-10-CM

## 2021-05-25 DIAGNOSIS — E78.2 MIXED HYPERLIPIDEMIA: Primary | ICD-10-CM

## 2021-05-25 PROCEDURE — 96160 PT-FOCUSED HLTH RISK ASSMT: CPT | Performed by: FAMILY MEDICINE

## 2021-05-25 PROCEDURE — 1170F FXNL STATUS ASSESSED: CPT | Performed by: FAMILY MEDICINE

## 2021-05-25 PROCEDURE — 1160F RVW MEDS BY RX/DR IN RCRD: CPT | Performed by: FAMILY MEDICINE

## 2021-05-25 PROCEDURE — 99214 OFFICE O/P EST MOD 30 MIN: CPT | Performed by: FAMILY MEDICINE

## 2021-05-25 PROCEDURE — G0439 PPPS, SUBSEQ VISIT: HCPCS | Performed by: FAMILY MEDICINE

## 2021-05-25 PROCEDURE — 1126F AMNT PAIN NOTED NONE PRSNT: CPT | Performed by: FAMILY MEDICINE

## 2021-05-25 RX ORDER — ATORVASTATIN CALCIUM 40 MG/1
40 TABLET, FILM COATED ORAL DAILY
Qty: 90 TABLET | Refills: 1 | Status: SHIPPED | OUTPATIENT
Start: 2021-05-25 | End: 2021-10-18

## 2021-05-25 RX ORDER — BUPROPION HYDROCHLORIDE 150 MG/1
150 TABLET ORAL DAILY
Qty: 90 TABLET | Refills: 1 | Status: SHIPPED | OUTPATIENT
Start: 2021-05-25 | End: 2021-10-14

## 2021-05-25 RX ORDER — FLUTICASONE PROPIONATE 50 MCG
2 SPRAY, SUSPENSION (ML) NASAL 2 TIMES DAILY
Qty: 48 G | Refills: 1 | Status: SHIPPED | OUTPATIENT
Start: 2021-05-25 | End: 2021-10-18

## 2021-05-25 NOTE — PROGRESS NOTES
The ABCs of the Annual Wellness Visit  Subsequent Medicare Wellness Visit    Chief Complaint   Patient presents with   • Hyperlipidemia   • Hyperglycemia   • Medicare Wellness-subsequent   • Depression   • Chronic Kidney Disease   • Allergic Rhinitis       Subjective   History of Present Illness:  Darshan Bhatt is a 75 y.o. male who presents for a Subsequent Medicare Wellness Visit.    HEALTH RISK ASSESSMENT    Recent Hospitalizations:  No hospitalization(s) within the last year.    Current Medical Providers:  Patient Care Team:  Blanca Hooper MD as PCP - General (Family Medicine)  Linwood Cox MD as Consulting Physician (Urology)  Darshan Cox MD as Consulting Physician (Dermatology)    Smoking Status:  Social History     Tobacco Use   Smoking Status Former Smoker   Smokeless Tobacco Former User       Alcohol Consumption:  Social History     Substance and Sexual Activity   Alcohol Use Yes    Comment: 2-3 / week  beer or bourbon       Depression Screen:   PHQ-2/PHQ-9 Depression Screening 5/25/2021   Little interest or pleasure in doing things 0   Feeling down, depressed, or hopeless 0   Trouble falling or staying asleep, or sleeping too much 0   Feeling tired or having little energy 0   Poor appetite or overeating 0   Feeling bad about yourself - or that you are a failure or have let yourself or your family down 0   Trouble concentrating on things, such as reading the newspaper or watching television 0   Moving or speaking so slowly that other people could have noticed. Or the opposite - being so fidgety or restless that you have been moving around a lot more than usual 0   Thoughts that you would be better off dead, or of hurting yourself in some way 0   Total Score 0   If you checked off any problems, how difficult have these problems made it for you to do your work, take care of things at home, or get along with other people? Not difficult at all       Fall Risk Screen:  STEADI Fall Risk Assessment  was completed, and patient is at MODERATE risk for falls. Assessment completed on:5/25/2021    Health Habits and Functional and Cognitive Screening:  Functional & Cognitive Status 5/25/2021   Do you have difficulty preparing food and eating? No   Do you have difficulty bathing yourself, getting dressed or grooming yourself? No   Do you have difficulty using the toilet? No   Do you have difficulty moving around from place to place? No   Do you have trouble with steps or getting out of a bed or a chair? No   Current Diet Well Balanced Diet   Dental Exam Up to date   Eye Exam Up to date   Exercise (times per week) 4 times per week   Current Exercises Include Walking   Current Exercise Activities Include -   Do you need help using the phone?  No   Are you deaf or do you have serious difficulty hearing?  No   Do you need help with transportation? No   Do you need help shopping? No   Do you need help preparing meals?  No   Do you need help with housework?  No   Do you need help with laundry? No   Do you need help taking your medications? No   Do you need help managing money? No   Do you ever drive or ride in a car without wearing a seat belt? No   Have you felt unusual stress, anger or loneliness in the last month? No   Who do you live with? Alone   If you need help, do you have trouble finding someone available to you? No   Have you been bothered in the last four weeks by sexual problems? No   Do you have difficulty concentrating, remembering or making decisions? Yes         Does the patient have evidence of cognitive impairment? No    Asprin use counseling:Taking ASA appropriately as indicated   CAD.    Age-appropriate Screening Schedule:  Refer to the list below for future screening recommendations based on patient's age, sex and/or medical conditions. Orders for these recommended tests are listed in the plan section. The patient has been provided with a written plan.    Health Maintenance   Topic Date Due   •  INFLUENZA VACCINE  08/01/2021   • LIPID PANEL  11/11/2021   • TDAP/TD VACCINES (3 - Td or Tdap) 09/25/2028   • ZOSTER VACCINE  Completed          The following portions of the patient's history were reviewed and updated as appropriate: allergies, current medications, past family history, past medical history, past social history, past surgical history and problem list.    Outpatient Medications Prior to Visit   Medication Sig Dispense Refill   • aspirin 81 MG tablet Take 81 mg by mouth.     • carbidopa-levodopa (SINEMET)  MG per tablet Take 1.5 tablets by mouth 4 (Four) Times a Day.     • cetirizine (zyrTEC) 10 MG tablet Take 10 mg by mouth Daily.     • clotrimazole-betamethasone (LOTRISONE) 1-0.05 % cream Apply  topically to the appropriate area as directed 2 (Two) Times a Day. 45 g 0   • levothyroxine (SYNTHROID, LEVOTHROID) 125 MCG tablet Take 1 tablet by mouth Daily. 90 tablet 3   • metoprolol tartrate (LOPRESSOR) 25 MG tablet      • mupirocin (BACTROBAN) 2 % cream Apply  topically to the appropriate area as directed 2 (Two) Times a Day. 30 g 0   • nitroglycerin (NITROSTAT) 0.3 MG SL tablet Place  under the tongue.     • pantoprazole (PROTONIX) 40 MG EC tablet Take 40 mg by mouth Daily.     • rivaroxaban (Xarelto) 20 MG tablet TAKE 1 TABLET EVERY DAY     • tamsulosin (FLOMAX) 0.4 MG capsule 24 hr capsule Take 1 capsule by mouth Daily.     • atorvastatin (LIPITOR) 40 MG tablet TAKE 1 TABLET EVERY DAY 90 tablet 0   • buPROPion XL (WELLBUTRIN XL) 150 MG 24 hr tablet TAKE 1 TABLET EVERY DAY 90 tablet 0   • fluticasone (FLONASE) 50 MCG/ACT nasal spray 2 sprays into the nostril(s) as directed by provider 2 (Two) Times a Day.       No facility-administered medications prior to visit.       Patient Active Problem List   Diagnosis   • PAF (paroxysmal atrial fibrillation) (CMS/HCC)   • Anxiety   • Coronary arteriosclerosis in native artery   • Benign prostatic hyperplasia   • Depression   • Dizziness   • Hemorrhoids  "  • Hyperlipidemia   • Hypothyroidism   • Malignant melanoma of skin (CMS/HCC)   • Sleep apnea   • Vitamin B12 deficiency   • Angiodysplasia of colon without bleeding   • Chronic anticoagulation   • IFG (impaired fasting glucose)   • Orthostatic hypotension   • Parkinson disease (CMS/HCC)   • BPH with urinary obstruction   • Urinary retention       Advanced Care Planning:  ACP discussion was held with the patient during this visit. Patient has an advance directive (not in EMR), copy requested.    Review of Systems    Compared to one year ago, the patient feels his physical health is better.  Compared to one year ago, the patient feels his mental health is better.    Reviewed chart for potential of high risk medication in the elderly: yes  Reviewed chart for potential of harmful drug interactions in the elderly:yes    Objective         Vitals:    05/25/21 1030   BP: 130/80   Pulse: 99   Temp: 97.1 °F (36.2 °C)   SpO2: 100%   Weight: 94.3 kg (208 lb)   Height: 182.9 cm (72.01\")   PainSc: 0-No pain       Body mass index is 28.2 kg/m².  Discussed the patient's BMI with him. The BMI is above average; BMI management plan is completed.    Physical Exam          Assessment/Plan   Medicare Risks and Personalized Health Plan  CMS Preventative Services Quick Reference  Advance Directive Discussion  Cardiovascular risk  Fall Risk  Immunizations Discussed/Encouraged (specific immunizations; Shingrix )  Obesity/Overweight   Urinary Incontinence     Patient has living will and is going to bring it to next office visit.  He confirms that his wife Brittany is his healthcare surrogate.  He is at risk for falls.  Information on fall prevention provided.  He walks using 2 canes.  He is advised to start again art chi and yoga to help with balance.  She is advised to get Shingrix at the pharmacy.  Continue regular exercise at least 30 minutes a day, 5 days a week.  He is advised to use Silver sneakers.  He follows up with urologist on " urinary incontinence.  He has suprapubic catheter.  Continue regular dental appointments and eye exams.    The above risks/problems have been discussed with the patient.  Pertinent information has been shared with the patient in the After Visit Summary.  Follow up plans and orders are seen below in the Assessment/Plan Section.    Diagnoses and all orders for this visit:    1. Mixed hyperlipidemia (Primary)    2. IFG (impaired fasting glucose)    3. Anxiety and depression    4. Stage 3a chronic kidney disease (CMS/HCC)    5. Obstructive sleep apnea syndrome  -     Ambulatory Referral to Sleep Medicine    Other orders  -     buPROPion XL (WELLBUTRIN XL) 150 MG 24 hr tablet; Take 1 tablet by mouth Daily.  Dispense: 90 tablet; Refill: 1  -     atorvastatin (LIPITOR) 40 MG tablet; Take 1 tablet by mouth Daily.  Dispense: 90 tablet; Refill: 1  -     fluticasone (FLONASE) 50 MCG/ACT nasal spray; 2 sprays into the nostril(s) as directed by provider 2 (Two) Times a Day.  Dispense: 48 g; Refill: 1      Follow Up:  Return in about 6 months (around 11/25/2021).     An After Visit Summary and PPPS were given to the patient.

## 2021-05-25 NOTE — PATIENT INSTRUCTIONS
Calorie Counting for Weight Loss  Calories are units of energy. Your body needs a certain amount of calories from food to keep you going throughout the day. When you eat more calories than your body needs, your body stores the extra calories as fat. When you eat fewer calories than your body needs, your body burns fat to get the energy it needs.  Calorie counting means keeping track of how many calories you eat and drink each day. Calorie counting can be helpful if you need to lose weight. If you make sure to eat fewer calories than your body needs, you should lose weight. Ask your health care provider what a healthy weight is for you.  For calorie counting to work, you will need to eat the right number of calories in a day in order to lose a healthy amount of weight per week. A dietitian can help you determine how many calories you need in a day and will give you suggestions on how to reach your calorie goal.  · A healthy amount of weight to lose per week is usually 1-2 lb (0.5-0.9 kg). This usually means that your daily calorie intake should be reduced by 500-750 calories.  · Eating 1,200 - 1,500 calories per day can help most women lose weight.  · Eating 1,500 - 1,800 calories per day can help most men lose weight.  What is my plan?  My goal is to have __________ calories per day.  If I have this many calories per day, I should lose around __________ pounds per week.  What do I need to know about calorie counting?  In order to meet your daily calorie goal, you will need to:  · Find out how many calories are in each food you would like to eat. Try to do this before you eat.  · Decide how much of the food you plan to eat.  · Write down what you ate and how many calories it had. Doing this is called keeping a food log.  To successfully lose weight, it is important to balance calorie counting with a healthy lifestyle that includes regular activity. Aim for 150 minutes of moderate exercise (such as walking) or 75  minutes of vigorous exercise (such as running) each week.  Where do I find calorie information?    The number of calories in a food can be found on a Nutrition Facts label. If a food does not have a Nutrition Facts label, try to look up the calories online or ask your dietitian for help.  Remember that calories are listed per serving. If you choose to have more than one serving of a food, you will have to multiply the calories per serving by the amount of servings you plan to eat. For example, the label on a package of bread might say that a serving size is 1 slice and that there are 90 calories in a serving. If you eat 1 slice, you will have eaten 90 calories. If you eat 2 slices, you will have eaten 180 calories.  How do I keep a food log?  Immediately after each meal, record the following information in your food log:  · What you ate. Don't forget to include toppings, sauces, and other extras on the food.  · How much you ate. This can be measured in cups, ounces, or number of items.  · How many calories each food and drink had.  · The total number of calories in the meal.  Keep your food log near you, such as in a small notebook in your pocket, or use a mobile ellen or website. Some programs will calculate calories for you and show you how many calories you have left for the day to meet your goal.  What are some calorie counting tips?    · Use your calories on foods and drinks that will fill you up and not leave you hungry:  ? Some examples of foods that fill you up are nuts and nut butters, vegetables, lean proteins, and high-fiber foods like whole grains. High-fiber foods are foods with more than 5 g fiber per serving.  ? Drinks such as sodas, specialty coffee drinks, alcohol, and juices have a lot of calories, yet do not fill you up.  · Eat nutritious foods and avoid empty calories. Empty calories are calories you get from foods or beverages that do not have many vitamins or protein, such as candy, sweets, and  "soda. It is better to have a nutritious high-calorie food (such as an avocado) than a food with few nutrients (such as a bag of chips).  · Know how many calories are in the foods you eat most often. This will help you calculate calorie counts faster.  · Pay attention to calories in drinks. Low-calorie drinks include water and unsweetened drinks.  · Pay attention to nutrition labels for \"low fat\" or \"fat free\" foods. These foods sometimes have the same amount of calories or more calories than the full fat versions. They also often have added sugar, starch, or salt, to make up for flavor that was removed with the fat.  · Find a way of tracking calories that works for you. Get creative. Try different apps or programs if writing down calories does not work for you.  What are some portion control tips?  · Know how many calories are in a serving. This will help you know how many servings of a certain food you can have.  · Use a measuring cup to measure serving sizes. You could also try weighing out portions on a kitchen scale. With time, you will be able to estimate serving sizes for some foods.  · Take some time to put servings of different foods on your favorite plates, bowls, and cups so you know what a serving looks like.  · Try not to eat straight from a bag or box. Doing this can lead to overeating. Put the amount you would like to eat in a cup or on a plate to make sure you are eating the right portion.  · Use smaller plates, glasses, and bowls to prevent overeating.  · Try not to multitask (for example, watch TV or use your computer) while eating. If it is time to eat, sit down at a table and enjoy your food. This will help you to know when you are full. It will also help you to be aware of what you are eating and how much you are eating.  What are tips for following this plan?  Reading food labels  · Check the calorie count compared to the serving size. The serving size may be smaller than what you are used to " "eating.  · Check the source of the calories. Make sure the food you are eating is high in vitamins and protein and low in saturated and trans fats.  Shopping  · Read nutrition labels while you shop. This will help you make healthy decisions before you decide to purchase your food.  · Make a grocery list and stick to it.  Cooking  · Try to cook your favorite foods in a healthier way. For example, try baking instead of frying.  · Use low-fat dairy products.  Meal planning  · Use more fruits and vegetables. Half of your plate should be fruits and vegetables.  · Include lean proteins like poultry and fish.  How do I count calories when eating out?  · Ask for smaller portion sizes.  · Consider sharing an entree and sides instead of getting your own entree.  · If you get your own entree, eat only half. Ask for a box at the beginning of your meal and put the rest of your entree in it so you are not tempted to eat it.  · If calories are listed on the menu, choose the lower calorie options.  · Choose dishes that include vegetables, fruits, whole grains, low-fat dairy products, and lean protein.  · Choose items that are boiled, broiled, grilled, or steamed. Stay away from items that are buttered, battered, fried, or served with cream sauce. Items labeled \"crispy\" are usually fried, unless stated otherwise.  · Choose water, low-fat milk, unsweetened iced tea, or other drinks without added sugar. If you want an alcoholic beverage, choose a lower calorie option such as a glass of wine or light beer.  · Ask for dressings, sauces, and syrups on the side. These are usually high in calories, so you should limit the amount you eat.  · If you want a salad, choose a garden salad and ask for grilled meats. Avoid extra toppings like rose, cheese, or fried items. Ask for the dressing on the side, or ask for olive oil and vinegar or lemon to use as dressing.  · Estimate how many servings of a food you are given. For example, a serving of " cooked rice is ½ cup or about the size of half a baseball. Knowing serving sizes will help you be aware of how much food you are eating at restaurants. The list below tells you how big or small some common portion sizes are based on everyday objects:  ? 1 oz--4 stacked dice.  ? 3 oz--1 deck of cards.  ? 1 tsp--1 die.  ? 1 Tbsp--½ a ping-pong ball.  ? 2 Tbsp--1 ping-pong ball.  ? ½ cup--½ baseball.  ? 1 cup--1 baseball.  Summary  · Calorie counting means keeping track of how many calories you eat and drink each day. If you eat fewer calories than your body needs, you should lose weight.  · A healthy amount of weight to lose per week is usually 1-2 lb (0.5-0.9 kg). This usually means reducing your daily calorie intake by 500-750 calories.  · The number of calories in a food can be found on a Nutrition Facts label. If a food does not have a Nutrition Facts label, try to look up the calories online or ask your dietitian for help.  · Use your calories on foods and drinks that will fill you up, and not on foods and drinks that will leave you hungry.  · Use smaller plates, glasses, and bowls to prevent overeating.  This information is not intended to replace advice given to you by your health care provider. Make sure you discuss any questions you have with your health care provider.  Document Revised: 2019 Document Reviewed: 2017  [employer] Patient Education ©  [employer]    Medicare Wellness  Personal Prevention Plan of Service     Date of Office Visit:  2021  Encounter Provider:  Blanca Hooper MD  Place of Service:  [facility]  Patient Name: Darshan Bhatt  :  1945    As part of the Medicare Wellness portion of your visit today, we are providing you with this personalized preventive plan of services (PPPS). This plan is based upon recommendations of the [place] Preventive Services Task Force (USPSTF) and the Advisory Committee on Immunization Practices  (ACIP).    This lists the preventive care services that should be considered, and provides dates of when you are due. Items listed as completed are up-to-date and do not require any further intervention.    Health Maintenance   Topic Date Due   • ANNUAL WELLNESS VISIT  11/18/2020   • INFLUENZA VACCINE  08/01/2021   • LIPID PANEL  11/11/2021   • COLORECTAL CANCER SCREENING  11/05/2022   • TDAP/TD VACCINES (3 - Td or Tdap) 09/25/2028   • HEPATITIS C SCREENING  Completed   • COVID-19 Vaccine  Completed   • Pneumococcal Vaccine 65+  Completed   • ZOSTER VACCINE  Completed       Orders Placed This Encounter   Procedures   • Ambulatory Referral to Sleep Medicine     Referral Priority:   Routine     Referral Type:   Consultation     Referral Reason:   Specialty Services Required     Requested Specialty:   Sleep Medicine     Number of Visits Requested:   1       Return in about 6 months (around 11/25/2021).      Exercise Information for Aging Adults  Staying physically active is important as you age. The four types of exercises that are best for older adults are endurance, strength, balance, and flexibility. Contact your health care provider before you start any exercise routine. Ask your health care provider what activities are safe for you.  What are the risks?  Risks associated with exercising include:  · Overdoing it. This may lead to sore muscles or fatigue.  · Falls.  · Injuries.  · Dehydration.  How to do these exercises  Endurance exercises  Endurance (aerobic) exercises raise your breathing rate and heart rate. Increasing your endurance helps you to do everyday tasks and stay healthy. By improving the health of your body system that includes your heart, lungs, and blood vessels (circulatory system), you may also delay or prevent diseases such as heart disease, diabetes, and bone loss (osteoporosis). Types of endurance exercises include:  · Sports.  · Indoor activities, such as using gym equipment, doing water  aerobics, or dancing.  · Outdoor activities, such as biking or jogging.  · Tasks around the house, such as gardening, yard work, and heavy household chores like cleaning.  · Walking, such as hiking or walking around your neighborhood.  When doing endurance exercises, make sure you:  · Are aware of your surroundings.  · Use safety equipment as directed.  · Dress in layers when exercising outdoors.  · Drink plenty of water to stay well hydrated.  Build up endurance slowly. Start with 10 minutes at a time, and gradually build up to doing 30 minutes at a time. Unless your health care provider gave you different instructions, aim to exercise for a total of 150 minutes a week. Spread out that time so you are working on endurance on 3 or more days a week.  Strength exercises  Lifting, pulling, or pushing weights helps to strengthen muscles. Having stronger muscles makes it easier to do everyday activities, such as getting up from a chair, climbing stairs, carrying groceries, and playing with grandchildren. Strength exercises include arm and leg exercises that may be done:  · With weights.  · Without weights (using your own body weight).  · With a resistance band.  When doing strength exercises:  · Move smoothly and steadily. Do not suddenly thrust or jerk the weights, the resistance band, or your body.  · Start with no weights or with light weights, and gradually add more weight over time. Eventually, aim to use weights that are hard or very hard for you to lift. This means that you are able to do 8 repetitions with the weight, and the last few repetitions are very challenging.  · Lift or push weights into position for 3 seconds, hold the position for 1 second, and then take 3 seconds to return to your starting position.  · Breathe out (exhale) during difficult movements, like lifting or pushing weights. Breathe in (inhale) to relax your muscles before the next repetition.  · Consider alternating arms or legs, especially  "when you first start strength exercises.  · Expect some slight muscle soreness after each session.  Do strength exercises on 2 or more days a week, for 30 minutes at a time. Avoid exercising the same muscle groups two days in a row. For example, if you work on your leg muscles one day, work on your arm muscles the next day. When you can do two sets of 10-15 repetitions with a certain weight, increase the amount of weight.  Balance  Balance exercises can help to prevent falls. Balance exercises include:  · Standing on one foot.  · Heel-to-toe walk.  · Balance walk.  · David chi.  Make sure you have something sturdy to hold onto while doing balance exercises, such as a sturdy chair. As your balance improves, challenge yourself by holding onto the chair with one hand instead of two, and then with no hands. Trying exercises with your eyes closed also challenges your balance, but be sure to have a sturdy surface (like a countertop) close by in case you need it.  Do balance exercises as often as you want, or as often as directed by your health care provider. Strength exercises for the lower body also help to improve balance.  Flexibility    Flexibility exercises improve how far you can bend, straighten, move, or rotate parts of your body (range of motion). These exercises also help you to do everyday activities such as getting dressed or reaching for objects. Flexibility exercises include stretching different parts of the body, and they may be done in a standing or seated position or on the floor.  When stretching, make sure you:  · Keep a slight bend in your arms and legs. Avoid completely straightening (\"locking\") your joints.  · Do not stretch so far that you feel pain. You should feel a mild stretching feeling. You may try stretching farther as you become more flexible over time.  · Relax and breathe between stretches.  · Hold onto something sturdy for balance as needed.  Hold each stretch for 10-30 seconds. Repeat each " stretch 3-5 times.  General safety tips  · Exercise in well-lit areas.  · Do not hold your breath during exercises or stretches.  · Warm up before exercising, and cool down after exercising. This can help prevent injury.  · Drink plenty of water during exercise or any activity that makes you sweat.  · Use smooth, steady movements. Do not use sudden, jerking movements, especially when lifting weights or doing flexibility exercises.  · If you are not sure if an exercise is safe for you, or you are not sure how to do an exercise, talk with your health care provider. This is especially important if you have had surgery on muscles, bones, or joints (orthopedic surgery).  Where to find more information  You can find more information about exercise for older adults from:  · Your local health department, fitness center, or community center. These facilities may have programs for aging adults.  · National [place] on Aging: www.brent.nih.gov  · National [place] on Aging: www.ncoa.org  Summary  · Staying physically active is important as you age.  · Make sure to contact your health care provider before you start any exercise routine. Ask your health care provider what activities are safe for you.  · Doing endurance, strength, balance, and flexibility exercises can help to delay or prevent certain diseases, such as heart disease, diabetes, and bone loss (osteoporosis).  This information is not intended to replace advice given to you by your health care provider. Make sure you discuss any questions you have with your health care provider.  Document Revised: 10/10/2019 Document Reviewed: 05/09/2018  [employer] Patient Education © 2021 [employer]  Fall Prevention in the Home, Adult  Falls can cause injuries. They can happen to people of all ages. There are many things you can do to make your home safe and to help prevent falls. Ask for help when making these changes, if needed.  What actions can I take to prevent falls?  General  Instructions  · Use good lighting in all rooms. Replace any light bulbs that burn out.  · Turn on the lights when you go into a dark area. Use night-lights.  · Keep items that you use often in easy-to-reach places. Lower the shelves around your home if necessary.  · Set up your furniture so you have a clear path. Avoid moving your furniture around.  · Do not have throw rugs and other things on the floor that can make you trip.  · Avoid walking on wet floors.  · If any of your floors are uneven, fix them.  · Add color or contrast paint or tape to clearly joce and help you see:  ? Any grab bars or handrails.  ? First and last steps of stairways.  ? Where the edge of each step is.  · If you use a stepladder:  ? Make sure that it is fully opened. Do not climb a closed stepladder.  ? Make sure that both sides of the stepladder are locked into place.  ? Ask someone to hold the stepladder for you while you use it.  · If there are any pets around you, be aware of where they are.  What can I do in the bathroom?         · Keep the floor dry. Clean up any water that spills onto the floor as soon as it happens.  · Remove soap buildup in the tub or shower regularly.  · Use non-skid mats or decals on the floor of the tub or shower.  · Attach bath mats securely with double-sided, non-slip rug tape.  · If you need to sit down in the shower, use a plastic, non-slip stool.  · Install grab bars by the toilet and in the tub and shower. Do not use towel bars as grab bars.  What can I do in the bedroom?  · Make sure that you have a light by your bed that is easy to reach.  · Do not use any sheets or blankets that are too big for your bed. They should not hang down onto the floor.  · Have a firm chair that has side arms. You can use this for support while you get dressed.  What can I do in the kitchen?  · Clean up any spills right away.  · If you need to reach something above you, use a strong step stool that has a grab bar.  · Keep  electrical cords out of the way.  · Do not use floor polish or wax that makes floors slippery. If you must use wax, use non-skid floor wax.  What can I do with my stairs?  · Do not leave any items on the stairs.  · Make sure that you have a light switch at the top of the stairs and the bottom of the stairs. If you do not have them, ask someone to add them for you.  · Make sure that there are handrails on both sides of the stairs, and use them. Fix handrails that are broken or loose. Make sure that handrails are as long as the stairways.  · Install non-slip stair treads on all stairs in your home.  · Avoid having throw rugs at the top or bottom of the stairs. If you do have throw rugs, attach them to the floor with carpet tape.  · Choose a carpet that does not hide the edge of the steps on the stairway.  · Check any carpeting to make sure that it is firmly attached to the stairs. Fix any carpet that is loose or worn.  What can I do on the outside of my home?  · Use bright outdoor lighting.  · Regularly fix the edges of walkways and driveways and fix any cracks.  · Remove anything that might make you trip as you walk through a door, such as a raised step or threshold.  · Trim any bushes or trees on the path to your home.  · Regularly check to see if handrails are loose or broken. Make sure that both sides of any steps have handrails.  · Install guardrails along the edges of any raised decks and porches.  · Clear walking paths of anything that might make someone trip, such as tools or rocks.  · Have any leaves, snow, or ice cleared regularly.  · Use sand or salt on walking paths during winter.  · Clean up any spills in your garage right away. This includes grease or oil spills.  What other actions can I take?  · Wear shoes that:  ? Have a low heel. Do not wear high heels.  ? Have rubber bottoms.  ? Are comfortable and fit you well.  ? Are closed at the toe. Do not wear open-toe sandals.  · Use tools that help you move  around (mobility aids) if they are needed. These include:  ? Canes.  ? Walkers.  ? Scooters.  ? Crutches.  · Review your medicines with your doctor. Some medicines can make you feel dizzy. This can increase your chance of falling.  Ask your doctor what other things you can do to help prevent falls.  Where to find more information  · Centers for Disease Control and Prevention, [place]: https://cdc.gov  · National [place] on Aging: https://hh9dpzh.brent.nih.gov  Contact a doctor if:  · You are afraid of falling at home.  · You feel weak, drowsy, or dizzy at home.  · You fall at home.  Summary  · There are many simple things that you can do to make your home safe and to help prevent falls.  · Ways to make your home safe include removing tripping hazards and installing grab bars in the bathroom.  · Ask for help when making these changes in your home.  This information is not intended to replace advice given to you by your health care provider. Make sure you discuss any questions you have with your health care provider.  Document Revised: 04/09/2020 Document Reviewed: 08/02/2018  [employer] Patient Education © 2021 [employer].

## 2021-05-25 NOTE — PROGRESS NOTES
Subjective   Darshan Bhatt is a 75 y.o. male.   Chief Complaint   Patient presents with   • Hyperlipidemia   • Hyperglycemia   • Medicare Wellness-subsequent   • Depression   • Chronic Kidney Disease   • Allergic Rhinitis   • Sleep Apnea       History of Present Illness      1.  Hyperlipidemia-patient is on atorvastatin 40 mg a day.  He takes every day.  He has no muscle aches.  He walks 5 days a week for 30 minutes.     He has a history of coronary artery disease.  He had stents placed in 2007.  He has A. fib and he follow-up with cardiologist dr García.  He is on metoprolol 12.5 mg mg twice a day and Xarelto.     He does not smoke cigarettes.  He is a former smoker.  He quit at age of 33.  .  Family history is positive for stroke in his mother.     2.  Depression with anxiety-for years.  Patient is on Wellbutrin  mg a day.    His mood is normal most of the time.   No excessive worries. No suicidal ideation, no aggressive behaviors.  PHQ 9 at 0.     3.  Impaired fasting glucose-weight is stable from last office visit.    BMI at 28.2.     4.  Sleep apnea-he has a history of sleep apnea.  He does not use CPAP.  He is requesting referral to sleep specialist.    5.  Allergic rhinitis-he uses Flonase every day.  It helps with congestion and runny nose.  He has no side effects.  No nosebleeds.    6.  Chronic kidney disease - he uses no NSAIDs.  He uses Tylenol if needed.    The following portions of the patient's history were reviewed and updated as appropriate: allergies, current medications, past family history, past medical history, past social history, past surgical history and problem list.    Review of Systems   Constitutional: Negative for chills and fever.   Respiratory: Negative for shortness of breath.    Cardiovascular: Negative for chest pain.   Genitourinary: Negative for hematuria.   Psychiatric/Behavioral: Negative for suicidal ideas.         Objective   Wt Readings from Last 3 Encounters:   05/25/21  94.3 kg (208 lb)   11/16/20 94.3 kg (208 lb)   09/25/20 90.7 kg (200 lb)      Vitals:    05/25/21 1030   BP: 130/80   Pulse: 99   Temp: 97.1 °F (36.2 °C)   SpO2: 100%     Temp Readings from Last 3 Encounters:   05/25/21 97.1 °F (36.2 °C)   11/16/20 98 °F (36.7 °C)   09/25/20 97.4 °F (36.3 °C) (Temporal)     BP Readings from Last 3 Encounters:   05/25/21 130/80   11/16/20 104/70   09/25/20 141/89     Pulse Readings from Last 3 Encounters:   05/25/21 99   11/16/20 96   09/25/20 76     Body mass index is 28.2 kg/m².    Physical Exam  Constitutional:       Appearance: He is well-developed.   HENT:      Head: Normocephalic and atraumatic.   Neck:      Thyroid: No thyromegaly.      Vascular: No carotid bruit.   Cardiovascular:      Rate and Rhythm: Normal rate.      Heart sounds: Normal heart sounds.      Comments: Irregularity irregular.  Pulmonary:      Effort: Pulmonary effort is normal.      Breath sounds: Normal breath sounds.   Musculoskeletal:      Cervical back: Neck supple.   Skin:     General: Skin is warm and dry.   Neurological:      Mental Status: He is alert and oriented to person, place, and time.   Psychiatric:         Behavior: Behavior normal.         Assessment/Plan   Diagnoses and all orders for this visit:    1. Mixed hyperlipidemia (Primary)    2. IFG (impaired fasting glucose)    3. Anxiety and depression    4. Stage 3a chronic kidney disease (CMS/HCC)    5. Obstructive sleep apnea syndrome  -     Ambulatory Referral to Sleep Medicine    6. Medicare annual wellness visit, subsequent    7. Non-seasonal allergic rhinitis, unspecified trigger    Other orders  -     buPROPion XL (WELLBUTRIN XL) 150 MG 24 hr tablet; Take 1 tablet by mouth Daily.  Dispense: 90 tablet; Refill: 1  -     atorvastatin (LIPITOR) 40 MG tablet; Take 1 tablet by mouth Daily.  Dispense: 90 tablet; Refill: 1  -     fluticasone (FLONASE) 50 MCG/ACT nasal spray; 2 sprays into the nostril(s) as directed by provider 2 (Two) Times a  Day.  Dispense: 48 g; Refill: 1        #1 hyperlipidemia-continue current treatment.  Follow-up in 6 months.  2.  Impaired fasting glucose-we are checking nonfasting labs today.  A1c today.  Follow-up in 6 months.  3.  Depression with anxiety-continue same.  Follow-up in 6 months.  4.  Chronic kidney disease-avoid NSAIDs.  Increase fluids.  Labs today.  Follow-up in 6 months.  5.  Sleep apnea-referral to sleep specialist.  6.  Allergic rhinitis-continue Flonase.  Follow-up in 12 months.

## 2021-05-26 DIAGNOSIS — E87.5 HYPERKALEMIA: Primary | ICD-10-CM

## 2021-06-08 ENCOUNTER — TELEPHONE (OUTPATIENT)
Dept: INTERNAL MEDICINE | Facility: CLINIC | Age: 76
End: 2021-06-08

## 2021-06-08 NOTE — TELEPHONE ENCOUNTER
PATIENT IS REQUESTING THE RECORDS FROM HIS VISIT WITH DR ESPINO ON 05/24/21 BE SENT TO DR FRIEDA DORAN AT [facility], [phone] - MAIN.

## 2021-10-14 RX ORDER — BUPROPION HYDROCHLORIDE 150 MG/1
TABLET ORAL
Qty: 90 TABLET | Refills: 1 | Status: SHIPPED | OUTPATIENT
Start: 2021-10-14 | End: 2022-03-15

## 2021-10-18 RX ORDER — FLUTICASONE PROPIONATE 50 MCG
SPRAY, SUSPENSION (ML) NASAL
Qty: 48 G | Refills: 1 | Status: SHIPPED | OUTPATIENT
Start: 2021-10-18 | End: 2021-11-09 | Stop reason: SDUPTHER

## 2021-10-18 RX ORDER — ATORVASTATIN CALCIUM 40 MG/1
TABLET, FILM COATED ORAL
Qty: 90 TABLET | Refills: 1 | Status: SHIPPED | OUTPATIENT
Start: 2021-10-18 | End: 2022-03-08

## 2021-10-29 RX ORDER — LEVOTHYROXINE SODIUM 0.12 MG/1
TABLET ORAL
Qty: 90 TABLET | Refills: 3 | Status: SHIPPED | OUTPATIENT
Start: 2021-10-29 | End: 2022-09-21

## 2021-11-09 RX ORDER — FLUTICASONE PROPIONATE 50 MCG
2 SPRAY, SUSPENSION (ML) NASAL DAILY
Qty: 48 G | Refills: 3 | Status: SHIPPED | OUTPATIENT
Start: 2021-11-09 | End: 2023-01-03 | Stop reason: SDUPTHER

## 2021-12-08 ENCOUNTER — TELEPHONE (OUTPATIENT)
Dept: INTERNAL MEDICINE | Facility: CLINIC | Age: 76
End: 2021-12-08

## 2021-12-08 NOTE — TELEPHONE ENCOUNTER
Caller: EDGAR HU    Relationship to patient: Brother/Sister    Best call back number: [phone]    Patient is needing: PATIENT'S SISTER IS CALLING TO SCHEDULE A HOSPITAL FOLLOW.  PATIENT WAS DISCHARGED FROM [facility] ON 12/07/21.  SHE STATES 12/3/21 OR 12/14/21 WOULD BE BEST DAYS.  THE FIRST AVAILABILITY THAT I WAS ABLE TO FIND IS 01/03/2022.      PLEASE ADVISE.

## 2021-12-08 NOTE — TELEPHONE ENCOUNTER
Dr Hooper,   Patient is in need of a hospital follow up. The phone Hub said next available is 1/3/22. Do you want to authorize a time to get him scheduled?

## 2021-12-13 ENCOUNTER — OFFICE VISIT (OUTPATIENT)
Dept: INTERNAL MEDICINE | Facility: CLINIC | Age: 76
End: 2021-12-13

## 2021-12-13 VITALS
HEART RATE: 79 BPM | TEMPERATURE: 97.1 F | HEIGHT: 72 IN | BODY MASS INDEX: 26.95 KG/M2 | OXYGEN SATURATION: 99 % | DIASTOLIC BLOOD PRESSURE: 72 MMHG | WEIGHT: 199 LBS | SYSTOLIC BLOOD PRESSURE: 110 MMHG

## 2021-12-13 DIAGNOSIS — R42 DIZZINESS: Primary | ICD-10-CM

## 2021-12-13 DIAGNOSIS — I95.1 ORTHOSTATIC HYPOTENSION: ICD-10-CM

## 2021-12-13 DIAGNOSIS — G20 PARKINSON DISEASE (HCC): ICD-10-CM

## 2021-12-13 DIAGNOSIS — R35.0 FREQUENCY OF URINATION: ICD-10-CM

## 2021-12-13 LAB
BILIRUB BLD-MCNC: NEGATIVE MG/DL
CLARITY, POC: CLEAR
COLOR UR: YELLOW
EXPIRATION DATE: ABNORMAL
GLUCOSE UR STRIP-MCNC: NEGATIVE MG/DL
KETONES UR QL: NEGATIVE
LEUKOCYTE EST, POC: ABNORMAL
Lab: ABNORMAL
NITRITE UR-MCNC: POSITIVE MG/ML
PH UR: 5.5 [PH] (ref 5–8)
PROT UR STRIP-MCNC: NEGATIVE MG/DL
RBC # UR STRIP: ABNORMAL /UL
SP GR UR: 1.02 (ref 1–1.03)
UROBILINOGEN UR QL: NORMAL

## 2021-12-13 PROCEDURE — 81003 URINALYSIS AUTO W/O SCOPE: CPT | Performed by: FAMILY MEDICINE

## 2021-12-13 PROCEDURE — 99214 OFFICE O/P EST MOD 30 MIN: CPT | Performed by: FAMILY MEDICINE

## 2021-12-13 NOTE — PROGRESS NOTES
Subjective   Darshan Bhatt is a 76 y.o. male.   Chief Complaint   Patient presents with   • Hospital Follow Up Visit     Dizziness, urinary frequency, orthostatic hypotension       History of Present Illness     Dizziness/Parkinson disease/urinary frequency/orthostatic hypotension -patient was admitted to hospital for dizziness. It happened when he was in speech therapy. He had dizziness and felt that he might fall or pass out. His blood pressure was elevated which is unusual for him- he has Parkinson disease. Head CT was negative for acute hemorrhage and large territory infarct. Head and neck CTA negative for significant stenosis. Brain MRI was negative for acute changes. He was evaluated by neurologist and no additional work-up was recommended.  He complained of urinary frequency. Shortly prior to admission to hospital he completed treatment with Bactrim and Augmentin. It was prescribed by his urologist. Culture in hospital was positive for Klebsiella and no further treatment was recommended as it was felt to be colonization from catheter.  No change in medications were made in hospital.  Patient is here today with his sister. He reports that he is scheduled with his neurologist on December 23. He has no other symptoms than urinary frequency. He has no burning sensation with urination, no other symptoms associated.    The following portions of the patient's history were reviewed and updated as appropriate: allergies, current medications, past family history, past medical history, past social history, past surgical history and problem list.    Review of Systems   Constitutional: Negative for chills and fever.   Respiratory: Negative.    Cardiovascular: Negative.    Genitourinary: Positive for frequency. Negative for dysuria.         Objective   Wt Readings from Last 3 Encounters:   12/13/21 90.3 kg (199 lb)   11/19/21 88.7 kg (195 lb 8 oz)   05/28/21 93 kg (205 lb)      Vitals:    12/13/21 0821   BP: 110/72   Pulse:  79   Temp: 97.1 °F (36.2 °C)   SpO2: 99%     Temp Readings from Last 3 Encounters:   12/13/21 97.1 °F (36.2 °C)   11/19/21 98.3 °F (36.8 °C) (Temporal)   10/05/21 96.9 °F (36.1 °C)     BP Readings from Last 3 Encounters:   12/13/21 110/72   11/19/21 150/99   10/05/21 130/87     Pulse Readings from Last 3 Encounters:   12/13/21 79   11/19/21 83   10/05/21 90     Body mass index is 26.98 kg/m².    Physical Exam  Constitutional:       Appearance: He is well-developed.   HENT:      Head: Normocephalic and atraumatic.   Neck:      Thyroid: No thyromegaly.      Vascular: No carotid bruit.   Cardiovascular:      Rate and Rhythm: Normal rate and regular rhythm.      Heart sounds: Normal heart sounds.   Pulmonary:      Effort: Pulmonary effort is normal.      Breath sounds: Normal breath sounds.   Musculoskeletal:      Cervical back: Neck supple.   Skin:     General: Skin is warm and dry.   Neurological:      Mental Status: He is alert and oriented to person, place, and time.      Comments: Shuffling walk.   Psychiatric:         Behavior: Behavior normal.         Assessment/Plan   Diagnoses and all orders for this visit:    1. Dizziness (Primary)    2. Frequency of urination  -     POCT urinalysis dipstick, automated  -     Urine Culture - Urine, Urine, Clean Catch    3. Orthostatic hypotension    4. Parkinson disease (HCC)        #1 dizziness/#2Parkinson disease/#3urinary frequency/#orthostatic hypotension -Hospital records were reviewed. Medications are updated. Blood work results and imaging test results were reviewed. We are checking urine culture. Patient is advised to schedule follow-up with his urologist. He is scheduled for follow-up on Parkinson's disease with his neurologist in 10 days.

## 2021-12-16 LAB
BACTERIA UR CULT: ABNORMAL
BACTERIA UR CULT: ABNORMAL
OTHER ANTIBIOTIC SUSC ISLT: ABNORMAL

## 2022-01-07 ENCOUNTER — TRANSCRIBE ORDERS (OUTPATIENT)
Dept: HOME HEALTH SERVICES | Facility: HOME HEALTHCARE | Age: 77
End: 2022-01-07

## 2022-01-07 ENCOUNTER — HOME HEALTH ADMISSION (OUTPATIENT)
Dept: HOME HEALTH SERVICES | Facility: HOME HEALTHCARE | Age: 77
End: 2022-01-07

## 2022-01-07 DIAGNOSIS — R33.8 ACUTE URINARY RETENTION: Primary | ICD-10-CM

## 2022-03-08 RX ORDER — ATORVASTATIN CALCIUM 40 MG/1
TABLET, FILM COATED ORAL
Qty: 90 TABLET | Refills: 1 | Status: SHIPPED | OUTPATIENT
Start: 2022-03-08 | End: 2022-08-01

## 2022-03-15 RX ORDER — BUPROPION HYDROCHLORIDE 150 MG/1
TABLET ORAL
Qty: 90 TABLET | Refills: 1 | Status: SHIPPED | OUTPATIENT
Start: 2022-03-15 | End: 2022-06-27

## 2022-05-24 ENCOUNTER — OFFICE VISIT (OUTPATIENT)
Dept: INTERNAL MEDICINE | Facility: CLINIC | Age: 77
End: 2022-05-24

## 2022-05-24 VITALS
SYSTOLIC BLOOD PRESSURE: 96 MMHG | DIASTOLIC BLOOD PRESSURE: 60 MMHG | OXYGEN SATURATION: 100 % | BODY MASS INDEX: 24.92 KG/M2 | HEIGHT: 72 IN | HEART RATE: 83 BPM | TEMPERATURE: 97.3 F | WEIGHT: 184 LBS

## 2022-05-24 DIAGNOSIS — R73.01 IFG (IMPAIRED FASTING GLUCOSE): ICD-10-CM

## 2022-05-24 DIAGNOSIS — E78.2 MIXED HYPERLIPIDEMIA: ICD-10-CM

## 2022-05-24 DIAGNOSIS — Z00.00 ROUTINE HEALTH MAINTENANCE: Primary | ICD-10-CM

## 2022-05-24 PROCEDURE — 99213 OFFICE O/P EST LOW 20 MIN: CPT | Performed by: FAMILY MEDICINE

## 2022-05-24 RX ORDER — LOPERAMIDE HYDROCHLORIDE 2 MG/1
2 CAPSULE ORAL 4 TIMES DAILY PRN
COMMUNITY

## 2022-05-24 RX ORDER — VENLAFAXINE HYDROCHLORIDE 37.5 MG/1
37.5 CAPSULE, EXTENDED RELEASE ORAL DAILY
COMMUNITY
Start: 2022-05-23

## 2022-05-24 NOTE — PROGRESS NOTES
Subjective   Darshan Bhatt is a 76 y.o. male.   Chief Complaint   Patient presents with   • Parkinson's Disease       History of Present Illness     Parkinson's disease/hyperlipidemia/impaired fasting glucose- he is here with his brother.  He needs exam and paperwork done prior to admission to assisted living facility.  History is provided by patient and his brother.  No change in medical, surgical or family history from last office visit.  He was started on Effexor 37.5 mg yesterday.  It was started by his neurologist.  They are weaning off bupropion over 1 month period.  It was done because his mood was decreased.  Otherwise no change in medications, including over-the-counter medications.  He does not use tobacco products.  He walks with a cane.  He needs some help with administration of medications.  He tolerates regular diet.  No problems with choking.    The following portions of the patient's history were reviewed and updated as appropriate: allergies, current medications, past medical history, past social history and problem list.    Review of Systems   Constitutional: Negative for chills and fever.   Respiratory: Negative for cough and shortness of breath.    Cardiovascular: Negative for chest pain.   Psychiatric/Behavioral: Negative for suicidal ideas.         Objective   Wt Readings from Last 3 Encounters:   05/24/22 83.5 kg (184 lb)   12/13/21 90.3 kg (199 lb)   11/19/21 88.7 kg (195 lb 8 oz)      Vitals:    05/24/22 1106   BP: 96/60   Pulse: 83   Temp: 97.3 °F (36.3 °C)   SpO2: 100%     Temp Readings from Last 3 Encounters:   05/24/22 97.3 °F (36.3 °C)   12/13/21 97.1 °F (36.2 °C)   11/19/21 98.3 °F (36.8 °C) (Temporal)     BP Readings from Last 3 Encounters:   05/24/22 96/60   12/13/21 110/72   11/19/21 150/99     Pulse Readings from Last 3 Encounters:   05/24/22 83   12/13/21 79   11/19/21 83     Body mass index is 24.95 kg/m².    Physical Exam  Constitutional:       Appearance: He is well-developed.       Comments: Shuffling gait.   HENT:      Head: Normocephalic and atraumatic.   Neck:      Thyroid: No thyromegaly.      Vascular: No carotid bruit.   Cardiovascular:      Rate and Rhythm: Normal rate and regular rhythm.      Heart sounds: Normal heart sounds.   Pulmonary:      Effort: Pulmonary effort is normal.      Breath sounds: Normal breath sounds.   Musculoskeletal:      Cervical back: Neck supple.   Skin:     General: Skin is warm and dry.   Neurological:      Mental Status: He is alert and oriented to person, place, and time.      Comments: Slow speech.   Psychiatric:         Behavior: Behavior normal.         Assessment & Plan   Diagnoses and all orders for this visit:    1. Routine health maintenance (Primary)  -     QuantiFERON TB Gold    2. Mixed hyperlipidemia    3. IFG (impaired fasting glucose)        Parkinson disease/hyperlipidemia/hyperglycemia-history was provided by patient and his brother.  We are checking TB test.  We recommend that the medications will be administered by staff.  We will continue current medications and weaning off bupropion.  He will use walker/cane.  Healthy heart diet recommended.

## 2022-05-26 LAB
GAMMA INTERFERON BACKGROUND BLD IA-ACNC: 0.01 IU/ML
M TB IFN-G BLD-IMP: NEGATIVE
M TB IFN-G CD4+ BCKGRND COR BLD-ACNC: 0.01 IU/ML
M TB IFN-G CD4+CD8+ BCKGRND COR BLD-ACNC: 0.01 IU/ML
MITOGEN IGNF BLD-ACNC: >10 IU/ML
QUANTIFERON INCUBATION: NORMAL
SERVICE CMNT-IMP: NORMAL

## 2022-06-16 ENCOUNTER — TELEPHONE (OUTPATIENT)
Dept: INTERNAL MEDICINE | Facility: CLINIC | Age: 77
End: 2022-06-16

## 2022-06-16 NOTE — TELEPHONE ENCOUNTER
Caller: ROWENA    Relationship: Home Health    Best call back number: [phone]    What orders are you requesting (i.e. lab or imaging): VERBAL ORDERS FOR NURSING FOR CARE OF HIS CATHETER, PT, OT AND ST FOR PARKINSON'S SYMPTOMS.

## 2022-06-17 DIAGNOSIS — R73.01 IFG (IMPAIRED FASTING GLUCOSE): ICD-10-CM

## 2022-06-17 DIAGNOSIS — E03.9 ACQUIRED HYPOTHYROIDISM: ICD-10-CM

## 2022-06-17 DIAGNOSIS — E78.2 MIXED HYPERLIPIDEMIA: Primary | ICD-10-CM

## 2022-06-17 NOTE — TELEPHONE ENCOUNTER
Dr Hooper out of town . Ok's per covering provider. Will send orders for Dr Hooper to sign next week.   I gave them the name of urologist that manages his cath

## 2022-06-21 ENCOUNTER — TELEPHONE (OUTPATIENT)
Dept: INTERNAL MEDICINE | Facility: CLINIC | Age: 77
End: 2022-06-21

## 2022-06-21 LAB
ALBUMIN SERPL-MCNC: 4.1 G/DL (ref 3.7–4.7)
ALBUMIN/GLOB SERPL: 1.8 {RATIO} (ref 1.2–2.2)
ALP SERPL-CCNC: 61 IU/L (ref 44–121)
ALT SERPL-CCNC: 6 IU/L (ref 0–44)
AST SERPL-CCNC: 14 IU/L (ref 0–40)
BILIRUB SERPL-MCNC: 0.9 MG/DL (ref 0–1.2)
BUN SERPL-MCNC: 16 MG/DL (ref 8–27)
BUN/CREAT SERPL: 14 (ref 10–24)
CALCIUM SERPL-MCNC: 8.7 MG/DL (ref 8.6–10.2)
CHLORIDE SERPL-SCNC: 101 MMOL/L (ref 96–106)
CHOLEST SERPL-MCNC: 115 MG/DL (ref 100–199)
CO2 SERPL-SCNC: 28 MMOL/L (ref 20–29)
CREAT SERPL-MCNC: 1.13 MG/DL (ref 0.76–1.27)
EGFRCR SERPLBLD CKD-EPI 2021: 67 ML/MIN/1.73
ERYTHROCYTE [DISTWIDTH] IN BLOOD BY AUTOMATED COUNT: 12.4 % (ref 11.6–15.4)
GLOBULIN SER CALC-MCNC: 2.3 G/DL (ref 1.5–4.5)
GLUCOSE SERPL-MCNC: 86 MG/DL (ref 65–99)
HBA1C MFR BLD: 6 % (ref 4.8–5.6)
HCT VFR BLD AUTO: 37.3 % (ref 37.5–51)
HDLC SERPL-MCNC: 58 MG/DL
HGB BLD-MCNC: 12.4 G/DL (ref 13–17.7)
LDLC SERPL CALC-MCNC: 47 MG/DL (ref 0–99)
LDLC/HDLC SERPL: 0.8 RATIO (ref 0–3.6)
MCH RBC QN AUTO: 31.1 PG (ref 26.6–33)
MCHC RBC AUTO-ENTMCNC: 33.2 G/DL (ref 31.5–35.7)
MCV RBC AUTO: 94 FL (ref 79–97)
PLATELET # BLD AUTO: 150 X10E3/UL (ref 150–450)
POTASSIUM SERPL-SCNC: 4.5 MMOL/L (ref 3.5–5.2)
PROT SERPL-MCNC: 6.4 G/DL (ref 6–8.5)
RBC # BLD AUTO: 3.99 X10E6/UL (ref 4.14–5.8)
SODIUM SERPL-SCNC: 140 MMOL/L (ref 134–144)
TRIGL SERPL-MCNC: 36 MG/DL (ref 0–149)
TSH SERPL DL<=0.005 MIU/L-ACNC: 4.23 UIU/ML (ref 0.45–4.5)
VLDLC SERPL CALC-MCNC: 10 MG/DL (ref 5–40)
WBC # BLD AUTO: 4.2 X10E3/UL (ref 3.4–10.8)

## 2022-06-21 NOTE — TELEPHONE ENCOUNTER
Caller: MYAH    Relationship: Other    Best call back number:[phone]    What is the best time to reach you:ANYTIME BETWEEN 8AM AND 5PM    Who are you requesting to speak with (clinical staff, provider,  specific staff member):CLINICAL STAFF    Do you know the name of the person who called:MYAH WITH [facility]    What was the call regarding: MYAH CALLED WITH [facility] AND WANTED TO LET DR. ESPINO KNOW THE PATIENT START OF CARE WILL START 6/23/22 WITH HIS CATHTER CHANGE.     Do you require a callback: YES

## 2022-06-22 ENCOUNTER — TELEPHONE (OUTPATIENT)
Dept: INTERNAL MEDICINE | Facility: CLINIC | Age: 77
End: 2022-06-22

## 2022-06-22 NOTE — TELEPHONE ENCOUNTER
Caller: LEOPOLDO    Relationship: Other    Best call back number:  [phone]   LEOPOLDO WITH [facility] CALLED STATING THEY SENT A PHYSICIAN ORDER SHEET WITH A LIST OF THE PATIENTS MEDICATIONS ON IT TO BE SIGNED BY THE DOCTOR ON 6.13.22 AND THEY ARE WONDERING IF WE HAVE HAD A CHANCE TO COMPLETE THIS.    PLEASE CALL AND ADVISE.

## 2022-06-23 ENCOUNTER — TELEPHONE (OUTPATIENT)
Dept: INTERNAL MEDICINE | Facility: CLINIC | Age: 77
End: 2022-06-23

## 2022-06-23 NOTE — TELEPHONE ENCOUNTER
Caller: LEE ANN    Relationship: [facility]     Best call back number: [phone]     What orders are you requesting (i.e. lab or imaging):  VERBAL ORDERS TO TAKE ORDERS FROM UROLOGIST AND IF NURSING CAN SEE HIM FOR ONCE EVERY 4 WEEKS FOR CATHTER CHANGES .     In what timeframe would the patient need to come in: ASAP     Where will you receive your lab/imaging services:  HOME     Additional notes: N/A

## 2022-06-27 ENCOUNTER — OFFICE VISIT (OUTPATIENT)
Dept: INTERNAL MEDICINE | Facility: CLINIC | Age: 77
End: 2022-06-27

## 2022-06-27 VITALS
TEMPERATURE: 98.2 F | BODY MASS INDEX: 26.14 KG/M2 | DIASTOLIC BLOOD PRESSURE: 76 MMHG | HEIGHT: 72 IN | HEART RATE: 77 BPM | SYSTOLIC BLOOD PRESSURE: 136 MMHG | WEIGHT: 193 LBS | OXYGEN SATURATION: 96 %

## 2022-06-27 DIAGNOSIS — E03.9 ACQUIRED HYPOTHYROIDISM: ICD-10-CM

## 2022-06-27 DIAGNOSIS — E78.2 MIXED HYPERLIPIDEMIA: Primary | ICD-10-CM

## 2022-06-27 DIAGNOSIS — R73.01 IFG (IMPAIRED FASTING GLUCOSE): ICD-10-CM

## 2022-06-27 DIAGNOSIS — D64.9 ANEMIA, UNSPECIFIED TYPE: ICD-10-CM

## 2022-06-27 PROCEDURE — 99214 OFFICE O/P EST MOD 30 MIN: CPT | Performed by: FAMILY MEDICINE

## 2022-06-27 NOTE — PROGRESS NOTES
Subjective   Darshan Bhatt is a 76 y.o. male.   Chief Complaint   Patient presents with   • Hyperlipidemia   • IFG   • Hypothyroidism       History of Present Illness     1.  Hyperlipidemia-patient is on atorvastatin 40 mg a day.  He takes every day.  He has no muscle aches.  He walks with a cane.  He walks regularly.  Creatinine at 1.13, GFR 67.     He has a history of coronary artery disease.  He had stents placed in 2007.  He has A. fib and he follow-up with cardiologist dr García.  He is on metoprolol 12.5 mg mg twice a day and Xarelto.     He does not smoke cigarettes.  He is a former smoker.  He quit at age of 33.  .  Family history is positive for stroke in his mother.     2. Hypothyroidism-patient is post partial thyroidectomy was done years ago.  It was secondary to noncancerous condition.  Not otherwise specified.    He is on levothyroxine 125 mcg a day.  He takes it every day on empty stomach. TSH is at 4.23.     3.  Impaired fasting glucose- FBS 86, A1c 6.5 from 5.7    4.  Anemia-hemoglobin 12.4, hematocrit 37.3.  MCV 94.  No blood in stool, no blood in urine.  Sigmoidoscopy in 2017.    Depression anxiety-patient was weaned off bupropion and started on Effexor 37.5 mg a day by Dr. Navarro.  He reports significant improvement.    The following portions of the patient's history were reviewed and updated as appropriate: allergies, current medications, past family history, past medical history, past social history, past surgical history and problem list.    Review of Systems   Constitutional: Positive for fatigue.   Respiratory: Negative for shortness of breath.    Cardiovascular: Negative for chest pain.   Gastrointestinal: Negative for blood in stool.   Genitourinary: Negative for hematuria.         Objective   Wt Readings from Last 3 Encounters:   06/27/22 87.5 kg (193 lb)   05/24/22 83.5 kg (184 lb)   12/13/21 90.3 kg (199 lb)      Vitals:    06/27/22 1046   BP: 136/76   Pulse: 77   Temp: 98.2 °F (36.8  °C)   SpO2: 96%     Temp Readings from Last 3 Encounters:   06/27/22 98.2 °F (36.8 °C)   05/24/22 97.3 °F (36.3 °C)   12/13/21 97.1 °F (36.2 °C)     BP Readings from Last 3 Encounters:   06/27/22 136/76   05/24/22 96/60   12/13/21 110/72     Pulse Readings from Last 3 Encounters:   06/27/22 77   05/24/22 83   12/13/21 79     Body mass index is 26.17 kg/m².    Physical Exam  Constitutional:       Appearance: He is well-developed.      Comments: Shuffling gait.  Walks with a cane.   HENT:      Head: Normocephalic and atraumatic.   Neck:      Thyroid: No thyromegaly.      Vascular: No carotid bruit.   Cardiovascular:      Rate and Rhythm: Normal rate. Rhythm irregular.      Heart sounds: Normal heart sounds.   Pulmonary:      Effort: Pulmonary effort is normal.      Breath sounds: Normal breath sounds.   Musculoskeletal:      Cervical back: Neck supple.   Skin:     General: Skin is warm and dry.   Neurological:      Mental Status: He is alert and oriented to person, place, and time.   Psychiatric:         Behavior: Behavior normal.         Assessment & Plan   Diagnoses and all orders for this visit:    1. Mixed hyperlipidemia (Primary)    2. IFG (impaired fasting glucose)    3. Acquired hypothyroidism    4. Anemia, unspecified type  -     Vitamin B12 & Folate  -     Iron Profile  -     Ferritin  -     Cancel: Occult Blood, Fecal By Immunoassay - Stool, Per Rectum  -     Occult Blood, Fecal By Immunoassay - Stool, Per Rectum        1.  Hyperlipidemia-continue current treatment.  Follow-up in 6 months.  2.  Impaired fasting glucose- worse than last time.  Decrease carbs/sweets.  Follow-up in 6 months.  3.  Hypothyroidism-continue current treatment.  Follow-up in 12 months.  4.  Anemia-we are checking iron studies, B12 and folate.  FOBTIA.  Further recommendations depending on test results.

## 2022-06-28 NOTE — TELEPHONE ENCOUNTER
Caller: LEE ANN    Relationship: [facility]    Best call back number:     What is the best time to reach you:     Who are you requesting to speak with (clinical staff, provider,  specific staff member):    Do you know the name of the person who called:     What was the call regarding: LEE ANN IS CALLING IN TO CHECK ON THE STATUS OF HER REQUEST FOR ORDERS FOR THE PATIENT.  SHE WANTS TO BE CONTACTED ABOUT THIS.     Do you require a callback: YES

## 2022-06-30 ENCOUNTER — TELEPHONE (OUTPATIENT)
Dept: INTERNAL MEDICINE | Facility: CLINIC | Age: 77
End: 2022-06-30

## 2022-06-30 NOTE — TELEPHONE ENCOUNTER
Caller: ANSON    Relationship: [facility]    Best call back number: [phone]    What orders are you requesting (i.e. lab or imaging): OCCUPATIONAL THERAPY     In what timeframe would the patient need to come in: AS SOON AS POSSIBLE    Where will you receive your lab/imaging services: AT HOME    Additional notes: ANSON IS REQUESTING 1X 2 WEEKS

## 2022-07-07 LAB — HEMOCCULT STL QL IA: NEGATIVE

## 2022-07-13 ENCOUNTER — TELEPHONE (OUTPATIENT)
Dept: INTERNAL MEDICINE | Facility: CLINIC | Age: 77
End: 2022-07-13

## 2022-07-13 NOTE — TELEPHONE ENCOUNTER
Caller: CHEO HU    Relationship: Brother/Sister    Best call back number: [phone]    Caller requesting test results: BROTHER OF PATIENT    What test was performed: BLOOD TEST    When was the test performed: 6.27.22    Where was the test performed: [place]    Additional notes: PATIENTS BROTHER CALLED REQUESTING TEST RESULTS FOR PATIENTS BLOOD TEST.    PLEASE CALL BROTHER BACK AND ADVISE.

## 2022-07-30 LAB
FERRITIN SERPL-MCNC: 46 NG/ML (ref 30–400)
FOLATE SERPL-MCNC: 12.4 NG/ML
IRON SATN MFR SERPL: 16 % (ref 15–55)
IRON SERPL-MCNC: 58 UG/DL (ref 38–169)
TIBC SERPL-MCNC: 365 UG/DL (ref 250–450)
UIBC SERPL-MCNC: 307 UG/DL (ref 111–343)
VIT B12 SERPL-MCNC: 317 PG/ML (ref 232–1245)

## 2022-08-01 RX ORDER — ATORVASTATIN CALCIUM 40 MG/1
TABLET, FILM COATED ORAL
Qty: 90 TABLET | Refills: 1 | Status: SHIPPED | OUTPATIENT
Start: 2022-08-01 | End: 2023-01-03 | Stop reason: SDUPTHER

## 2022-08-08 ENCOUNTER — TELEPHONE (OUTPATIENT)
Dept: INTERNAL MEDICINE | Facility: CLINIC | Age: 77
End: 2022-08-08

## 2022-08-08 NOTE — TELEPHONE ENCOUNTER
Caller: GILSON    Relationship: [facility]    Best call back number: [phone]     What orders are you requesting (i.e. lab or imaging): CONTINUED PHYSICAL THERAPY    Where will you receive your lab/imaging services: [facility]    Additional notes: GILSON STATES SHE FAXED ORDERS FOR CONTINUED PHYSICAL THERAPY LAST WEEK AND HAS YET TO HEAR BACK. PLEASE CALL & ADVISE.

## 2022-08-30 ENCOUNTER — TELEPHONE (OUTPATIENT)
Dept: INTERNAL MEDICINE | Facility: CLINIC | Age: 77
End: 2022-08-30

## 2022-08-30 NOTE — TELEPHONE ENCOUNTER
Spoke with Brandy from [facility]. 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

## 2022-08-30 NOTE — TELEPHONE ENCOUNTER
Caller: LEE ANN    Relationship: Other    Best call back number: [phone]    What is the best time to reach you: ANYTIME    Who are you requesting to speak with (clinical staff, provider,  specific staff member):CLINICAL STAFF    Do you know the name of the person who called: SELF  What was the call regarding: LEE ANN CALLED AND STATED SHE NEEDS TO VERIFY IF THE PATIENT HAS DEMENTIA . PLEASE CALL   Do you require a callback:YES

## 2022-09-20 NOTE — TELEPHONE ENCOUNTER
KENTON IS CALLING BACK TO CHECK THE STATUS OF THIS ENCOUNTER.     PLEASE ADVISE [phone]    FAX- [phone]

## 2022-09-21 RX ORDER — LEVOTHYROXINE SODIUM 0.12 MG/1
TABLET ORAL
Qty: 90 TABLET | Refills: 3 | Status: SHIPPED | OUTPATIENT
Start: 2022-09-21

## 2022-12-13 RX ORDER — PANTOPRAZOLE SODIUM 40 MG/1
TABLET, DELAYED RELEASE ORAL
Qty: 30 TABLET | Refills: 11 | Status: SHIPPED | OUTPATIENT
Start: 2022-12-13 | End: 2023-01-03 | Stop reason: SDUPTHER

## 2022-12-21 DIAGNOSIS — R73.01 IFG (IMPAIRED FASTING GLUCOSE): Primary | ICD-10-CM

## 2022-12-21 DIAGNOSIS — E78.2 MIXED HYPERLIPIDEMIA: ICD-10-CM

## 2022-12-27 LAB
CHOLEST SERPL-MCNC: 125 MG/DL (ref 0–200)
ERYTHROCYTE [DISTWIDTH] IN BLOOD BY AUTOMATED COUNT: 12.9 % (ref 12.3–15.4)
HBA1C MFR BLD: 6.1 % (ref 4.8–5.6)
HCT VFR BLD AUTO: 41.4 % (ref 37.5–51)
HDLC SERPL-MCNC: 64 MG/DL (ref 40–60)
HGB BLD-MCNC: 13.7 G/DL (ref 13–17.7)
LDLC SERPL CALC-MCNC: 48 MG/DL (ref 0–100)
LDLC/HDLC SERPL: 0.77 {RATIO}
MCH RBC QN AUTO: 30.9 PG (ref 26.6–33)
MCHC RBC AUTO-ENTMCNC: 33.1 G/DL (ref 31.5–35.7)
MCV RBC AUTO: 93.5 FL (ref 79–97)
PLATELET # BLD AUTO: 163 10*3/MM3 (ref 140–450)
RBC # BLD AUTO: 4.43 10*6/MM3 (ref 4.14–5.8)
TRIGL SERPL-MCNC: 58 MG/DL (ref 0–150)
VLDLC SERPL CALC-MCNC: 13 MG/DL (ref 5–40)
WBC # BLD AUTO: 3.91 10*3/MM3 (ref 3.4–10.8)

## 2023-01-03 ENCOUNTER — OFFICE VISIT (OUTPATIENT)
Dept: INTERNAL MEDICINE | Facility: CLINIC | Age: 78
End: 2023-01-03
Payer: MEDICARE

## 2023-01-03 VITALS
WEIGHT: 201 LBS | DIASTOLIC BLOOD PRESSURE: 50 MMHG | HEART RATE: 78 BPM | BODY MASS INDEX: 27.22 KG/M2 | OXYGEN SATURATION: 96 % | HEIGHT: 72 IN | SYSTOLIC BLOOD PRESSURE: 86 MMHG | TEMPERATURE: 97.5 F

## 2023-01-03 DIAGNOSIS — D64.9 ANEMIA, UNSPECIFIED TYPE: ICD-10-CM

## 2023-01-03 DIAGNOSIS — J30.89 NON-SEASONAL ALLERGIC RHINITIS, UNSPECIFIED TRIGGER: ICD-10-CM

## 2023-01-03 DIAGNOSIS — E78.2 MIXED HYPERLIPIDEMIA: Primary | ICD-10-CM

## 2023-01-03 DIAGNOSIS — R73.01 IFG (IMPAIRED FASTING GLUCOSE): ICD-10-CM

## 2023-01-03 PROCEDURE — 1160F RVW MEDS BY RX/DR IN RCRD: CPT | Performed by: FAMILY MEDICINE

## 2023-01-03 PROCEDURE — 1159F MED LIST DOCD IN RCRD: CPT | Performed by: FAMILY MEDICINE

## 2023-01-03 PROCEDURE — 99214 OFFICE O/P EST MOD 30 MIN: CPT | Performed by: FAMILY MEDICINE

## 2023-01-03 RX ORDER — FLUTICASONE PROPIONATE 50 MCG
2 SPRAY, SUSPENSION (ML) NASAL DAILY
Qty: 48 G | Refills: 3 | Status: SHIPPED | OUTPATIENT
Start: 2023-01-03

## 2023-01-03 RX ORDER — ATORVASTATIN CALCIUM 40 MG/1
40 TABLET, FILM COATED ORAL DAILY
Qty: 90 TABLET | Refills: 1 | Status: SHIPPED | OUTPATIENT
Start: 2023-01-03

## 2023-01-03 RX ORDER — PANTOPRAZOLE SODIUM 40 MG/1
40 TABLET, DELAYED RELEASE ORAL DAILY
Qty: 90 TABLET | Refills: 2 | Status: SHIPPED | OUTPATIENT
Start: 2023-01-03

## 2023-01-03 NOTE — PROGRESS NOTES
Subjective   Darshan Bhatt is a 77 y.o. male.   Chief Complaint   Patient presents with   • Hyperlipidemia   • Hyperglycemia       History of Present Illness     1.  Hyperlipidemia-patient is on atorvastatin 40 mg a day.  He takes every day.  LDL 48, HDL 64.  Blood pressure is low today.  He is asymptomatic.  He holds p.m. dose of metoprolol in the evenings when blood pressure is low.     He has a history of coronary artery disease.  He had stents placed in 2007.  He has A. fib and he follow-up with cardiologist dr García.  He is on metoprolol 12.5 mg mg twice a day and Xarelto.     He does not smoke cigarettes.  He is a former smoker.  He quit at age of 33.  .  Family history is positive for stroke in his mother.     2.  Impaired fasting glucose- A1c 6.1 from 6.5 from 5.7.  He attributes the increased to holidays.     3.  Anemia-hemoglobin 13.7 from 12.4. No blood in stool, no blood in urine.    4. Allergic rhinitis-he uses Flonase almost every day.  It helps with congestion and runny nose.  He has no side effects.  No nosebleeds.    The following portions of the patient's history were reviewed and updated as appropriate: allergies, current medications, past family history, past medical history, past social history, past surgical history and problem list.    Review of Systems   Constitutional: Negative.    Respiratory: Negative for shortness of breath.    Cardiovascular: Negative.  Negative for chest pain.         Objective   Wt Readings from Last 3 Encounters:   01/03/23 91.2 kg (201 lb)   06/27/22 87.5 kg (193 lb)   05/24/22 83.5 kg (184 lb)      Vitals:    01/03/23 1140   BP: (!) 86/50   Pulse: 78   Temp: 97.5 °F (36.4 °C)   SpO2: 96%     Temp Readings from Last 3 Encounters:   01/03/23 97.5 °F (36.4 °C)   06/27/22 98.2 °F (36.8 °C)   05/24/22 97.3 °F (36.3 °C)     BP Readings from Last 3 Encounters:   01/03/23 (!) 86/50   06/27/22 136/76   05/24/22 96/60     Pulse Readings from Last 3 Encounters:   01/03/23 78    06/27/22 77   05/24/22 83     Body mass index is 27.25 kg/m².    Physical Exam  Constitutional:       Appearance: He is well-developed.   Neck:      Thyroid: No thyromegaly.      Vascular: No carotid bruit.   Cardiovascular:      Rate and Rhythm: Normal rate and regular rhythm.      Heart sounds: Normal heart sounds.   Pulmonary:      Effort: Pulmonary effort is normal.      Breath sounds: Normal breath sounds.   Skin:     General: Skin is warm and dry.   Neurological:      Mental Status: He is alert and oriented to person, place, and time.   Psychiatric:         Behavior: Behavior normal.         Assessment & Plan   Diagnoses and all orders for this visit:    1. Mixed hyperlipidemia (Primary)    2. IFG (impaired fasting glucose)    3. Non-seasonal allergic rhinitis, unspecified trigger    4. Anemia, unspecified type    Other orders  -     atorvastatin (LIPITOR) 40 MG tablet; Take 1 tablet by mouth Daily.  Dispense: 90 tablet; Refill: 1  -     fluticasone (FLONASE) 50 MCG/ACT nasal spray; 2 sprays into the nostril(s) as directed by provider Daily.  Dispense: 48 g; Refill: 3        1.  Hyperlipidemia-continue current treatment.  Increase fluids to help to maintain better blood pressure.  Follow-up in 6 months.  2.  Impaired fasting glucose- lower carbs and sweets.  Follow-up in 6 months.  3.  Allergic rhinitis-continue current treatment.  Follow-up in 12 months.  4.  Anemia-resolved.  We will continue to monitor.

## 2023-01-05 ENCOUNTER — TELEPHONE (OUTPATIENT)
Dept: INTERNAL MEDICINE | Facility: CLINIC | Age: 78
End: 2023-01-05
Payer: MEDICARE

## 2023-01-05 NOTE — TELEPHONE ENCOUNTER
Caller: HIWOT    Relationship: [facility] hospital    Best call back number: [phone]    What orders are you requesting (i.e. lab or imaging): EXTRA VISIT TO GET URINE SAMPLE     In what timeframe would the patient need to come in: TOMORROW 01/06/22    Where will you receive your lab/imaging services: HOME    Additional notes: ASKING FOR EXTRA VISIT TO GET URINE SAMPLE, POSSIBLE URINARY TRACT INFECTION. PATIENT HAS A CATHETER.      PLEASE CALL ASAP TO ADVISE.

## 2023-01-18 ENCOUNTER — TELEPHONE (OUTPATIENT)
Dept: INTERNAL MEDICINE | Facility: CLINIC | Age: 78
End: 2023-01-18

## 2023-01-18 NOTE — TELEPHONE ENCOUNTER
Caller: CHEO HU    Relationship: Brother/Sister    Best call back number: [phone]    What was the call regarding: CHEO STATES THAT [facility] MAY CONTACT THE OFFICE TO GET MEDICAL RECORDS AND THINGS LIKE THAT. CHEO WANTS TO MAKE SURE THAT DR ESPINO KNOWS THAT THE PATIENT IS STILL WANTING DR ESPINO AS HIS PCP AND HE WILL BE USING THE PROVIDER AT THE [facility] WHEN DR ESPINO IS NOT AVAILABLE    PLEASE ADVISE     Do you require a callback: NO

## 2023-05-10 DIAGNOSIS — E78.2 MIXED HYPERLIPIDEMIA: ICD-10-CM

## 2023-05-10 DIAGNOSIS — E03.9 ACQUIRED HYPOTHYROIDISM: ICD-10-CM

## 2023-05-10 DIAGNOSIS — R73.01 IFG (IMPAIRED FASTING GLUCOSE): Primary | ICD-10-CM

## 2023-05-11 LAB
ALBUMIN SERPL-MCNC: 4 G/DL (ref 3.5–5.2)
ALBUMIN/GLOB SERPL: 1.7 G/DL
ALP SERPL-CCNC: 60 U/L (ref 39–117)
ALT SERPL-CCNC: 7 U/L (ref 1–41)
AST SERPL-CCNC: 13 U/L (ref 1–40)
BILIRUB SERPL-MCNC: 1.6 MG/DL (ref 0–1.2)
BUN SERPL-MCNC: 18 MG/DL (ref 8–23)
BUN/CREAT SERPL: 11.6 (ref 7–25)
CALCIUM SERPL-MCNC: 9.7 MG/DL (ref 8.6–10.5)
CHLORIDE SERPL-SCNC: 102 MMOL/L (ref 98–107)
CHOLEST SERPL-MCNC: 108 MG/DL (ref 0–200)
CO2 SERPL-SCNC: 29.8 MMOL/L (ref 22–29)
CREAT SERPL-MCNC: 1.55 MG/DL (ref 0.76–1.27)
EGFRCR SERPLBLD CKD-EPI 2021: 45.2 ML/MIN/1.73
GLOBULIN SER CALC-MCNC: 2.4 GM/DL
GLUCOSE SERPL-MCNC: 97 MG/DL (ref 65–99)
HBA1C MFR BLD: 5.6 % (ref 4.8–5.6)
HDLC SERPL-MCNC: 56 MG/DL (ref 40–60)
LDLC SERPL CALC-MCNC: 39 MG/DL (ref 0–100)
LDLC/HDLC SERPL: 0.73 {RATIO}
POTASSIUM SERPL-SCNC: 5 MMOL/L (ref 3.5–5.2)
PROT SERPL-MCNC: 6.4 G/DL (ref 6–8.5)
SODIUM SERPL-SCNC: 139 MMOL/L (ref 136–145)
TRIGL SERPL-MCNC: 56 MG/DL (ref 0–150)
TSH SERPL DL<=0.005 MIU/L-ACNC: 2.08 UIU/ML (ref 0.45–4.5)
VLDLC SERPL CALC-MCNC: 13 MG/DL (ref 5–40)

## 2023-09-05 RX ORDER — PANTOPRAZOLE SODIUM 40 MG/1
TABLET, DELAYED RELEASE ORAL
Qty: 90 TABLET | Refills: 1 | Status: SHIPPED | OUTPATIENT
Start: 2023-09-05

## 2023-09-26 ENCOUNTER — TELEPHONE (OUTPATIENT)
Dept: INTERNAL MEDICINE | Facility: CLINIC | Age: 78
End: 2023-09-26
Payer: MEDICARE

## 2023-09-26 NOTE — TELEPHONE ENCOUNTER
Caller: DAVID DUDLEY [facility]    Relationship: Other    Best call back number: [phone]     What orders are you requesting (i.e. lab or imaging): PHYSICAL THERAPY EVALUATION     In what timeframe would the patient need to come in: ASAP

## 2023-10-02 RX ORDER — LEVOTHYROXINE SODIUM 0.12 MG/1
TABLET ORAL
Qty: 90 TABLET | Refills: 3 | Status: SHIPPED | OUTPATIENT
Start: 2023-10-02

## 2023-11-09 ENCOUNTER — TELEPHONE (OUTPATIENT)
Dept: INTERNAL MEDICINE | Facility: CLINIC | Age: 78
End: 2023-11-09

## 2023-11-09 NOTE — TELEPHONE ENCOUNTER
Caller: CHEO HU    Relationship to patient: Brother/Sister    Best call back number: [phone]    Chief complaint: FOLLOW UP    Type of visit: OFFICE VISIT    Requested date: 1/23/24 AROUND 11AM     If rescheduling, when is the original appointment: 1/24

## 2024-01-26 RX ORDER — ATORVASTATIN CALCIUM 40 MG/1
TABLET, FILM COATED ORAL
Qty: 90 TABLET | Refills: 0 | Status: SHIPPED | OUTPATIENT
Start: 2024-01-26

## 2024-01-26 RX ORDER — ATORVASTATIN CALCIUM 40 MG/1
40 TABLET, FILM COATED ORAL DAILY
Qty: 90 TABLET | Refills: 0 | OUTPATIENT
Start: 2024-01-26

## 2024-01-26 NOTE — TELEPHONE ENCOUNTER
Caller: CHEO HU    Relationship: Brother/Sister    Best call back number: [phone]*    Requested Prescriptions:   Requested Prescriptions     Pending Prescriptions Disp Refills    atorvastatin (LIPITOR) 40 MG tablet 90 tablet 0     Sig: Take 1 tablet by mouth Daily.        Pharmacy where request should be sent: [facility] - [facility] [address] - [phone]  - [phone] FX     Last office visit with prescribing clinician: 7/19/2023   Last telemedicine visit with prescribing clinician: Visit date not found   Next office visit with prescribing clinician: Visit date not found     Additional details provided by patient: PATIENT'S BROTHER CALLING REQUESTING A REFILL OF THIS MEDICATION FOR THE PATIENT.    Does the patient have less than a 3 day supply:  [] Yes  [x] No    Would you like a call back once the refill request has been completed: [] Yes [x] No    If the office needs to give you a call back, can they leave a voicemail: [] Yes [x] No    Aleena Wilks   01/26/24 13:41 EST

## 2024-02-26 RX ORDER — FLUTICASONE PROPIONATE 50 MCG
SPRAY, SUSPENSION (ML) NASAL
Qty: 48 G | Refills: 3 | Status: SHIPPED | OUTPATIENT
Start: 2024-02-26

## 2024-04-10 RX ORDER — ATORVASTATIN CALCIUM 40 MG/1
TABLET, FILM COATED ORAL
Qty: 90 TABLET | Refills: 0 | Status: SHIPPED | OUTPATIENT
Start: 2024-04-10

## 2024-05-14 ENCOUNTER — TELEPHONE (OUTPATIENT)
Dept: INTERNAL MEDICINE | Facility: CLINIC | Age: 79
End: 2024-05-14
Payer: MEDICARE

## 2024-05-14 NOTE — TELEPHONE ENCOUNTER
FYI: PATIENTS BROTHER (CHEO) CALLED AND REQUESTED THAT DR. ESPINO ADMIT THE PATIENT TO THE HOSPITAL. I INFORMED CHEO THAT THE ONLY WAY TO HAVE  ADMITTED IS TO TAKE HIM TO THE ED. CHEO DECLINED THE ED BECAUSE THE PATIENT JUST WENT YESTERDAY FOR THE SAME THING AND THE HOSPITAL WOULD NOT ADMIT HIM. CHEO ALSO ADMITTED THAT ' [facility] ADVISED THAT  RETURN TO THE ED. CHEO INSISTED THAT DR. ESPINO SEE  TODAY. I INFORMED CHEO THAT DR. ESPINO WOULD ADVISE THE ED AND HE REFUSED UNLESS THE PATIENT GETS WORSE. THE PATIENT IS SCHEDULED FOR DR. ESPINO'S FIRST AVAILABLE APPOINTMENT TOMORROW 05/15/24.

## 2024-05-16 ENCOUNTER — READMISSION MANAGEMENT (OUTPATIENT)
Dept: CALL CENTER | Facility: HOSPITAL | Age: 79
End: 2024-05-16
Payer: MEDICARE

## 2024-05-16 NOTE — OUTREACH NOTE
Prep Survey      Flowsheet Row Responses   [facility] discharged from? Non-[facility]   Is LACE score < 7 ? Non-BH Discharge   Eligibility [facility]   Date of Admission 05/14/24   Date of Discharge 05/16/24   Discharge Disposition Home-Health Care Sv   Discharge diagnosis Orthostatic hypotension, syncope & collapse   Does the patient have one of the following disease processes/diagnoses(primary or secondary)? Other   Does the patient have Home health ordered? Yes   Prep survey completed? Yes            Ema HAGER - Registered Nurse

## 2024-05-17 ENCOUNTER — TRANSITIONAL CARE MANAGEMENT TELEPHONE ENCOUNTER (OUTPATIENT)
Dept: CALL CENTER | Facility: HOSPITAL | Age: 79
End: 2024-05-17
Payer: MEDICARE

## 2024-05-17 NOTE — OUTREACH NOTE
Call Center TCM Note      Flowsheet Row Responses   [facility] [place] patient discharged from? Non-  [[facility]]   Does the patient have one of the following disease processes/diagnoses(primary or secondary)? Other   TCM attempt successful? Yes   Call start time 1133   Call end time 1141   Discharge diagnosis Orthostatic hypotension, syncope & collapse   Is patient permission given to speak with other caregiver? Yes   List who call center can speak with Brother Enrico Bhatt   Person spoke with today (if not patient) and relationship patient and then called brother Enrico Casas reviewed with patient/caregiver? Yes   Does the patient have all medications ordered at discharge? Yes   Is the patient taking all medications as directed (includes completed medication regime)? Yes  [Staff at [facility] manage patient medications.]   Comments PCP Dr Hooper. Hospital follow up scheduled for 5/28  115pm.   Does the patient have an appointment with their PCP within 7-14 days of discharge? No   Nursing Interventions Assisted patient with making appointment per protocol, Routed TCM call to PCP office   What is the Home health agency?  [facility]   Has home health visited the patient within 72 hours of discharge? Call prior to 72 hours   Psychosocial issues? No   Psychosocial comments Patient resides at assisted living facility and brother Enrico transports patient to [facility].   Did the patient receive a copy of their discharge instructions? Yes   Nursing interventions Reviewed instructions with patient   What is the patient's perception of their health status since discharge? Improving   Is the patient/caregiver able to teach back signs and symptoms related to disease process for when to call PCP? Yes   Is the patient/caregiver able to teach back signs and symptoms related to disease process for when to call 911? Yes   Is the patient/caregiver able to teach back the hierarchy of who to call/visit for symptoms/problems?  PCP, Specialist, Home health nurse, Urgent Care, ED, 911 Yes   If the patient is a current smoker, are they able to teach back resources for cessation? Not a smoker   TCM call completed? Yes   Call end time 1141   Would this patient benefit from a Referral to [facility] Social Work? No   Is the patient interested in additional calls from an ambulatory ? No            Ana Verdugo RN    5/17/2024, 11:43 EDT

## 2024-06-13 ENCOUNTER — LAB REQUISITION (OUTPATIENT)
Dept: LAB | Facility: HOSPITAL | Age: 79
End: 2024-06-13

## 2024-06-13 DIAGNOSIS — E55.9 VITAMIN D DEFICIENCY, UNSPECIFIED: ICD-10-CM

## 2024-06-13 DIAGNOSIS — E03.9 HYPOTHYROIDISM, UNSPECIFIED: ICD-10-CM

## 2024-06-13 LAB
25(OH)D3 SERPL-MCNC: 29.6 NG/ML (ref 30–100)
PTH-INTACT SERPL-MCNC: 59.5 PG/ML (ref 15–65)
T4 FREE SERPL-MCNC: 1.81 NG/DL (ref 0.93–1.7)
TSH SERPL DL<=0.05 MIU/L-ACNC: 0.66 UIU/ML (ref 0.27–4.2)
VIT B12 BLD-MCNC: 319 PG/ML (ref 211–946)

## 2024-06-13 PROCEDURE — 83970 ASSAY OF PARATHORMONE: CPT | Performed by: NURSE PRACTITIONER

## 2024-06-13 PROCEDURE — 84443 ASSAY THYROID STIM HORMONE: CPT | Performed by: NURSE PRACTITIONER

## 2024-06-13 PROCEDURE — 82306 VITAMIN D 25 HYDROXY: CPT | Performed by: NURSE PRACTITIONER

## 2024-06-13 PROCEDURE — 82607 VITAMIN B-12: CPT | Performed by: NURSE PRACTITIONER

## 2024-06-13 PROCEDURE — 84439 ASSAY OF FREE THYROXINE: CPT | Performed by: NURSE PRACTITIONER

## 2024-06-21 DIAGNOSIS — R73.01 IFG (IMPAIRED FASTING GLUCOSE): ICD-10-CM

## 2024-06-21 DIAGNOSIS — E78.2 MIXED HYPERLIPIDEMIA: Primary | ICD-10-CM

## 2024-06-21 DIAGNOSIS — E03.9 ACQUIRED HYPOTHYROIDISM: ICD-10-CM

## 2024-06-22 LAB
ALBUMIN SERPL-MCNC: 4.2 G/DL (ref 3.5–5.2)
ALBUMIN/GLOB SERPL: 1.7 G/DL
ALP SERPL-CCNC: 60 U/L (ref 39–117)
ALT SERPL-CCNC: 6 U/L (ref 1–41)
AST SERPL-CCNC: 10 U/L (ref 1–40)
BILIRUB SERPL-MCNC: 0.8 MG/DL (ref 0–1.2)
BUN SERPL-MCNC: 26 MG/DL (ref 8–23)
BUN/CREAT SERPL: 21.7 (ref 7–25)
CALCIUM SERPL-MCNC: 9.2 MG/DL (ref 8.6–10.5)
CHLORIDE SERPL-SCNC: 102 MMOL/L (ref 98–107)
CHOLEST SERPL-MCNC: 115 MG/DL (ref 0–200)
CO2 SERPL-SCNC: 28.8 MMOL/L (ref 22–29)
CREAT SERPL-MCNC: 1.2 MG/DL (ref 0.76–1.27)
EGFRCR SERPLBLD CKD-EPI 2021: 61.1 ML/MIN/1.73
GLOBULIN SER CALC-MCNC: 2.5 GM/DL
GLUCOSE SERPL-MCNC: 114 MG/DL (ref 65–99)
HBA1C MFR BLD: 5.9 % (ref 4.8–5.6)
HDLC SERPL-MCNC: 58 MG/DL (ref 40–60)
LDLC SERPL CALC-MCNC: 44 MG/DL (ref 0–100)
LDLC/HDLC SERPL: 0.77 {RATIO}
POTASSIUM SERPL-SCNC: 4.7 MMOL/L (ref 3.5–5.2)
PROT SERPL-MCNC: 6.7 G/DL (ref 6–8.5)
SODIUM SERPL-SCNC: 142 MMOL/L (ref 136–145)
TRIGL SERPL-MCNC: 61 MG/DL (ref 0–150)
TSH SERPL DL<=0.005 MIU/L-ACNC: 0.95 UIU/ML (ref 0.45–4.5)
VLDLC SERPL CALC-MCNC: 13 MG/DL (ref 5–40)

## 2024-06-25 RX ORDER — PANTOPRAZOLE SODIUM 40 MG/1
TABLET, DELAYED RELEASE ORAL
Qty: 90 TABLET | Refills: 1 | Status: SHIPPED | OUTPATIENT
Start: 2024-06-25

## 2024-06-25 RX ORDER — ATORVASTATIN CALCIUM 40 MG/1
TABLET, FILM COATED ORAL
Qty: 90 TABLET | Refills: 1 | Status: SHIPPED | OUTPATIENT
Start: 2024-06-25

## 2024-08-26 ENCOUNTER — APPOINTMENT (OUTPATIENT)
Dept: CT IMAGING | Facility: HOSPITAL | Age: 79
End: 2024-08-26
Payer: MEDICARE

## 2024-08-26 ENCOUNTER — HOSPITAL ENCOUNTER (OUTPATIENT)
Facility: HOSPITAL | Age: 79
Setting detail: OBSERVATION
Discharge: NURSING, REHAB OR LONG-TERM CARE | End: 2024-08-29
Attending: EMERGENCY MEDICINE | Admitting: STUDENT IN AN ORGANIZED HEALTH CARE EDUCATION/TRAINING PROGRAM
Payer: MEDICARE

## 2024-08-26 DIAGNOSIS — R53.1 ACUTE LEFT-SIDED WEAKNESS: Primary | ICD-10-CM

## 2024-08-26 DIAGNOSIS — Z86.69 HISTORY OF PARKINSON'S DISEASE: ICD-10-CM

## 2024-08-26 DIAGNOSIS — Z86.79 HISTORY OF ATRIAL FIBRILLATION: ICD-10-CM

## 2024-08-26 PROBLEM — I45.6 WPW (WOLFF-PARKINSON-WHITE SYNDROME): Status: ACTIVE | Noted: 2024-08-26

## 2024-08-26 PROBLEM — F03.90 DEMENTIA WITHOUT BEHAVIORAL DISTURBANCE: Status: ACTIVE | Noted: 2024-08-26

## 2024-08-26 LAB
ANION GAP SERPL CALCULATED.3IONS-SCNC: 12 MMOL/L (ref 5–15)
BASOPHILS # BLD AUTO: 0.02 10*3/MM3 (ref 0–0.2)
BASOPHILS NFR BLD AUTO: 0.4 % (ref 0–1.5)
BUN SERPL-MCNC: 31 MG/DL (ref 8–23)
BUN/CREAT SERPL: 22.1 (ref 7–25)
CALCIUM SPEC-SCNC: 9.5 MG/DL (ref 8.6–10.5)
CHLORIDE SERPL-SCNC: 101 MMOL/L (ref 98–107)
CO2 SERPL-SCNC: 28 MMOL/L (ref 22–29)
CREAT SERPL-MCNC: 1.4 MG/DL (ref 0.76–1.27)
DEPRECATED RDW RBC AUTO: 42 FL (ref 37–54)
EGFRCR SERPLBLD CKD-EPI 2021: 50.8 ML/MIN/1.73
EOSINOPHIL # BLD AUTO: 0.25 10*3/MM3 (ref 0–0.4)
EOSINOPHIL NFR BLD AUTO: 4.5 % (ref 0.3–6.2)
ERYTHROCYTE [DISTWIDTH] IN BLOOD BY AUTOMATED COUNT: 12.5 % (ref 12.3–15.4)
GLUCOSE SERPL-MCNC: 88 MG/DL (ref 65–99)
HCT VFR BLD AUTO: 38.4 % (ref 37.5–51)
HGB BLD-MCNC: 12.8 G/DL (ref 13–17.7)
IMM GRANULOCYTES # BLD AUTO: 0.01 10*3/MM3 (ref 0–0.05)
IMM GRANULOCYTES NFR BLD AUTO: 0.2 % (ref 0–0.5)
LYMPHOCYTES # BLD AUTO: 1.52 10*3/MM3 (ref 0.7–3.1)
LYMPHOCYTES NFR BLD AUTO: 27.2 % (ref 19.6–45.3)
MAGNESIUM SERPL-MCNC: 2 MG/DL (ref 1.6–2.4)
MCH RBC QN AUTO: 30.8 PG (ref 26.6–33)
MCHC RBC AUTO-ENTMCNC: 33.3 G/DL (ref 31.5–35.7)
MCV RBC AUTO: 92.3 FL (ref 79–97)
MONOCYTES # BLD AUTO: 0.64 10*3/MM3 (ref 0.1–0.9)
MONOCYTES NFR BLD AUTO: 11.5 % (ref 5–12)
NEUTROPHILS NFR BLD AUTO: 3.14 10*3/MM3 (ref 1.7–7)
NEUTROPHILS NFR BLD AUTO: 56.2 % (ref 42.7–76)
PLATELET # BLD AUTO: 147 10*3/MM3 (ref 140–450)
PMV BLD AUTO: 10.8 FL (ref 6–12)
POTASSIUM SERPL-SCNC: 4.7 MMOL/L (ref 3.5–5.2)
QT INTERVAL: 393 MS
QTC INTERVAL: 436 MS
RBC # BLD AUTO: 4.16 10*6/MM3 (ref 4.14–5.8)
SODIUM SERPL-SCNC: 141 MMOL/L (ref 136–145)
WBC NRBC COR # BLD AUTO: 5.58 10*3/MM3 (ref 3.4–10.8)

## 2024-08-26 PROCEDURE — 83735 ASSAY OF MAGNESIUM: CPT | Performed by: EMERGENCY MEDICINE

## 2024-08-26 PROCEDURE — 80048 BASIC METABOLIC PNL TOTAL CA: CPT | Performed by: EMERGENCY MEDICINE

## 2024-08-26 PROCEDURE — 85025 COMPLETE CBC W/AUTO DIFF WBC: CPT | Performed by: EMERGENCY MEDICINE

## 2024-08-26 PROCEDURE — G0378 HOSPITAL OBSERVATION PER HR: HCPCS

## 2024-08-26 PROCEDURE — 99285 EMERGENCY DEPT VISIT HI MDM: CPT

## 2024-08-26 PROCEDURE — 93005 ELECTROCARDIOGRAM TRACING: CPT | Performed by: EMERGENCY MEDICINE

## 2024-08-26 PROCEDURE — 25810000003 LACTATED RINGERS PER 1000 ML: Performed by: STUDENT IN AN ORGANIZED HEALTH CARE EDUCATION/TRAINING PROGRAM

## 2024-08-26 PROCEDURE — 87102 FUNGUS ISOLATION CULTURE: CPT | Performed by: STUDENT IN AN ORGANIZED HEALTH CARE EDUCATION/TRAINING PROGRAM

## 2024-08-26 PROCEDURE — 70450 CT HEAD/BRAIN W/O DYE: CPT

## 2024-08-26 PROCEDURE — 96361 HYDRATE IV INFUSION ADD-ON: CPT

## 2024-08-26 PROCEDURE — 96360 HYDRATION IV INFUSION INIT: CPT

## 2024-08-26 PROCEDURE — 93010 ELECTROCARDIOGRAM REPORT: CPT | Performed by: STUDENT IN AN ORGANIZED HEALTH CARE EDUCATION/TRAINING PROGRAM

## 2024-08-26 RX ORDER — DOCUSATE SODIUM 100 MG/1
100 CAPSULE, LIQUID FILLED ORAL DAILY PRN
COMMUNITY

## 2024-08-26 RX ORDER — VENLAFAXINE HYDROCHLORIDE 150 MG/1
150 CAPSULE, EXTENDED RELEASE ORAL DAILY
Status: DISCONTINUED | OUTPATIENT
Start: 2024-08-26 | End: 2024-08-29 | Stop reason: HOSPADM

## 2024-08-26 RX ORDER — POLYETHYLENE GLYCOL 3350 17 G/17G
17 POWDER, FOR SOLUTION ORAL DAILY PRN
Status: DISCONTINUED | OUTPATIENT
Start: 2024-08-26 | End: 2024-08-29 | Stop reason: HOSPADM

## 2024-08-26 RX ORDER — MIDODRINE HYDROCHLORIDE 5 MG/1
10 TABLET ORAL
Status: DISCONTINUED | OUTPATIENT
Start: 2024-08-27 | End: 2024-08-28

## 2024-08-26 RX ORDER — CARBIDOPA AND LEVODOPA 50; 200 MG/1; MG/1
1 TABLET, EXTENDED RELEASE ORAL 2 TIMES DAILY
Status: DISCONTINUED | OUTPATIENT
Start: 2024-08-26 | End: 2024-08-29 | Stop reason: HOSPADM

## 2024-08-26 RX ORDER — BISACODYL 10 MG
10 SUPPOSITORY, RECTAL RECTAL DAILY PRN
Status: DISCONTINUED | OUTPATIENT
Start: 2024-08-26 | End: 2024-08-29 | Stop reason: HOSPADM

## 2024-08-26 RX ORDER — VENLAFAXINE HYDROCHLORIDE 150 MG/1
150 CAPSULE, EXTENDED RELEASE ORAL DAILY
COMMUNITY

## 2024-08-26 RX ORDER — ATORVASTATIN CALCIUM 20 MG/1
40 TABLET, FILM COATED ORAL DAILY
Status: DISCONTINUED | OUTPATIENT
Start: 2024-08-26 | End: 2024-08-29 | Stop reason: HOSPADM

## 2024-08-26 RX ORDER — BISACODYL 5 MG/1
5 TABLET, DELAYED RELEASE ORAL DAILY PRN
Status: DISCONTINUED | OUTPATIENT
Start: 2024-08-26 | End: 2024-08-29 | Stop reason: HOSPADM

## 2024-08-26 RX ORDER — TRAZODONE HYDROCHLORIDE 50 MG/1
50 TABLET, FILM COATED ORAL NIGHTLY
Status: DISCONTINUED | OUTPATIENT
Start: 2024-08-26 | End: 2024-08-29 | Stop reason: HOSPADM

## 2024-08-26 RX ORDER — MIDODRINE HYDROCHLORIDE 10 MG/1
10 TABLET ORAL
COMMUNITY
End: 2024-08-28 | Stop reason: HOSPADM

## 2024-08-26 RX ORDER — LEVOTHYROXINE SODIUM 112 UG/1
112 TABLET ORAL
Status: DISCONTINUED | OUTPATIENT
Start: 2024-08-27 | End: 2024-08-29 | Stop reason: HOSPADM

## 2024-08-26 RX ORDER — ACETAMINOPHEN 650 MG/1
650 SUPPOSITORY RECTAL EVERY 4 HOURS PRN
Status: DISCONTINUED | OUTPATIENT
Start: 2024-08-26 | End: 2024-08-29 | Stop reason: HOSPADM

## 2024-08-26 RX ORDER — AMOXICILLIN 250 MG
2 CAPSULE ORAL 2 TIMES DAILY PRN
Status: DISCONTINUED | OUTPATIENT
Start: 2024-08-26 | End: 2024-08-29 | Stop reason: HOSPADM

## 2024-08-26 RX ORDER — TRAZODONE HYDROCHLORIDE 50 MG/1
50 TABLET, FILM COATED ORAL NIGHTLY
Status: ON HOLD | COMMUNITY
End: 2024-08-31

## 2024-08-26 RX ORDER — METOPROLOL TARTRATE 25 MG/1
25 TABLET, FILM COATED ORAL 2 TIMES DAILY
Status: DISCONTINUED | OUTPATIENT
Start: 2024-08-26 | End: 2024-08-29 | Stop reason: HOSPADM

## 2024-08-26 RX ORDER — ASPIRIN 81 MG/1
81 TABLET ORAL DAILY
Status: DISCONTINUED | OUTPATIENT
Start: 2024-08-26 | End: 2024-08-29 | Stop reason: HOSPADM

## 2024-08-26 RX ORDER — MAGNESIUM 200 MG
1 TABLET ORAL DAILY
COMMUNITY

## 2024-08-26 RX ORDER — ACETAMINOPHEN 325 MG/1
650 TABLET ORAL EVERY 4 HOURS PRN
Status: DISCONTINUED | OUTPATIENT
Start: 2024-08-26 | End: 2024-08-29 | Stop reason: HOSPADM

## 2024-08-26 RX ORDER — SENNOSIDES 8.6 MG
650 CAPSULE ORAL EVERY 6 HOURS PRN
COMMUNITY

## 2024-08-26 RX ORDER — SODIUM CHLORIDE 9 MG/ML
40 INJECTION, SOLUTION INTRAVENOUS AS NEEDED
Status: DISCONTINUED | OUTPATIENT
Start: 2024-08-26 | End: 2024-08-29 | Stop reason: HOSPADM

## 2024-08-26 RX ORDER — CARBIDOPA AND LEVODOPA 25; 100 MG/1; MG/1
1 TABLET ORAL
Status: DISCONTINUED | OUTPATIENT
Start: 2024-08-26 | End: 2024-08-29 | Stop reason: HOSPADM

## 2024-08-26 RX ORDER — ACETAMINOPHEN 160 MG/5ML
650 SOLUTION ORAL EVERY 4 HOURS PRN
Status: DISCONTINUED | OUTPATIENT
Start: 2024-08-26 | End: 2024-08-29 | Stop reason: HOSPADM

## 2024-08-26 RX ORDER — CALCIUM CARBONATE 500 MG/1
1 TABLET, CHEWABLE ORAL EVERY 4 HOURS PRN
COMMUNITY

## 2024-08-26 RX ORDER — SODIUM CHLORIDE, SODIUM LACTATE, POTASSIUM CHLORIDE, CALCIUM CHLORIDE 600; 310; 30; 20 MG/100ML; MG/100ML; MG/100ML; MG/100ML
100 INJECTION, SOLUTION INTRAVENOUS CONTINUOUS
Status: DISCONTINUED | OUTPATIENT
Start: 2024-08-26 | End: 2024-08-28

## 2024-08-26 RX ORDER — CHOLECALCIFEROL (VITAMIN D3) 125 MCG
1 TABLET ORAL EVERY MORNING
COMMUNITY

## 2024-08-26 RX ORDER — PANTOPRAZOLE SODIUM 40 MG/1
40 TABLET, DELAYED RELEASE ORAL DAILY
Status: DISCONTINUED | OUTPATIENT
Start: 2024-08-26 | End: 2024-08-29 | Stop reason: HOSPADM

## 2024-08-26 RX ORDER — POLYETHYLENE GLYCOL 3350 17 G/17G
17 POWDER, FOR SOLUTION ORAL DAILY PRN
COMMUNITY

## 2024-08-26 RX ORDER — SODIUM CHLORIDE 0.9 % (FLUSH) 0.9 %
10 SYRINGE (ML) INJECTION AS NEEDED
Status: DISCONTINUED | OUTPATIENT
Start: 2024-08-26 | End: 2024-08-29 | Stop reason: HOSPADM

## 2024-08-26 RX ORDER — PIMAVANSERIN TARTRATE 34 MG/1
34 CAPSULE ORAL DAILY
Status: ON HOLD | COMMUNITY
End: 2024-08-31

## 2024-08-26 RX ORDER — CARBIDOPA AND LEVODOPA 50; 200 MG/1; MG/1
1 TABLET, EXTENDED RELEASE ORAL 2 TIMES DAILY
COMMUNITY

## 2024-08-26 RX ORDER — LEVOTHYROXINE SODIUM 112 UG/1
112 TABLET ORAL DAILY
COMMUNITY

## 2024-08-26 RX ORDER — SODIUM CHLORIDE 0.9 % (FLUSH) 0.9 %
10 SYRINGE (ML) INJECTION EVERY 12 HOURS SCHEDULED
Status: DISCONTINUED | OUTPATIENT
Start: 2024-08-26 | End: 2024-08-29 | Stop reason: HOSPADM

## 2024-08-26 RX ADMIN — CARBIDOPA AND LEVODOPA 1 TABLET: 25; 100 TABLET ORAL at 21:10

## 2024-08-26 RX ADMIN — PANTOPRAZOLE SODIUM 40 MG: 40 TABLET, DELAYED RELEASE ORAL at 21:10

## 2024-08-26 RX ADMIN — VENLAFAXINE HYDROCHLORIDE 150 MG: 150 CAPSULE, EXTENDED RELEASE ORAL at 21:10

## 2024-08-26 RX ADMIN — ATORVASTATIN CALCIUM 40 MG: 20 TABLET, FILM COATED ORAL at 21:10

## 2024-08-26 RX ADMIN — METOPROLOL TARTRATE 25 MG: 25 TABLET, FILM COATED ORAL at 21:10

## 2024-08-26 RX ADMIN — CARBIDOPA AND LEVODOPA 1 TABLET: 50; 200 TABLET, EXTENDED RELEASE ORAL at 21:10

## 2024-08-26 RX ADMIN — TRAZODONE HYDROCHLORIDE 50 MG: 50 TABLET ORAL at 21:10

## 2024-08-26 RX ADMIN — ASPIRIN 81 MG: 81 TABLET, COATED ORAL at 21:10

## 2024-08-26 RX ADMIN — SODIUM CHLORIDE, POTASSIUM CHLORIDE, SODIUM LACTATE AND CALCIUM CHLORIDE 100 ML/HR: 600; 310; 30; 20 INJECTION, SOLUTION INTRAVENOUS at 21:10

## 2024-08-26 RX ADMIN — Medication 10 ML: at 21:11

## 2024-08-26 NOTE — ED PROVIDER NOTES
EMERGENCY DEPARTMENT ENCOUNTER    Room Number:  22/22  PCP: Blanca Hooper MD  Historian: Patient, EMS      HPI:  Chief Complaint: Transient left-sided weakness  A complete HPI/ROS/PMH/PSH/SH/FH are unobtainable due to: None    Context: Darshan Bhatt is a 78 y.o. male who presents to the ED via EMS from facility c/o acute left-sided weakness with facial droop and drooling.  Had resolved by the time EMS arrived.  Reportedly was seen for the same thing at [place] 4 days ago.  Recently had decrease in his Parkinson's medications.  Denies any current complaints otherwise.  No denies any chest pain or shortness of breath.      MEDICAL RECORD REVIEW    External (non-ED) record review: No recent brain MRI noted on chart review in epic, had a recent episode of A-fib seen at [place]              PAST MEDICAL HISTORY  Active Ambulatory Problems     Diagnosis Date Noted    PAF (paroxysmal atrial fibrillation) 06/21/2016    Anxiety 06/21/2016    Coronary arteriosclerosis in native artery 06/21/2016    Benign prostatic hyperplasia 06/21/2016    Depression 06/21/2016    Dizziness 06/21/2016    Hemorrhoids 06/21/2016    Hyperlipidemia 06/21/2016    Hypothyroidism 06/21/2016    Malignant melanoma of skin 06/21/2016    Sleep apnea 06/21/2016    Vitamin B12 deficiency 12/06/2016    Angiodysplasia of colon without bleeding 11/28/2017    Chronic anticoagulation 11/26/2017    IFG (impaired fasting glucose) 02/20/2019    Orthostatic hypotension 05/15/2019    Parkinson disease 05/24/2019    BPH with urinary obstruction 04/08/2020    Urinary retention 05/04/2020     Resolved Ambulatory Problems     Diagnosis Date Noted    Rib pain on left side 01/26/2017    Fall from bed 01/26/2017    Sinusitis 03/14/2017    Stercoral ulcer of rectum 11/28/2017     Past Medical History:   Diagnosis Date    A-fib     Allergic     BPH (benign prostatic hyperplasia)     CAD (coronary artery disease)     Cancer     Erectile dysfunction     Hypogonadism in  male     Movement disorder     Parkinson's disease     Thyroid disease          PAST SURGICAL HISTORY  Past Surgical History:   Procedure Laterality Date    CARDIAC CATHETERIZATION      COLONOSCOPY      INGUINAL HERNIA REPAIR      PROSTATE SURGERY      SKIN CANCER DESTRUCTION      SUPRAPUBIC TUBE PLACEMENT N/A 5/4/2020    Procedure: CYSTOSCOPY SUPRAPUBIC CATHETER PLACEMENT;  Surgeon: Linwood Cox MD;  Location: [facility] OR;  Service: Urology;  Laterality: N/A;    THYROIDECTOMY, PARTIAL           FAMILY HISTORY  Family History   Problem Relation Age of Onset    Heart disease Mother     Diabetes Father     Diabetes Paternal Grandmother          SOCIAL HISTORY  Social History     Socioeconomic History    Marital status:    Tobacco Use    Smoking status: Former    Smokeless tobacco: Former   Vaping Use    Vaping status: Never Used   Substance and Sexual Activity    Alcohol use: Yes     Comment: 2-3 / week  beer or bourbon    Drug use: Never    Sexual activity: Defer         ALLERGIES  Meloxicam        REVIEW OF SYSTEMS  Review of Systems     All systems reviewed and negative except for those discussed in HPI.       PHYSICAL EXAM    I have reviewed the triage vital signs and nursing notes.    ED Triage Vitals   Temp Heart Rate Resp BP SpO2   08/26/24 1453 08/26/24 1450 08/26/24 1450 08/26/24 1450 08/26/24 1450   97.4 °F (36.3 °C) 74 18 168/100 98 %      Temp src Heart Rate Source Patient Position BP Location FiO2 (%)   08/26/24 1453 08/26/24 1450 -- -- --   Tympanic Monitor          Physical Exam  General: No acute distress, nontoxic  HEENT: Mucous membranes moist, atraumatic, EOMI  Neck: Full ROM  Pulm: Symmetric chest rise, nonlabored, lungs CTAB  Cardiovascular: Regular rate and rhythm, intact distal pulses  GI: Soft, nontender, nondistended, no rebound, no guarding, bowel sounds present  MSK: Full ROM, no deformity  Skin: Warm, dry  Neuro: Awake, alert, oriented x 4, GCS 15, mildly generally weak but  no focal weakness compared to 1 side of the other, no facial droop, no dysarthria aphasia, moving all extremities, no focal deficits  Psych: Calm, cooperative        LAB RESULTS  Recent Results (from the past 24 hour(s))   ECG 12 Lead Stroke Evaluation    Collection Time: 08/26/24  3:45 PM   Result Value Ref Range    QT Interval 393 ms    QTC Interval 436 ms   Basic Metabolic Panel    Collection Time: 08/26/24  4:15 PM    Specimen: Arm, Right; Blood   Result Value Ref Range    Glucose 88 65 - 99 mg/dL    BUN 31 (H) 8 - 23 mg/dL    Creatinine 1.40 (H) 0.76 - 1.27 mg/dL    Sodium 141 136 - 145 mmol/L    Potassium 4.7 3.5 - 5.2 mmol/L    Chloride 101 98 - 107 mmol/L    CO2 28.0 22.0 - 29.0 mmol/L    Calcium 9.5 8.6 - 10.5 mg/dL    BUN/Creatinine Ratio 22.1 7.0 - 25.0    Anion Gap 12.0 5.0 - 15.0 mmol/L    eGFR 50.8 (L) >60.0 mL/min/1.73   Magnesium    Collection Time: 08/26/24  4:15 PM    Specimen: Arm, Right; Blood   Result Value Ref Range    Magnesium 2.0 1.6 - 2.4 mg/dL   CBC Auto Differential    Collection Time: 08/26/24  4:15 PM    Specimen: Arm, Right; Blood   Result Value Ref Range    WBC 5.58 3.40 - 10.80 10*3/mm3    RBC 4.16 4.14 - 5.80 10*6/mm3    Hemoglobin 12.8 (L) 13.0 - 17.7 g/dL    Hematocrit 38.4 37.5 - 51.0 %    MCV 92.3 79.0 - 97.0 fL    MCH 30.8 26.6 - 33.0 pg    MCHC 33.3 31.5 - 35.7 g/dL    RDW 12.5 12.3 - 15.4 %    RDW-SD 42.0 37.0 - 54.0 fl    MPV 10.8 6.0 - 12.0 fL    Platelets 147 140 - 450 10*3/mm3    Neutrophil % 56.2 42.7 - 76.0 %    Lymphocyte % 27.2 19.6 - 45.3 %    Monocyte % 11.5 5.0 - 12.0 %    Eosinophil % 4.5 0.3 - 6.2 %    Basophil % 0.4 0.0 - 1.5 %    Immature Grans % 0.2 0.0 - 0.5 %    Neutrophils, Absolute 3.14 1.70 - 7.00 10*3/mm3    Lymphocytes, Absolute 1.52 0.70 - 3.10 10*3/mm3    Monocytes, Absolute 0.64 0.10 - 0.90 10*3/mm3    Eosinophils, Absolute 0.25 0.00 - 0.40 10*3/mm3    Basophils, Absolute 0.02 0.00 - 0.20 10*3/mm3    Immature Grans, Absolute 0.01 0.00 - 0.05 10*3/mm3        Ordered the above labs and independently interpreted results. My findings will be discussed in the medical decision making section below        RADIOLOGY  No Radiology Exams Resulted Within Past 24 Hours    Ordered the above noted radiological studies.  Independently interpreted by me and my independent review of findings can be found in the ED Course.  See dictation for official radiology interpretation.      PROCEDURES    Procedures      EKG - Per my independent interpretation at 1553:    EKG Time: 1345  A-fib with a rate of 74  Normal axis  Normal intervals  No acute ischemic changes  No STEMI       No emergent changes compared to June 2017      MEDICATIONS GIVEN IN ER    Medications   sodium chloride 0.9 % bolus 500 mL (has no administration in time range)         PROGRESS, DATA ANALYSIS, CONSULTS, AND MEDICAL DECISION MAKING    Please note that this section constitutes my independent interpretation of clinical data including lab results, radiology, EKG's.  This constitutes my independent professional opinion regarding differential diagnosis and management of this patient.  It may include any factors such as history from outside sources, review of external records, social determinants of health, management of medications, response to those treatments, and discussions with other providers.    Differential Diagnosis and Plan: Initial concern for TIA or CVA, Parkinson's affect, arrhythmia, electrolyte abnormalities, intracranial hemorrhage, among others.  Plan for labs, CT head, supportive care, and likely hospitalization.    Additional sources:  - Discussed/ obtained information from independent historians: EMS reports resolution of symptoms prior to arrival     - (Social Determinants of Health): None     - Shared decision making:  Patient fully updated on and in agreement with the course and plan moving forward    ED Course as of 08/26/24 1726   Mon Aug 26, 2024   1701 WBC: 5.58 [DC]   1701 Hemoglobin(!):  12.8 [DC]   1701 Platelets: 147 [DC]   1701 Glucose: 88 [DC]   1701 BUN(!): 31 [DC]   1701 Creatinine(!): 1.40  1.2 two months ago [DC]   1701 Sodium: 141 [DC]   1701 Potassium: 4.7 [DC]   1701 Magnesium: 2.0 [DC]   1701 CT Head Without Contrast  Per my independent interpretation of the CT head, no overtly obvious intracranial hemorrhage although subtle finding could be missed and will defer to final radiology report [DC]   1725 Discussed with Dr. Ann, LHA, discussed patient's clinical course and findings today, treatment modalities, and the need for hospitalization. [DC]      ED Course User Index  [DC] Kemar Paz MD       Hospitalization Considered?: yes    Orders Placed During This Visit:  Orders Placed This Encounter   Procedures    CT Head Without Contrast    MRI Brain Without Contrast    Basic Metabolic Panel    Magnesium    CBC Auto Differential    LHA (on-call MD unless specified) Details    Inpatient Neurology Consult Stroke    ECG 12 Lead Stroke Evaluation    Initiate Observation Status    CBC & Differential       Additional orders considered but not placed:      Independent interpretation of labs, radiology studies, and discussions with consultants: See ED Course        AS OF 17:26 EDT VITALS:    BP - (!) 182/111  HR - 89  TEMP - 97.4 °F (36.3 °C) (Tympanic)  02 SATS - 98%          DIAGNOSIS  Final diagnoses:   Acute left-sided weakness   History of Parkinson's disease   History of atrial fibrillation         DISPOSITION  ED Disposition       ED Disposition   Decision to Admit    Condition   --    Comment   Level of Care: Telemetry [5]   Diagnosis: Left-sided weakness [948148]   Admitting Physician: CATRACHO ANN [[id number]]   Attending Physician: CATRACHO ANN [[id number]]   Bed Request Comments: not 5 [address]                  Please note that portions of this document were completed with a voice recognition program.    Note Disclaimer: At [facility], we believe that sharing information builds trust and  better relationships. You are receiving this note because you recently visited [facility]. It is possible you will see health information before a provider has talked with you about it. This kind of information can be easy to misunderstand. To help you fully understand what it means for your health, we urge you to discuss this note with your provider.                       Kemar Paz MD  08/26/24 5212

## 2024-08-26 NOTE — PROGRESS NOTES
Clinical Pharmacy Services: Medication History    Darshan Bhatt is a 78 y.o. male [facility]   Chief Complaint   Patient presents with    Weakness - Generalized       He  has a past medical history of A-fib, Allergic, BPH (benign prostatic hyperplasia), CAD (coronary artery disease), Cancer, Erectile dysfunction, Hyperlipidemia, Hypogonadism in male, Hypothyroidism, Movement disorder, Parkinson's disease, Sleep apnea, and Thyroid disease.    Allergies as of 08/26/2024 - Reviewed 08/26/2024   Allergen Reaction Noted    Meloxicam Nausea Only 06/21/2016       Medication information was obtained from: group home [facility]   Pharmacy and Phone Number:     Prior to Admission Medications       Prescriptions Last Dose Informant Patient Reported? Taking?    acetaminophen (TYLENOL) 650 MG 8 hr tablet  Nursing Home Yes Yes    Take 1 tablet by mouth Every 6 (Six) Hours As Needed for Mild Pain.    aspirin 81 MG tablet  Nursing Home Yes Yes    Take 1 tablet by mouth Daily.    atorvastatin (LIPITOR) 40 MG tablet  Nursing Home No Yes    TAKE 1 TABLET EVERY DAY    Patient taking differently:  Take 1 tablet by mouth Daily.    calcium carbonate (TUMS) 500 MG chewable tablet  Nursing Home Yes Yes    Chew 1 tablet Every 4 (Four) Hours As Needed for Indigestion or Heartburn.    carbidopa-levodopa (SINEMET)  MG per tablet  Nursing Home Yes Yes    Take 1.5 tablets by mouth 5 (Five) Times a Day. 530am  10am  1pm  4pm   6pm    carbidopa-levodopa CR (SINEMET CR)  MG per CR tablet  Nursing Home Yes Yes    Take 1 tablet by mouth 2 (Two) Times a Day. 8am  8pm    cetirizine (zyrTEC) 10 MG tablet  Nursing Home Yes Yes    Take 1 tablet by mouth Daily As Needed for Allergies or Rhinitis.    Cyanocobalamin (B-12) 1000 MCG sublingual tablet  Nursing Home Yes Yes    Place 1 tablet under the tongue Daily.    docusate sodium (COLACE) 100 MG capsule  Nursing Home Yes Yes    Take 1 capsule by mouth Daily As  Needed for Constipation.    Ergocalciferol (Vitamin D2) 50 MCG (2000 UT) tablet  Nursing Home Yes Yes    Take 1 tablet by mouth Every Morning.    Lactobacillus (ACIDOPHILUS PO)  Nursing Home Yes Yes    Take 1 tablet by mouth Every Morning.    levothyroxine (SYNTHROID, LEVOTHROID) 112 MCG tablet  Nursing Home Yes Yes    Take 1 tablet by mouth Daily.    melatonin 5 MG tablet tablet  Nursing Home Yes Yes    Take 1 tablet by mouth At Night As Needed (Sleep).    metoprolol tartrate (LOPRESSOR) 25 MG tablet  Nursing Home Yes Yes    Take 1 tablet by mouth 2 (Two) Times a Day.    midodrine (PROAMATINE) 10 MG tablet  Nursing Home Yes Yes    Take 1 tablet by mouth 3 (Three) Times a Day Before Meals.    mupirocin (BACTROBAN) 2 % cream  Nursing Home No Yes    Apply  topically to the appropriate area as directed 2 (Two) Times a Day.    Patient taking differently:  Apply 1 Application topically to the appropriate area as directed 2 (Two) Times a Day As Needed.    nitroglycerin (NITROSTAT) 0.3 MG SL tablet  Nursing Home Yes Yes    Place 1 tablet under the tongue Every 5 (Five) Minutes As Needed for Chest Pain.    pantoprazole (PROTONIX) 40 MG EC tablet  Nursing Home No Yes    TAKE 1 TABLET EVERY DAY    Patient taking differently:  Take 1 tablet by mouth Daily.    Pimavanserin Tartrate (Nuplazid) 34 MG capsule  Nursing Home Yes Yes    Take 1 capsule by mouth Daily.    polyethylene glycol (MIRALAX) 17 g packet  Nursing Home Yes Yes    Take 17 g by mouth Daily As Needed.    rivaroxaban (XARELTO) 20 MG tablet  Nursing Home Yes Yes    Take 1 tablet by mouth Daily.    traZODone (DESYREL) 50 MG tablet  Nursing Home Yes Yes    Take 1 tablet by mouth Every Night.    venlafaxine XR (EFFEXOR-XR) 150 MG 24 hr capsule  Nursing Home Yes Yes    Take 1 capsule by mouth Daily.    cephalexin (KEFLEX) 250 MG capsule   Yes No    Take 1 capsule by mouth Daily.    clotrimazole-betamethasone (LOTRISONE) 1-0.05 % cream   No No    Apply 1 application  topically to the appropriate area as directed 2 (Two) Times a Day.    fluticasone (FLONASE) 50 MCG/ACT nasal spray   No No    USE 2 SPRAYS NASALLY EVERY DAY AS DIRECTED    hydrocortisone 2.5 % cream   Yes No    As Needed.    ketoconazole (NIZORAL) 2 % cream   Yes No    As Needed.    levothyroxine (SYNTHROID, LEVOTHROID) 125 MCG tablet   No No    TAKE 1 TABLET EVERY DAY    loperamide (IMODIUM) 2 MG capsule   Yes No    Take 1 capsule by mouth 4 (Four) Times a Day As Needed for Diarrhea. Uses PRN    rivastigmine (EXELON) 6 MG capsule   Yes No    Take 1 capsule by mouth 2 (Two) Times a Day.    venlafaxine XR (EFFEXOR-XR) 37.5 MG 24 hr capsule   Yes No    Take 1 capsule by mouth Daily.    venlafaxine XR (EFFEXOR-XR) 75 MG 24 hr capsule   Yes No    Take 1 capsule by mouth Daily.              Medication notes:     This medication list is complete to the best of my knowledge as of 8/26/2024    Please call if questions.    David Thomas  Medication History Technician   [phone]    8/26/2024 18:46 EDT

## 2024-08-26 NOTE — ED NOTES
Nh staff reports he had a sudden onset of being unable to move his left side.  He was drooling.  On ems arrival he was able to move all extremities and has equal .  He was seen for the same thing at [place] 4 days ago.  NH recently reduced his parkinsons medication.  He has no complaints    Pt has dementia

## 2024-08-26 NOTE — Clinical Note
Physician of Record for Attribution - Please select from Treatment Team: MADIE BETTENCOURT [[id number]]   Review needed by CMO to determine Physician of Record: No

## 2024-08-26 NOTE — H&P
Patient Name:  Darshan Bhatt  YOB: 1945  MRN:  [id number]  Admit Date:  8/26/2024  Patient Care Team:  Blanca Hooper MD as PCP - General (Family Medicine)  Linwood Cox MD as Consulting Physician (Urology)  Darshan Cox MD as Consulting Physician (Dermatology)  Shreyas Ortiz MD as Consulting Physician (Sports Medicine)  Michael Taylor MD as Consulting Physician (Interventional Cardiology)  Zachary Navarro MD as Consulting Physician (Neurology)  Regan Tristan APRN as Nurse Practitioner (Neurology)  Iram Villalobos APRN as Nurse Practitioner (Psychiatry)  To Todd DPM (Podiatry)      Subjective   History Present Illness     Chief Complaint   Patient presents with    Weakness - Generalized         History of Present Illness  Mr. Bhatt is a 78 y.o. possible history of Sammy-Parkinson-White disease, coronary artery disease, paroxysmal atrial fibrillation, Parkinson disease, dementia, suprapubic catheter presenting with transient left-sided weakness with facial droop and drooling.  This had resolved prior to EMS arrival.  Reportedly had his Parkinson's medications decreased.  Patient resting comfortably in bed at time my interview.  No weakness.  Strength out of 5 bilaterally.  CT head with no acute findings.  Patient without any complaint at this time.  Appears he has been going to [facility] for his care since at least 2012.    Review of Systems   Constitutional:  Negative for chills and fever.   Respiratory:  Negative for cough and shortness of breath.    Cardiovascular:  Negative for chest pain and palpitations.   Gastrointestinal:  Negative for abdominal pain, diarrhea, nausea and vomiting.        Personal History     Past Medical History:   Diagnosis Date    A-fib     Allergic     BPH (benign prostatic hyperplasia)     CAD (coronary artery disease)     Cancer     skin    Erectile dysfunction     Hyperlipidemia     Hypogonadism in male      Hypothyroidism     Movement disorder     Parkinson's disease     Sleep apnea     Thyroid disease      Past Surgical History:   Procedure Laterality Date    CARDIAC CATHETERIZATION      COLONOSCOPY      INGUINAL HERNIA REPAIR      PROSTATE SURGERY      SKIN CANCER DESTRUCTION      SUPRAPUBIC TUBE PLACEMENT N/A 5/4/2020    Procedure: CYSTOSCOPY SUPRAPUBIC CATHETER PLACEMENT;  Surgeon: Linwood Cox MD;  Location: [facility] OR;  Service: Urology;  Laterality: N/A;    THYROIDECTOMY, PARTIAL       Family History   Problem Relation Age of Onset    Heart disease Mother     Diabetes Father     Diabetes Paternal Grandmother      Social History     Tobacco Use    Smoking status: Former    Smokeless tobacco: Former   Vaping Use    Vaping status: Never Used   Substance Use Topics    Alcohol use: Yes     Comment: 2-3 / week  beer or bourbon    Drug use: Never     No current facility-administered medications on file prior to encounter.     Current Outpatient Medications on File Prior to Encounter   Medication Sig Dispense Refill    acetaminophen (TYLENOL) 650 MG 8 hr tablet Take 1 tablet by mouth Every 6 (Six) Hours As Needed for Mild Pain.      aspirin 81 MG tablet Take 1 tablet by mouth Daily.      atorvastatin (LIPITOR) 40 MG tablet TAKE 1 TABLET EVERY DAY (Patient taking differently: Take 1 tablet by mouth Daily.) 90 tablet 1    calcium carbonate (TUMS) 500 MG chewable tablet Chew 1 tablet Every 4 (Four) Hours As Needed for Indigestion or Heartburn.      carbidopa-levodopa (SINEMET)  MG per tablet Take 1.5 tablets by mouth 5 (Five) Times a Day. 530am  10am  1pm  4pm   6pm      carbidopa-levodopa CR (SINEMET CR)  MG per CR tablet Take 1 tablet by mouth 2 (Two) Times a Day. 8am  8pm      cetirizine (zyrTEC) 10 MG tablet Take 1 tablet by mouth Daily As Needed for Allergies or Rhinitis.      Cyanocobalamin (B-12) 1000 MCG sublingual tablet Place 1 tablet under the tongue Daily.      docusate sodium (COLACE) 100  MG capsule Take 1 capsule by mouth Daily As Needed for Constipation.      Ergocalciferol (Vitamin D2) 50 MCG (2000 UT) tablet Take 1 tablet by mouth Every Morning.      Lactobacillus (ACIDOPHILUS PO) Take 1 tablet by mouth Every Morning.      levothyroxine (SYNTHROID, LEVOTHROID) 112 MCG tablet Take 1 tablet by mouth Daily.      melatonin 5 MG tablet tablet Take 1 tablet by mouth At Night As Needed (Sleep).      metoprolol tartrate (LOPRESSOR) 25 MG tablet Take 1 tablet by mouth 2 (Two) Times a Day.      midodrine (PROAMATINE) 10 MG tablet Take 1 tablet by mouth 3 (Three) Times a Day Before Meals.      mupirocin (BACTROBAN) 2 % cream Apply  topically to the appropriate area as directed 2 (Two) Times a Day. (Patient taking differently: Apply 1 Application topically to the appropriate area as directed 2 (Two) Times a Day As Needed.) 30 g 0    nitroglycerin (NITROSTAT) 0.3 MG SL tablet Place 1 tablet under the tongue Every 5 (Five) Minutes As Needed for Chest Pain.      pantoprazole (PROTONIX) 40 MG EC tablet TAKE 1 TABLET EVERY DAY (Patient taking differently: Take 1 tablet by mouth Daily.) 90 tablet 1    Pimavanserin Tartrate (Nuplazid) 34 MG capsule Take 1 capsule by mouth Daily.      polyethylene glycol (MIRALAX) 17 g packet Take 17 g by mouth Daily As Needed.      rivaroxaban (XARELTO) 20 MG tablet Take 1 tablet by mouth Daily.      traZODone (DESYREL) 50 MG tablet Take 1 tablet by mouth Every Night.      venlafaxine XR (EFFEXOR-XR) 150 MG 24 hr capsule Take 1 capsule by mouth Daily.      [DISCONTINUED] cephalexin (KEFLEX) 250 MG capsule Take 1 capsule by mouth Daily.      [DISCONTINUED] clotrimazole-betamethasone (LOTRISONE) 1-0.05 % cream Apply 1 application topically to the appropriate area as directed 2 (Two) Times a Day. 15 g 0    [DISCONTINUED] fluticasone (FLONASE) 50 MCG/ACT nasal spray USE 2 SPRAYS NASALLY EVERY DAY AS DIRECTED 48 g 3    [DISCONTINUED] hydrocortisone 2.5 % cream As Needed.       [DISCONTINUED] ketoconazole (NIZORAL) 2 % cream As Needed.      [DISCONTINUED] levothyroxine (SYNTHROID, LEVOTHROID) 125 MCG tablet TAKE 1 TABLET EVERY DAY 90 tablet 3    [DISCONTINUED] loperamide (IMODIUM) 2 MG capsule Take 1 capsule by mouth 4 (Four) Times a Day As Needed for Diarrhea. Uses PRN      [DISCONTINUED] rivastigmine (EXELON) 6 MG capsule Take 1 capsule by mouth 2 (Two) Times a Day.      [DISCONTINUED] venlafaxine XR (EFFEXOR-XR) 37.5 MG 24 hr capsule Take 1 capsule by mouth Daily.      [DISCONTINUED] venlafaxine XR (EFFEXOR-XR) 75 MG 24 hr capsule Take 1 capsule by mouth Daily.       Allergies   Allergen Reactions    Meloxicam Nausea Only       Objective    Objective     Vital Signs  Temp:  [97.4 °F (36.3 °C)-97.6 °F (36.4 °C)] 97.6 °F (36.4 °C)  Heart Rate:  [74-89] 89  Resp:  [18] 18  BP: (168-184)/(100-131) 182/117  SpO2:  [97 %-98 %] 98 %  on   ;   Device (Oxygen Therapy): room air  There is no height or weight on file to calculate BMI.    Physical Exam  Vitals and nursing note reviewed.   Constitutional:       General: He is not in acute distress.     Comments: Elderly, frail   Cardiovascular:      Rate and Rhythm: Normal rate and regular rhythm.   Pulmonary:      Effort: Pulmonary effort is normal. No respiratory distress.   Abdominal:      General: Abdomen is flat. There is no distension.      Tenderness: There is no abdominal tenderness.   Genitourinary:     Comments: Suprapubic catheter  Musculoskeletal:         General: No swelling or deformity.   Skin:     General: Skin is warm and dry.   Neurological:      General: No focal deficit present.      Mental Status: He is alert. Mental status is at baseline.      Sensory: No sensory deficit.      Motor: No weakness.      Comments: Alert to self, place, year         Results Review:  I reviewed the patient's new clinical results.  I reviewed the patient's new imaging results and agree with the interpretation.  I reviewed the patient's other test  results and agree with the interpretation  I personally viewed and interpreted the patient's EKG/Telemetry data  Discussed with ED provider.    Lab Results (last 24 hours)       Procedure Component Value Units Date/Time    CBC & Differential [[id number]]  (Abnormal) Collected: 08/26/24 1615    Specimen: Blood from Arm, Right Updated: 08/26/24 1642    Narrative:      The following orders were created for panel order CBC & Differential.  Procedure                               Abnormality         Status                     ---------                               -----------         ------                     CBC Auto Differential[[id number]]        Abnormal            Final result                 Please view results for these tests on the individual orders.    Basic Metabolic Panel [[id number]]  (Abnormal) Collected: 08/26/24 1615    Specimen: Blood from Arm, Right Updated: 08/26/24 1656     Glucose 88 mg/dL      BUN 31 mg/dL      Creatinine 1.40 mg/dL      Sodium 141 mmol/L      Potassium 4.7 mmol/L      Chloride 101 mmol/L      CO2 28.0 mmol/L      Calcium 9.5 mg/dL      BUN/Creatinine Ratio 22.1     Anion Gap 12.0 mmol/L      eGFR 50.8 mL/min/1.73     Narrative:      GFR Normal >60  Chronic Kidney Disease <60  Kidney Failure <15    The GFR formula is only valid for adults with stable renal function between ages 18 and 70.    Magnesium [[id number]]  (Normal) Collected: 08/26/24 1615    Specimen: Blood from Arm, Right Updated: 08/26/24 1656     Magnesium 2.0 mg/dL     CBC Auto Differential [[id number]]  (Abnormal) Collected: 08/26/24 1615    Specimen: Blood from Arm, Right Updated: 08/26/24 1642     WBC 5.58 10*3/mm3      RBC 4.16 10*6/mm3      Hemoglobin 12.8 g/dL      Hematocrit 38.4 %      MCV 92.3 fL      MCH 30.8 pg      MCHC 33.3 g/dL      RDW 12.5 %      RDW-SD 42.0 fl      MPV 10.8 fL      Platelets 147 10*3/mm3      Neutrophil % 56.2 %      Lymphocyte % 27.2 %      Monocyte % 11.5 %      Eosinophil % 4.5 %       Basophil % 0.4 %      Immature Grans % 0.2 %      Neutrophils, Absolute 3.14 10*3/mm3      Lymphocytes, Absolute 1.52 10*3/mm3      Monocytes, Absolute 0.64 10*3/mm3      Eosinophils, Absolute 0.25 10*3/mm3      Basophils, Absolute 0.02 10*3/mm3      Immature Grans, Absolute 0.01 10*3/mm3             Imaging Results (Last 24 Hours)       Procedure Component Value Units Date/Time    CT Head Without Contrast [[id number]] Collected: 08/26/24 1727     Updated: 08/26/24 1733    Narrative:      CT HEAD WO CONTRAST-     HISTORY:  transient left sided weakness     COMPARISON: MRI brain 3/12/2024     FINDINGS: The brain ventricles are symmetrical. There is no evidence of  hemorrhage, hydrocephalus or of abnormal extra-axial fluid. No focal  area of decreased attenuation to suggest acute infarction is identified.  Moderate vascular calcification is noted.       Impression:      No acute process is identified. Further evaluation could be  performed with a MRI examination of the brain as indicated.           Radiation dose reduction techniques were utilized, including automated  exposure modulation based on body size.     This report was finalized on 8/26/2024 5:30 PM by Dr. Diaz Lynn M.D  on Workstation: [id number]                   ECG 12 Lead Stroke Evaluation   Final Result   HEART RATE=74  bpm   RR Kwlbkicg=183  ms   IL Interval=  ms   P Horizontal Axis=  deg   P Front Axis=  deg   QRSD Interval=91  ms   QT Qhzosupx=532  ms   KDfM=022  ms   QRS Axis=3  deg   T Wave Axis=19  deg   - ABNORMAL ECG -   Atrial fibrillation   No Prior Tracing for Comparison   Electronically Signed By: Kirill Licona ([facility]) 2024-08-26 16:07:48   Date and Time of Study:2024-08-26 15:45:24           Assessment/Plan     Active Hospital Problems    Diagnosis  POA    **Left-sided weakness [R53.1]  Yes    Dementia without behavioral disturbance [F03.90]  Unknown    WPW (Sammy-Parkinson-White syndrome) [I45.6]  Yes    Parkinson disease [G20.A1]  Yes     Chronic anticoagulation [Z79.01]  Not Applicable    PAF (paroxysmal atrial fibrillation) [I48.0]  Yes    Hypothyroidism [E03.9]  Yes    Hyperlipidemia [E78.5]  Yes    Depression [F32.A]  Yes    Coronary arteriosclerosis in native artery [I25.10]  Yes      Resolved Hospital Problems   No resolved problems to display.     Transient left-sided weakness with facial droop  Parkinson disease  Dementia  -Has now resolved.  CT head with no acute findings.  MRI brain pending  -Neurology consulted  -Unlikely from recent change in Sinemet.  Patient is on long-acting and short acting Sinemet  -Check A1c and lipids    Hypertension  Hyperlipidemia  Coronary artery disease  Concealed Sammy-Parkinson-White syndrome  Permanent atrial fibrillation with SSS   -Resume aspirin, statin, metoprolol    CKD 3 A  -Baseline around 1.2, creatinine up to 1.4.  -IVF    Orthostatic hypotension  Supine hypertension  -Hold midodrine.  Continue metoprolol as above.  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 coli colonization    I discussed the patient's findings and my recommendations with patient, nursing staff, and ED provider.    VTE Prophylaxis - Xarelto (home med).  Code Status - Full code.       Genaro Elena MD  [facility]  08/26/24  19:36 EDT

## 2024-08-27 ENCOUNTER — APPOINTMENT (OUTPATIENT)
Dept: MRI IMAGING | Facility: HOSPITAL | Age: 79
End: 2024-08-27
Payer: MEDICARE

## 2024-08-27 LAB
ANION GAP SERPL CALCULATED.3IONS-SCNC: 8.1 MMOL/L (ref 5–15)
BUN SERPL-MCNC: 27 MG/DL (ref 8–23)
BUN/CREAT SERPL: 20.3 (ref 7–25)
CALCIUM SPEC-SCNC: 9.1 MG/DL (ref 8.6–10.5)
CHLORIDE SERPL-SCNC: 102 MMOL/L (ref 98–107)
CHOLEST SERPL-MCNC: 99 MG/DL (ref 0–200)
CO2 SERPL-SCNC: 29.9 MMOL/L (ref 22–29)
CREAT SERPL-MCNC: 1.33 MG/DL (ref 0.76–1.27)
DEPRECATED RDW RBC AUTO: 42.8 FL (ref 37–54)
EGFRCR SERPLBLD CKD-EPI 2021: 54 ML/MIN/1.73
ERYTHROCYTE [DISTWIDTH] IN BLOOD BY AUTOMATED COUNT: 12.4 % (ref 12.3–15.4)
GLUCOSE BLDC GLUCOMTR-MCNC: 96 MG/DL (ref 70–130)
GLUCOSE SERPL-MCNC: 90 MG/DL (ref 65–99)
HBA1C MFR BLD: 6 % (ref 4.8–5.6)
HCT VFR BLD AUTO: 37.5 % (ref 37.5–51)
HDLC SERPL-MCNC: 49 MG/DL (ref 40–60)
HGB BLD-MCNC: 12 G/DL (ref 13–17.7)
LDLC SERPL CALC-MCNC: 38 MG/DL (ref 0–100)
LDLC/HDLC SERPL: 0.82 {RATIO}
MCH RBC QN AUTO: 29.9 PG (ref 26.6–33)
MCHC RBC AUTO-ENTMCNC: 32 G/DL (ref 31.5–35.7)
MCV RBC AUTO: 93.5 FL (ref 79–97)
PLATELET # BLD AUTO: 139 10*3/MM3 (ref 140–450)
PMV BLD AUTO: 10.9 FL (ref 6–12)
POTASSIUM SERPL-SCNC: 4.2 MMOL/L (ref 3.5–5.2)
RBC # BLD AUTO: 4.01 10*6/MM3 (ref 4.14–5.8)
SODIUM SERPL-SCNC: 140 MMOL/L (ref 136–145)
TRIGL SERPL-MCNC: 48 MG/DL (ref 0–150)
VLDLC SERPL-MCNC: 12 MG/DL (ref 5–40)
WBC NRBC COR # BLD AUTO: 5.63 10*3/MM3 (ref 3.4–10.8)

## 2024-08-27 PROCEDURE — 99203 OFFICE O/P NEW LOW 30 MIN: CPT | Performed by: NURSE PRACTITIONER

## 2024-08-27 PROCEDURE — 97162 PT EVAL MOD COMPLEX 30 MIN: CPT

## 2024-08-27 PROCEDURE — 97530 THERAPEUTIC ACTIVITIES: CPT

## 2024-08-27 PROCEDURE — 82948 REAGENT STRIP/BLOOD GLUCOSE: CPT

## 2024-08-27 PROCEDURE — 80061 LIPID PANEL: CPT | Performed by: STUDENT IN AN ORGANIZED HEALTH CARE EDUCATION/TRAINING PROGRAM

## 2024-08-27 PROCEDURE — 36415 COLL VENOUS BLD VENIPUNCTURE: CPT | Performed by: STUDENT IN AN ORGANIZED HEALTH CARE EDUCATION/TRAINING PROGRAM

## 2024-08-27 PROCEDURE — 96361 HYDRATE IV INFUSION ADD-ON: CPT

## 2024-08-27 PROCEDURE — G0378 HOSPITAL OBSERVATION PER HR: HCPCS

## 2024-08-27 PROCEDURE — 97166 OT EVAL MOD COMPLEX 45 MIN: CPT

## 2024-08-27 PROCEDURE — 83036 HEMOGLOBIN GLYCOSYLATED A1C: CPT | Performed by: STUDENT IN AN ORGANIZED HEALTH CARE EDUCATION/TRAINING PROGRAM

## 2024-08-27 PROCEDURE — 70551 MRI BRAIN STEM W/O DYE: CPT

## 2024-08-27 PROCEDURE — 85027 COMPLETE CBC AUTOMATED: CPT | Performed by: STUDENT IN AN ORGANIZED HEALTH CARE EDUCATION/TRAINING PROGRAM

## 2024-08-27 PROCEDURE — 80048 BASIC METABOLIC PNL TOTAL CA: CPT | Performed by: STUDENT IN AN ORGANIZED HEALTH CARE EDUCATION/TRAINING PROGRAM

## 2024-08-27 RX ADMIN — ATORVASTATIN CALCIUM 40 MG: 20 TABLET, FILM COATED ORAL at 09:23

## 2024-08-27 RX ADMIN — VENLAFAXINE HYDROCHLORIDE 150 MG: 150 CAPSULE, EXTENDED RELEASE ORAL at 09:25

## 2024-08-27 RX ADMIN — CARBIDOPA AND LEVODOPA 1 TABLET: 25; 100 TABLET ORAL at 18:17

## 2024-08-27 RX ADMIN — CARBIDOPA AND LEVODOPA 1 TABLET: 25; 100 TABLET ORAL at 13:18

## 2024-08-27 RX ADMIN — CARBIDOPA AND LEVODOPA 1 TABLET: 50; 200 TABLET, EXTENDED RELEASE ORAL at 09:23

## 2024-08-27 RX ADMIN — Medication 10 ML: at 22:59

## 2024-08-27 RX ADMIN — LEVOTHYROXINE SODIUM 112 MCG: 112 TABLET ORAL at 04:51

## 2024-08-27 RX ADMIN — CARBIDOPA AND LEVODOPA 1 TABLET: 50; 200 TABLET, EXTENDED RELEASE ORAL at 22:59

## 2024-08-27 RX ADMIN — PANTOPRAZOLE SODIUM 40 MG: 40 TABLET, DELAYED RELEASE ORAL at 13:18

## 2024-08-27 RX ADMIN — METOPROLOL TARTRATE 25 MG: 25 TABLET, FILM COATED ORAL at 09:23

## 2024-08-27 RX ADMIN — RIVAROXABAN 20 MG: 20 TABLET, FILM COATED ORAL at 09:24

## 2024-08-27 RX ADMIN — CARBIDOPA AND LEVODOPA 1 TABLET: 25; 100 TABLET ORAL at 04:51

## 2024-08-27 RX ADMIN — CARBIDOPA AND LEVODOPA 1 TABLET: 25; 100 TABLET ORAL at 09:23

## 2024-08-27 RX ADMIN — ASPIRIN 81 MG: 81 TABLET, COATED ORAL at 09:23

## 2024-08-27 RX ADMIN — TRAZODONE HYDROCHLORIDE 50 MG: 50 TABLET ORAL at 21:46

## 2024-08-27 RX ADMIN — CARBIDOPA AND LEVODOPA 1 TABLET: 25; 100 TABLET ORAL at 16:15

## 2024-08-27 RX ADMIN — METOPROLOL TARTRATE 25 MG: 25 TABLET, FILM COATED ORAL at 21:46

## 2024-08-27 NOTE — SIGNIFICANT NOTE
08/27/24 1307   OTHER   Discipline speech language pathologist   Rehab Time/Intention   Session Not Performed other (see comments)  (Notes MRI negative, diet initated. SLP to s/o, please re-consult as warranted.)        Problem: Pain Acute  Goal: Acceptable Pain Control and Functional Ability  Outcome: Progressing  Intervention: Prevent or Manage Pain  Recent Flowsheet Documentation  Taken 10/14/2022 0100 by Leisa Bonds, RN  Medication Review/Management: medications reviewed  Intervention: Optimize Psychosocial Wellbeing  Recent Flowsheet Documentation  Taken 10/14/2022 0100 by Leisa Bonds, RN  Supportive Measures: active listening utilized   Patient complained of hemorrhoidal pain at midnight with a 3/10 rate. Prn tylenol given per request. He states that it helped. He asked not to be disturbed at night. Slept well 7-8 hrs. He had a medium loose bloody/brown stool at 0600. Patient states the blood was from his hemorrhoids. Will continue to monitor.

## 2024-08-27 NOTE — CONSULTS
DOS: 2024  NAME: Darshan Bhatt   : 1945  PCP: Blanca Hooper MD  CC: Left-sided weakness with facial droop/drooling  Referring MD: Kemar Paz MD    Neurological Problem and Interval History:  78 y.o.  male with a known history of hyperlipidemia, A-fib (on Xarelto), CAD, Parkinson's disease, BPH, thyroid disease, social EtOH, former tobacco abuse, [place] Parkinson White syndrome and suprapubic catheter presented to [facility]  from nursing facility due to complaint of left-sided weakness with facial droop and drooling for which our service was consulted.  Patient reportedly had carbidopa levodopa decreased although  prior dose unclear currently on carbidopa levodopa 25/105 times daily and carbidopa levodopa extended release 50/200mg twice daily.  Symptoms reportedly resolved prior to EMS arrival-on exam patient has no left-sided weakness.    Since admission, patient had CT of the head which was negative MRI of the brain showed no changes when compared to prior imaging; torturous right vert and right PICA noted.  Labs: A1c 6.0, lipid panel HDL 49, LDL 38, mag 2.0, BMP creatinine 1.40, BUN 31, GFR 50.8, CBC bland.  Neurologically, patient reports he feels at/near neurologic baseline.  Tone of voice very low rigidity bilateral upper and lower extremities-no evidence of tremor.         Past Medical/Surgical Hx:  Past Medical History:   Diagnosis Date    A-fib     Allergic     BPH (benign prostatic hyperplasia)     CAD (coronary artery disease)     Cancer     skin    Erectile dysfunction     Hyperlipidemia     Hypogonadism in male     Hypothyroidism     Movement disorder     Parkinson's disease     Sleep apnea     Thyroid disease      Past Surgical History:   Procedure Laterality Date    CARDIAC CATHETERIZATION      COLONOSCOPY      INGUINAL HERNIA REPAIR      PROSTATE SURGERY      SKIN CANCER DESTRUCTION      SUPRAPUBIC TUBE PLACEMENT N/A 2020    Procedure: CYSTOSCOPY SUPRAPUBIC  CATHETER PLACEMENT;  Surgeon: Linwood Cox MD;  Location: [facility] MAIN OR;  Service: Urology;  Laterality: N/A;    THYROIDECTOMY, PARTIAL         Review of Systems:        A complete review of all systems is negative except as described above.     Medications On Admission  (Not in a hospital admission)      Allergies:  Allergies   Allergen Reactions    Meloxicam Nausea Only       Social Hx:  Social History     Socioeconomic History    Marital status:    Tobacco Use    Smoking status: Former     Passive exposure: Past    Smokeless tobacco: Former   Vaping Use    Vaping status: Never Used   Substance and Sexual Activity    Alcohol use: Not Currently     Comment: 2-3 / week  beer or bourbon    Drug use: Never    Sexual activity: Defer       Family Hx:  Family History   Problem Relation Age of Onset    Heart disease Mother     Diabetes Father     Diabetes Paternal Grandmother        Review of Imaging (Interpretation of images not reports):  Imaging discussed above reviewed independently    Laboratory Results:   Lab Results   Component Value Date    GLUCOSE 90 08/27/2024    CALCIUM 9.1 08/27/2024     08/27/2024    K 4.2 08/27/2024    CO2 29.9 (H) 08/27/2024     08/27/2024    BUN 27 (H) 08/27/2024    CREATININE 1.33 (H) 08/27/2024    EGFRIFAFRI >60 03/14/2023    EGFRIFNONA 54 (L) 05/25/2021    BCR 20.3 08/27/2024    ANIONGAP 8.1 08/27/2024     Lab Results   Component Value Date    WBC 5.63 08/27/2024    HGB 12.0 (L) 08/27/2024    HCT 37.5 08/27/2024    MCV 93.5 08/27/2024     (L) 08/27/2024     Lab Results   Component Value Date    CHOL 99 08/27/2024     Lab Results   Component Value Date    HDL 49 08/27/2024    HDL 58 06/21/2024    HDL 56 05/10/2023     Lab Results   Component Value Date    LDL 38 08/27/2024    LDL 44 06/21/2024    LDL 39 05/10/2023     Lab Results   Component Value Date    TRIG 48 08/27/2024    TRIG 61 06/21/2024    TRIG 56 05/10/2023     Lab Results   Component Value  "Date    HGBA1C 6.00 (H) 08/27/2024     Lab Results   Component Value Date    INR 1.1 08/20/2024    INR 1.8 02/06/2024    INR 1.0 12/02/2021    PROTIME 12.3 08/20/2024    PROTIME 20.7 (H) 02/06/2024    PROTIME 11.8 12/02/2021         Physical Examination:  /92   Pulse 75   Temp 97.6 °F (36.4 °C)   Resp 18   Ht 182.9 cm (72.01\")   Wt 88.6 kg (195 lb 5.2 oz)   SpO2 98%   BMI 26.48 kg/m²   General Appearance:   Well developed, well nourished, well groomed, alert, and cooperative.  HEENT:   Normocephalic.    Neck and Spine:  Normal range of motion.  Normal alignment. No mass or tenderness. No bruits.  Cardiac: Regular rate and rhythm. No murmurs.  Peripheral Vasculature: Radial and pedal pulses are equal and symmetric.   Extremities:    No edema or deformities. Normal joint ROM.   Skin:    No rashes or birth marks.    Neurological examination:  Higher Integrative  Function:   Oriented to time, place and person. Normal registration, recall, attention span and concentration. Normal language including comprehension, spontaneous speech, repetition, reading, writing, naming and vocabulary. No neglect with normal visual-spatial function and construction. Normal fund of knowledge and higher integrative function.  CN II:    Pupils are equal, round, and reactive to light. Normal visual acuity and visual fields.    CN III IV VI:   Extraocular movements are full without nystagmus.   CN V:    Normal facial sensation and strength of muscles of mastication.  CN VII:   Facial movements are symmetric. No weakness.  CN VIII:   Auditory acuity is normal.  CN IX & X:   Symmetric palatal movement.  CN XI:    Sternocleidomastoid and trapezius are normal.  No weakness.  CN XII:   The tongue is midline.  No atrophy or fasciculations.  Motor:    Normal muscle strength, bulk and tone in upper extremities.  Lower extremity 4 -/5.  Bilateral upper and lower extremities lower extremity slightly greater than  Sensation:   Normal to " light touch in arms and legs.   Coordination:  Finger-to-nose test shows no dysmetria.        Diagnoses:   1.  Acute left-sided weakness and facial droop; resolved prior to arrival and symptoms have not returned-MRI of the brain reassuring/negative for acute findings specifically no acute infarct/mass/hemorrhage.  Will recommend carotid ultrasound if unremarkable our service will sign off and see again as needed.  Prior to arrival patient on Xarelto, aspirin and Lipitor prior to arrival  2.  Parkinson's disease; on both immediate release and controlled release carbidopa levodopa  3.  A-fib; fully anticoagulated on Xarelto  4.  Hyperlipidemia; on statin      Plan:  Consider urinalysis given presence of suprapubic catheter/recurrent UTI-defer to primary  Carotid ultrasound; if unremarkable our service will sign off and see again as needed  Continue carbidopa levodopa as written  Xarelto as written  Follow-up with neurology in outpatient setting in the near future    Case reviewed with and patient seen in conjunction with attending neurologist ROSEMARIE Leon      08/28/24  Addenda:   Carotid duplex shows less than 50% bilateral ICA stenosis.  No change in current plan.      No further neurology workup indicated. We will sign off and see again upon request.    ROSEMARIE Mathews

## 2024-08-27 NOTE — THERAPY EVALUATION
Patient Name: Darshan Bhatt  : 1945    MRN: [id number]                              Today's Date: 2024       Admit Date: 2024    Visit Dx:     ICD-10-CM ICD-9-CM   1. Acute left-sided weakness  R53.1 728.87   2. History of Parkinson's disease  Z86.69 V12.49   3. History of atrial fibrillation  Z86.79 V12.59     Patient Active Problem List   Diagnosis    PAF (paroxysmal atrial fibrillation)    Anxiety    Coronary arteriosclerosis in native artery    Benign prostatic hyperplasia    Depression    Dizziness    Hemorrhoids    Hyperlipidemia    Hypothyroidism    Malignant melanoma of skin    Sleep apnea    Vitamin B12 deficiency    Angiodysplasia of colon without bleeding    Chronic anticoagulation    IFG (impaired fasting glucose)    Orthostatic hypotension    Parkinson disease    BPH with urinary obstruction    Urinary retention    Left-sided weakness    Dementia without behavioral disturbance    WPW (Sammy-Parkinson-White syndrome)     Past Medical History:   Diagnosis Date    A-fib     Allergic     BPH (benign prostatic hyperplasia)     CAD (coronary artery disease)     Cancer     skin    Erectile dysfunction     Hyperlipidemia     Hypogonadism in male     Hypothyroidism     Movement disorder     Parkinson's disease     Sleep apnea     Thyroid disease      Past Surgical History:   Procedure Laterality Date    CARDIAC CATHETERIZATION      COLONOSCOPY      INGUINAL HERNIA REPAIR      PROSTATE SURGERY      SKIN CANCER DESTRUCTION      SUPRAPUBIC TUBE PLACEMENT N/A 2020    Procedure: CYSTOSCOPY SUPRAPUBIC CATHETER PLACEMENT;  Surgeon: Linwood Cox MD;  Location: [facility];  Service: Urology;  Laterality: N/A;    THYROIDECTOMY, PARTIAL        General Information       Row Name 24 1057          Physical Therapy Time and Intention    Document Type evaluation  -CB     Mode of Treatment co-treatment;physical therapy;occupational therapy;other (see comments)  -CB       Row Name 24 4368           General Information    Patient Profile Reviewed yes  -CB     Prior Level of Function --  pt reports he ambulates using rollator at facility within room and down to the dining room  -CB     Existing Precautions/Restrictions hip  -CB     Barriers to Rehab cognitive status  -CB       Row Name 08/27/24 1051          Living Environment    People in Home facility resident  -CB       Row Name 08/27/24 1051          Cognition    Orientation Status (Cognition) oriented x 3  -CB       Row Name 08/27/24 1051          Safety Issues, Functional Mobility    Impairments Affecting Function (Mobility) endurance/activity tolerance;strength  -CB     Comment, Safety Issues/Impairments (Mobility) Co treatment medically appropriate and necessary due to patient acuity level, activity tolerance and safety of patient and staff. Evaluation established to achieve all goals in POC.  -CB               User Key  (r) = Recorded By, (t) = Taken By, (c) = Cosigned By      Initials Name Provider Type    Sue Dhaliwal, PT Physical Therapist                   Mobility       Row Name 08/27/24 1053          Bed Mobility    Bed Mobility supine-sit;sit-supine  -CB     Supine-Sit [place] (Bed Mobility) standby assist  -CB     Sit-Supine [place] (Bed Mobility) standby assist  -CB     Assistive Device (Bed Mobility) head of bed elevated  -CB     Comment, (Bed Mobility) extended time to complete  -CB       Row Name 08/27/24 1053          Sit-Stand Transfer    Sit-Stand [place] (Transfers) moderate assist (50% patient effort);2 person assist;verbal cues  -CB     Assistive Device (Sit-Stand Transfers) walker, front-wheeled  -CB     Comment, (Sit-Stand Transfer) x2 STS Reps to rwx with VC for UE placement  -CB       Row Name 08/27/24 1053          Gait/Stairs (Locomotion)    [place] Level (Gait) contact guard;verbal cues  -CB     Assistive Device (Gait) walker, front-wheeled  -CB     Distance in Feet (Gait) 50  -CB      Deviations/Abnormal Patterns (Gait) gait speed decreased  -CB     Bilateral Gait Deviations forward flexed posture  -CB     Comment, (Gait/Stairs) no overt LOB or unsteadiness noted  -CB               User Key  (r) = Recorded By, (t) = Taken By, (c) = Cosigned By      Initials Name Provider Type    CB Sue Ferrera PT Physical Therapist                   Obj/Interventions       [facility] Name 08/27/24 1055          Range of Motion Comprehensive    General Range of Motion bilateral lower extremity ROM WFL  -CB       [facility] Name 08/27/24 1055          Strength Comprehensive (MMT)    General Manual Muscle Testing (MMT) Assessment lower extremity strength deficits identified  -CB       [facility] Name 08/27/24 1055          Balance    Balance Assessment standing static balance;standing dynamic balance;sitting dynamic balance;sitting static balance  -CB     Static Sitting Balance standby assist;verbal cues  -CB     Dynamic Sitting Balance contact guard;verbal cues  -CB     Position, Sitting Balance sitting edge of bed  -CB     Static Standing Balance contact guard;verbal cues  -CB     Dynamic Standing Balance contact guard;verbal cues  -CB     Position/Device Used, Standing Balance supported;walker, front-wheeled  -CB     Balance Interventions sitting;standing;sit to stand;supported;static;dynamic;minimal challenge  -CB               User Key  (r) = Recorded By, (t) = Taken By, (c) = Cosigned By      Initials Name Provider Type    Sue Dhaliwal PT Physical Therapist                   Goals/Plan       Row Name 08/27/24 1056          Bed Mobility Goal 1 (PT)    Activity/Assistive Device (Bed Mobility Goal 1, PT) bed mobility activities, all  -CB     [place] Level/Cues Needed (Bed Mobility Goal 1, PT) modified independence  -CB     Time Frame (Bed Mobility Goal 1, PT) long term goal (LTG);1 week  -CB       Row Name 08/27/24 1056          Transfer Goal 1 (PT)    Activity/Assistive Device (Transfer Goal 1, PT)  sit-to-stand/stand-to-sit;bed-to-chair/chair-to-bed;walker, rolling  -CB     [place] Level/Cues Needed (Transfer Goal 1, PT) standby assist  -CB     Time Frame (Transfer Goal 1, PT) long term goal (LTG);1 week  -CB       Row Name 08/27/24 1056          Gait Training Goal 1 (PT)    Activity/Assistive Device (Gait Training Goal 1, PT) gait (walking locomotion);assistive device use;increase endurance/gait distance  -CB     [place] Level (Gait Training Goal 1, PT) standby assist  -CB     Distance (Gait Training Goal 1, PT) 150ft  -CB     Time Frame (Gait Training Goal 1, PT) long term goal (LTG);1 week  -CB       Row Name 08/27/24 1056          Therapy Assessment/Plan (PT)    Planned Therapy Interventions (PT) balance training;bed mobility training;gait training;home exercise program;patient/family education;transfer training;strengthening  -CB               User Key  (r) = Recorded By, (t) = Taken By, (c) = Cosigned By      Initials Name Provider Type    Sue Dhaliwal, PT Physical Therapist                   Clinical Impression       Row Name 08/27/24 1056          Pain    Pretreatment Pain Rating 0/10 - no pain  -CB     Posttreatment Pain Rating 0/10 - no pain  -CB       Row Name 08/27/24 1056          Plan of Care Review    Plan of Care Reviewed With patient  -CB     Outcome Evaluation Patient is an 78 y.o. male admitted to [facility] for left sided weakness on 8/26/2024. CT and MRI (-) for acute findings.  PMHx includes HTN, CKD, dementia. Patient lives in nursing facility and reports he uses rollator at baseline to ambulate. He reports he ambulates in room and down to the dining room. Today, patient performed bed mobility with SBA (extended time), required modAx2 for multiple STS, and ambulated 50ft using rwx requiring CGA.  Generalized weakness, activity tolerance, and overall functional decline from baseline noted. Patient may benefit from skilled PT services to address functional deficits and improve  level of independence prior to discharge. Anticipate return to NH with PT upon DC.  -CB       Row Name 08/27/24 1056          Therapy Assessment/Plan (PT)    Rehab Potential (PT) good, to achieve stated therapy goals  -CB     Criteria for Skilled Interventions Met (PT) yes  -CB     Therapy Frequency (PT) 5 times/wk  -CB       Row Name 08/27/24 1056          Positioning and Restraints    Pre-Treatment Position in bed  -CB     Post Treatment Position bed  -CB     In Bed notified nsg;fowlers;call light within reach;encouraged to call for assist;exit alarm on;side rails up x3  -CB               User Key  (r) = Recorded By, (t) = Taken By, (c) = Cosigned By      Initials Name Provider Type    CB Sue Ferrera PT Physical Therapist                   Outcome Measures       Row Name 08/27/24 1057 08/27/24 0800       How much help from another person do you currently need...    Turning from your back to your side while in flat bed without using bedrails? 3  -CB 2  -CT    Moving from lying on back to sitting on the side of a flat bed without bedrails? 3  -CB 2  -CT    Moving to and from a bed to a chair (including a wheelchair)? 3  -CB 2  -CT    Standing up from a chair using your arms (e.g., wheelchair, bedside chair)? 2  -CB 2  -CT    Climbing 3-5 steps with a railing? 2  -CB 2  -CT    To walk in hospital room? 3  -CB 2  -CT    AM-PAC 6 Clicks Score (PT) 16  -CB 12  -CT    Highest Level of Mobility Goal 5 --> Static standing  -CB 4 --> Transfer to chair/commode  -CT      Row Name 08/27/24 1057          Functional Assessment    Outcome Measure Options AM-PAC 6 Clicks Basic Mobility (PT)  -CB               User Key  (r) = Recorded By, (t) = Taken By, (c) = Cosigned By      Initials Name Provider Type    CT Luz Lynch, RN Registered Nurse    Sue Dhaliwal, PT Physical Therapist                                 Physical Therapy Education       Title: PT OT SLP Therapies (In Progress)       Topic: Physical Therapy  (In Progress)       Point: Mobility training (Done)       Learning Progress Summary             Patient Acceptance, E,TB, VU,NR by  at 8/27/2024 1058                         Point: Home exercise program (Not Started)       Learner Progress:  Not documented in this visit.              Point: Body mechanics (Done)       Learning Progress Summary             Patient Acceptance, E,TB, VU,NR by CB at 8/27/2024 1058                         Point: Precautions (Done)       Learning Progress Summary             Patient Acceptance, E,TB, VU,NR by  at 8/27/2024 1058                                         User Key       Initials Effective Dates Name Provider Type Discipline     10/22/21 -  Sue Ferrera, PT Physical Therapist PT                  PT Recommendation and Plan  Planned Therapy Interventions (PT): balance training, bed mobility training, gait training, home exercise program, patient/family education, transfer training, strengthening  Plan of Care Reviewed With: patient  Outcome Evaluation: Patient is an 78 y.o. male admitted to [facility] for left sided weakness on 8/26/2024. CT and MRI (-) for acute findings.  PMHx includes HTN, CKD, dementia. Patient lives in nursing facility and reports he uses rollator at baseline to ambulate. He reports he ambulates in room and down to the dining room. Today, patient performed bed mobility with SBA (extended time), required modAx2 for multiple STS, and ambulated 50ft using rwx requiring CGA.  Generalized weakness, activity tolerance, and overall functional decline from baseline noted. Patient may benefit from skilled PT services to address functional deficits and improve level of independence prior to discharge. Anticipate return to NH with PT upon DC.     Time Calculation:         PT Charges       Row Name 08/27/24 1101             Time Calculation    Start Time 0959  -CB      Stop Time 1021  -CB      Time Calculation (min) 22 min  -CB      PT Received On 08/27/24  -      PT  - Next Appointment 08/28/24  -CB      PT Goal Re-Cert Due Date 09/03/24  -CB         Time Calculation- PT    Total Timed Code Minutes- PT 10 minute(s)  -CB         Timed Charges    36798 - PT Therapeutic Activity Minutes 10  -CB         Total Minutes    Timed Charges Total Minutes 10  -CB       Total Minutes 10  -CB                User Key  (r) = Recorded By, (t) = Taken By, (c) = Cosigned By      Initials Name Provider Type    CB Sue Ferrera, PT Physical Therapist                  Therapy Charges for Today       Code Description Service Date Service Provider Modifiers Qty    75620784647 HC PT THERAPEUTIC ACT EA 15 MIN 8/27/2024 Sue Ferrera, PT GP 1    65115458332 HC PT EVAL MOD COMPLEXITY 3 8/27/2024 Sue Ferrera, PT GP 1            PT G-Codes  Outcome Measure Options: AM-PAC 6 Clicks Basic Mobility (PT)  AM-PAC 6 Clicks Score (PT): 16  PT Discharge Summary  Anticipated Discharge Disposition (PT):  (return to facility and PT services)    Sue Ferrera PT  8/27/2024

## 2024-08-27 NOTE — THERAPY EVALUATION
Patient Name: aDrshan Bhatt  : 1945    MRN: [id number]                              Today's Date: 2024       Admit Date: 2024    Visit Dx:     ICD-10-CM ICD-9-CM   1. Acute left-sided weakness  R53.1 728.87   2. History of Parkinson's disease  Z86.69 V12.49   3. History of atrial fibrillation  Z86.79 V12.59     Patient Active Problem List   Diagnosis    PAF (paroxysmal atrial fibrillation)    Anxiety    Coronary arteriosclerosis in native artery    Benign prostatic hyperplasia    Depression    Dizziness    Hemorrhoids    Hyperlipidemia    Hypothyroidism    Malignant melanoma of skin    Sleep apnea    Vitamin B12 deficiency    Angiodysplasia of colon without bleeding    Chronic anticoagulation    IFG (impaired fasting glucose)    Orthostatic hypotension    Parkinson disease    BPH with urinary obstruction    Urinary retention    Left-sided weakness    Dementia without behavioral disturbance    WPW (Sammy-Parkinson-White syndrome)     Past Medical History:   Diagnosis Date    A-fib     Allergic     BPH (benign prostatic hyperplasia)     CAD (coronary artery disease)     Cancer     skin    Erectile dysfunction     Hyperlipidemia     Hypogonadism in male     Hypothyroidism     Movement disorder     Parkinson's disease     Sleep apnea     Thyroid disease      Past Surgical History:   Procedure Laterality Date    CARDIAC CATHETERIZATION      COLONOSCOPY      INGUINAL HERNIA REPAIR      PROSTATE SURGERY      SKIN CANCER DESTRUCTION      SUPRAPUBIC TUBE PLACEMENT N/A 2020    Procedure: CYSTOSCOPY SUPRAPUBIC CATHETER PLACEMENT;  Surgeon: Linwood Cox MD;  Location: [facility];  Service: Urology;  Laterality: N/A;    THYROIDECTOMY, PARTIAL        General Information       Row Name 24 1128          OT Time and Intention    Document Type evaluation  -KR     Mode of Treatment co-treatment;physical therapy;occupational therapy  -KR       Row Name 24 1128          General Information     Patient Profile Reviewed yes  -KR     Prior Level of Function --  rollator for mobility. States staff assists with LBD  -KR     Existing Precautions/Restrictions fall  -KR     Barriers to Rehab cognitive status  -KR       Row Name 08/27/24 1128          Living Environment    People in Home facility resident  -KR       Row Name 08/27/24 1128          Cognition    Orientation Status (Cognition) oriented x 3  -KR       Row Name 08/27/24 1128          Safety Issues, Functional Mobility    Impairments Affecting Function (Mobility) endurance/activity tolerance;strength  -KR     Comment, Safety Issues/Impairments (Mobility) [place] medically appropriate and necessary due to pt acuity, activity tolerance, to maximize mobility efforts and safety of pt and staff. Focus on progression of care and goals established in plan of care.  -KR               User Key  (r) = Recorded By, (t) = Taken By, (c) = Cosigned By      Initials Name Provider Type    Renetta Pak OT Occupational Therapist                     Mobility/ADL's       Row Name 08/27/24 1129          Bed Mobility    Supine-Sit [place] (Bed Mobility) standby assist  -KR     Sit-Supine [place] (Bed Mobility) standby assist  -KR     Assistive Device (Bed Mobility) head of bed elevated  -KR     Comment, (Bed Mobility) Increased time to complete  -KR       Row Name 08/27/24 1129          Transfers    Transfers sit-stand transfer  -KR       Row Name 08/27/24 1129          Sit-Stand Transfer    Sit-Stand [place] (Transfers) moderate assist (50% patient effort);2 person assist;verbal cues  -KR     Assistive Device (Sit-Stand Transfers) walker, front-wheeled  -KR     Comment, (Sit-Stand Transfer) x2 from EOB  -KR       Row Name 08/27/24 1129          Functional Mobility    Functional Mobility- Ind. Level contact guard assist;1 person  -KR     Functional Mobility- Device walker, front-wheeled  -KR     Functional Mobility-Distance (Feet) --  around nsg unit and  back to bed with rwx  -KR       Row Name 08/27/24 1129          Activities of Daily Living    BADL Assessment/Intervention lower body dressing  -KR       Row Name 08/27/24 1129          Lower Body Dressing Assessment/Training    Comment, (Lower Body Dressing) max A for socks and then pt able to slide feet into shoes  -KR               User Key  (r) = Recorded By, (t) = Taken By, (c) = Cosigned By      Initials Name Provider Type    Renetta Pak OT Occupational Therapist                   Obj/Interventions       Row Name 08/27/24 1130          Range of Motion Comprehensive    General Range of Motion bilateral upper extremity ROM WFL  -KR       Row Name 08/27/24 1130          Strength Comprehensive (MMT)    General Manual Muscle Testing (MMT) Assessment upper extremity strength deficits identified  -KR       [facility] Name 08/27/24 1130          Balance    Static Sitting Balance standby assist  -KR     Dynamic Sitting Balance contact guard  -KR     Position, Sitting Balance sitting edge of bed  -KR     Static Standing Balance contact guard;verbal cues  -KR     Dynamic Standing Balance contact guard;verbal cues  -KR     Position/Device Used, Standing Balance supported;walker, front-wheeled  -KR     Balance Interventions sitting;standing;occupation based/functional task  -KR               User Key  (r) = Recorded By, (t) = Taken By, (c) = Cosigned By      Initials Name Provider Type    Renetta Pak OT Occupational Therapist                   Goals/Plan    No documentation.                  Clinical Impression       Row Name 08/27/24 1126          Pain Assessment    Pretreatment Pain Rating 0/10 - no pain  -KR     Posttreatment Pain Rating 0/10 - no pain  -KR       Row Name 08/27/24 1126          Plan of Care Review    Plan of Care Reviewed With patient  -KR     Outcome Evaluation Pt seen for OT manuela this AM. Admitted from facility with sudden onset of being unable to move is L side. Symptoms have now resolved and he  is able to move all 4 extremities with equal  strength. PMHx includes Parkinson's and dementia. He reports using a rollator for all mobility and states he required min A for LBD and was independent with toileting and UBD at his facility. Today he performed bed mobility with SBA and increased time. Required assist for socks and able to slid his feet into his shoes with increased time and min A for the R. He did required mod Ax2 for STS from EOB. Progressed once he was up and walking with use of rwx and CGA. Pt to continue to benefit from skilled OT to address deficits. Anticipate return to his facility at [place].  -       Row Name 08/27/24 1126          Therapy Assessment/Plan (OT)    Rehab Potential (OT) good, to achieve stated therapy goals  -KR     Criteria for Skilled Therapeutic Interventions Met (OT) yes  -KR     Therapy Frequency (OT) 5 times/wk  -KR       Row Name 08/27/24 1126          Therapy Plan Review/Discharge Plan (OT)    Anticipated Discharge Disposition (OT) --  return to facility  -       Row Name 08/27/24 1126          Vital Signs    Pre Patient Position Supine  -KR     Intra Patient Position Standing  -KR     Post Patient Position Supine  -KR       Row Name 08/27/24 1126          Positioning and Restraints    Pre-Treatment Position in bed  -KR     Post Treatment Position bed  -KR     In Bed notified nsg;supine;call light within reach;encouraged to call for assist;exit alarm on  -KR               User Key  (r) = Recorded By, (t) = Taken By, (c) = Cosigned By      Initials Name Provider Type    Renetta Pak, OT Occupational Therapist                   Outcome Measures       Row Name 08/27/24 1057 08/27/24 0800       How much help from another person do you currently need...    Turning from your back to your side while in flat bed without using bedrails? 3  -CB 2  -CT    Moving from lying on back to sitting on the side of a flat bed without bedrails? 3  -CB 2  -CT    Moving to and from a bed  to a chair (including a wheelchair)? 3  -CB 2  -CT    Standing up from a chair using your arms (e.g., wheelchair, bedside chair)? 2  -CB 2  -CT    Climbing 3-5 steps with a railing? 2  -CB 2  -CT    To walk in hospital room? 3  -CB 2  -CT    AM-PAC 6 Clicks Score (PT) 16  -CB 12  -CT    Highest Level of Mobility Goal 5 --> Static standing  -CB 4 --> Transfer to chair/commode  -CT      Row Name 08/27/24 1057          Functional Assessment    Outcome Measure Options AM-PAC 6 Clicks Basic Mobility (PT)  -CB               User Key  (r) = Recorded By, (t) = Taken By, (c) = Cosigned By      Initials Name Provider Type    CT Luz Lynch, RN Registered Nurse    Sue Dhaliwal, PT Physical Therapist                      OT Recommendation and Plan  Therapy Frequency (OT): 5 times/wk  Plan of Care Review  Plan of Care Reviewed With: patient  Outcome Evaluation: Pt seen for OT eval this AM. Admitted from facility with sudden onset of being unable to move is L side. Symptoms have now resolved and he is able to move all 4 extremities with equal  strength. PMHx includes Parkinson's and dementia. He reports using a rollator for all mobility and states he required min A for LBD and was independent with toileting and UBD at his facility. Today he performed bed mobility with SBA and increased time. Required assist for socks and able to slid his feet into his shoes with increased time and min A for the R. He did required mod Ax2 for STS from EOB. Progressed once he was up and walking with use of rwx and CGA. Pt to continue to benefit from skilled OT to address deficits. Anticipate return to his facility at [place].     Time Calculation:         Time Calculation- OT       Row Name 08/27/24 1128             Time Calculation- OT    OT Start Time 0959  -KR      OT Stop Time 1021  -KR      OT Time Calculation (min) 22 min  -KR      OT Received On 08/27/24  -KR      OT - Next Appointment 08/28/24  -KR      OT Goal Re-Cert Due Date  09/10/24  -KR         Timed Charges    74261 - OT Therapeutic Activity Minutes 12  -KR         Untimed Charges    OT Eval/Re-eval Minutes 10  -KR         Total Minutes    Timed Charges Total Minutes 12  -KR      Untimed Charges Total Minutes 10  -KR       Total Minutes 22  -KR                User Key  (r) = Recorded By, (t) = Taken By, (c) = Cosigned By      Initials Name Provider Type    Renetta Pak OT Occupational Therapist                  Therapy Charges for Today       Code Description Service Date Service Provider Modifiers Qty    61628253072  OT THERAPEUTIC ACT EA 15 MIN 8/27/2024 Renetta Rodas OT GO 1    48437777880 HC OT EVAL MOD COMPLEXITY 3 8/27/2024 Renetta Rodas OT GO 1                 Renetta Rodas OT  8/27/2024

## 2024-08-27 NOTE — PLAN OF CARE
Goal Outcome Evaluation:  Plan of Care Reviewed With: patient        Progress: improving  Outcome Evaluation: vss, no complaints of pain. pt  AxOx4 NIH 0. IVF infusing, MRI screening sheet sent. pt rested well during shift. labs in am.

## 2024-08-27 NOTE — PLAN OF CARE
Goal Outcome Evaluation:  Plan of Care Reviewed With: patient              Patient is an 78 y.o. male admitted to [facility] for left sided weakness on 8/26/2024. CT and MRI (-) for acute findings.  PMHx includes HTN, CKD, dementia. Patient lives in nursing facility and reports he uses rollator at baseline to ambulate. He reports he ambulates in room and down to the dining room. Today, patient performed bed mobility with SBA (extended time), required modAx2 for multiple STS, and ambulated 50ft using rwx requiring CGA.  Generalized weakness, activity tolerance, and overall functional decline from baseline noted. Patient may benefit from skilled PT services to address functional deficits and improve level of independence prior to discharge. Anticipate return to NH with PT upon DC.      Anticipated Discharge Disposition (PT):  (return to facility and PT services)

## 2024-08-27 NOTE — PROGRESS NOTES
Name: Darshan Bhatt ADMIT: 2024   : 1945  PCP: Blanca Hooper MD    MRN: [id number] LOS: 0 days   AGE/SEX: 78 y.o. male  ROOM:      Subjective   Subjective   No acute events overnight.  Patient oriented to time and place today but not oriented to situation.  No family at bedside.  Discussed with nursing.    Objective   Objective     Vital Signs  Temp:  [97.4 °F (36.3 °C)-98.1 °F (36.7 °C)] 97.6 °F (36.4 °C)  Heart Rate:  [74-90] 75  Resp:  [18] 18  BP: (105-184)/() 134/92  SpO2:  [97 %-98 %] 98 %  on   ;   Device (Oxygen Therapy): room air  Body mass index is 26.48 kg/m².    Physical Exam  General: Alert, no acute distress.  Elderly and frail-appearing.  Disoriented.  ENT: No conjunctival injection or scleral icterus. Moist mucous membranes.   Neuro: Eyes open and moving in all directions, generalized weakness, no focal deficits.   Lungs: Clear to auscultation bilaterally. No wheeze or crackles. No distress.   Heart: RRR, no murmurs. No edema.  Abdomen: Soft, non-tender, non-distended. Normal bowel sounds.   Ext: Warm and well-perfused. No edema.   Skin: No rashes or lesions. IV site without swelling or erythema.     Results Review     I reviewed the patient's new clinical results:  Results from last 7 days   Lab Units 24  0314 24  1615 24  1544   WBC 10*3/mm3 5.63 5.58 7.55   HEMOGLOBIN g/dL 12.0* 12.8* 12.2*   PLATELETS 10*3/mm3 139* 147 148     Results from last 7 days   Lab Units 24  0314 24  1615   SODIUM mmol/L 140 141   POTASSIUM mmol/L 4.2 4.7   CHLORIDE mmol/L 102 101   CO2 mmol/L 29.9* 28.0   BUN mg/dL 27* 31*   CREATININE mg/dL 1.33* 1.40*   GLUCOSE mg/dL 90 88   EGFR mL/min/1.73 54.0* 50.8*       Results from last 7 days   Lab Units 24  0314 24  1615 24  1544   CALCIUM mg/dL 9.1 9.5  --    MAGNESIUM mg/dL  --  2.0 1.7       Hemoglobin A1C   Date/Time Value Ref Range Status   2024 6.00 (H) 4.80 - 5.60 % Final       MRI  Brain Without Contrast    Result Date: 8/27/2024   No evidence for acute intracranial pathology.  Mild changes of chronic small vessel ischemic phenomenon.  There is a tortuous right vertebral artery and a tortuous right PICA that mildly deform the right lateral aspect of the medulla. No significant interval change is seen since the prior study.   This report was finalized on 8/27/2024 9:27 AM by Dr. Lamont Carrero M.D on Workstation: [id number]      CT Head Without Contrast    Result Date: 8/26/2024  No acute process is identified. Further evaluation could be performed with a MRI examination of the brain as indicated.    Radiation dose reduction techniques were utilized, including automated exposure modulation based on body size.  This report was finalized on 8/26/2024 5:30 PM by Dr. Diaz Lynn M.D on Workstation: [id number]       I have personally reviewed all medications:  Scheduled Medications  aspirin, 81 mg, Oral, Daily  atorvastatin, 40 mg, Oral, Daily  carbidopa-levodopa, 1 tablet, Oral, 5x Daily  carbidopa-levodopa CR, 1 tablet, Oral, BID  levothyroxine, 112 mcg, Oral, Q AM  metoprolol tartrate, 25 mg, Oral, BID  [Held by provider] midodrine, 10 mg, Oral, TID AC  pantoprazole, 40 mg, Oral, Daily  rivaroxaban, 20 mg, Oral, Daily  sodium chloride, 500 mL, Intravenous, Once  sodium chloride, 10 mL, Intravenous, Q12H  traZODone, 50 mg, Oral, Nightly  venlafaxine XR, 150 mg, Oral, Daily    Infusions  lactated ringers, 100 mL/hr, Last Rate: 100 mL/hr (08/27/24 1322)    Diet  Diet: Cardiac; Healthy Heart (2-3 Na+); Fluid Consistency: Thin (IDDSI 0)      Intake/Output Summary (Last 24 hours) at 8/27/2024 1422  Last data filed at 8/27/2024 1140  Gross per 24 hour   Intake --   Output 600 ml   Net -600 ml       Assessment/Plan     Active Hospital Problems    Diagnosis  POA    **Left-sided weakness [R53.1]  Yes    Dementia without behavioral disturbance [F03.90]  Unknown    WPW (Sammy-Parkinson-White syndrome)  [I45.6]  Yes    Parkinson disease [G20.A1]  Yes    Chronic anticoagulation [Z79.01]  Not Applicable    PAF (paroxysmal atrial fibrillation) [I48.0]  Yes    Hypothyroidism [E03.9]  Yes    Hyperlipidemia [E78.5]  Yes    Depression [F32.A]  Yes    Coronary arteriosclerosis in native artery [I25.10]  Yes      Resolved Hospital Problems   No resolved problems to display.     78 y.o. male with Left-sided weakness.    Transient left-sided weakness with facial droop  Parkinson disease  Dementia  -Has now resolved.  CT head with no acute findings.  -Neurology consulted  -Continue home Sinemet   -MRI with no acute abnormalities  -Checking carotid dopplers     Hypertension  Hyperlipidemia  Coronary artery disease  Concealed Sammy-Parkinson-White syndrome  Permanent atrial fibrillation with SSS   -Continue aspirin, statin, metoprolol  -Continue Xarelto      CKD 3 A  -Baseline around 1.2, creatinine improved to 1.33 today  -Continue IV fluid  -Repeat BMP with morning lab     Orthostatic hypotension  Supine hypertension  -Hold midodrine.  Continue metoprolol as above.       Hypothyroidism  -Continue home Synthroid     Suprapubic catheter  -Known E. coli colonization    SCDs for DVT prophylaxis.  Full code.  Discussed with patient and family.  Anticipate discharge TBD    Called and spoke to the patients brother, Enrico.  He saw the patient at bedside today.  He feels like he has almost completely returned to his mental baseline.  Plan is to check carotid Dopplers as above.  If those are unremarkable and patient remains lucid, will plan to discharge back to facility tomorrow.    Shaina Sher MD  [facility]  08/27/24  14:22 EDT

## 2024-08-27 NOTE — PLAN OF CARE
Goal Outcome Evaluation:  Plan of Care Reviewed With: patient           Outcome Evaluation: Pt seen for OT manuela this AM. Admitted from facility with sudden onset of being unable to move is L side. Symptoms have now resolved and he is able to move all 4 extremities with equal  strength. PMHx includes Parkinson's and dementia. He reports using a rollator for all mobility and states he required min A for LBD and was independent with toileting and UBD at his facility. Today he performed bed mobility with SBA and increased time. Required assist for socks and able to slid his feet into his shoes with increased time and min A for the R. He did required mod Ax2 for STS from EOB. Progressed once he was up and walking with use of rwx and CGA. Pt to continue to benefit from skilled OT to address deficits. Anticipate return to his facility [place].      Anticipated Discharge Disposition (OT):  (return to facility)

## 2024-08-28 ENCOUNTER — APPOINTMENT (OUTPATIENT)
Dept: CARDIOLOGY | Facility: HOSPITAL | Age: 79
End: 2024-08-28
Payer: MEDICARE

## 2024-08-28 LAB
ANION GAP SERPL CALCULATED.3IONS-SCNC: 9 MMOL/L (ref 5–15)
BH CV XLRA MEAS LEFT DIST CCA EDV: 15.3 CM/SEC
BH CV XLRA MEAS LEFT DIST CCA PSV: 67.4 CM/SEC
BH CV XLRA MEAS LEFT DIST ICA EDV: -22 CM/SEC
BH CV XLRA MEAS LEFT DIST ICA PSV: -62 CM/SEC
BH CV XLRA MEAS LEFT ICA/CCA RATIO: 0.94
BH CV XLRA MEAS LEFT MID ICA EDV: -22.5 CM/SEC
BH CV XLRA MEAS LEFT MID ICA PSV: -63.1 CM/SEC
BH CV XLRA MEAS LEFT PROX CCA EDV: 17.2 CM/SEC
BH CV XLRA MEAS LEFT PROX CCA PSV: 67.4 CM/SEC
BH CV XLRA MEAS LEFT PROX ECA EDV: -17.6 CM/SEC
BH CV XLRA MEAS LEFT PROX ECA PSV: -81.2 CM/SEC
BH CV XLRA MEAS LEFT PROX ICA EDV: -11.5 CM/SEC
BH CV XLRA MEAS LEFT PROX ICA PSV: -55.4 CM/SEC
BH CV XLRA MEAS LEFT PROX SCLA PSV: 115.2 CM/SEC
BH CV XLRA MEAS LEFT VERTEBRAL A EDV: 15.4 CM/SEC
BH CV XLRA MEAS LEFT VERTEBRAL A PSV: 50.5 CM/SEC
BH CV XLRA MEAS RIGHT DIST CCA EDV: 24.7 CM/SEC
BH CV XLRA MEAS RIGHT DIST CCA PSV: 78.5 CM/SEC
BH CV XLRA MEAS RIGHT DIST ICA EDV: -25.5 CM/SEC
BH CV XLRA MEAS RIGHT DIST ICA PSV: -67.7 CM/SEC
BH CV XLRA MEAS RIGHT ICA/CCA RATIO: 0.93
BH CV XLRA MEAS RIGHT MID ICA EDV: -19.5 CM/SEC
BH CV XLRA MEAS RIGHT MID ICA PSV: -54.3 CM/SEC
BH CV XLRA MEAS RIGHT PROX CCA EDV: 28 CM/SEC
BH CV XLRA MEAS RIGHT PROX CCA PSV: 86.2 CM/SEC
BH CV XLRA MEAS RIGHT PROX ECA EDV: -25.9 CM/SEC
BH CV XLRA MEAS RIGHT PROX ECA PSV: -133.9 CM/SEC
BH CV XLRA MEAS RIGHT PROX ICA EDV: -14.3 CM/SEC
BH CV XLRA MEAS RIGHT PROX ICA PSV: -73 CM/SEC
BH CV XLRA MEAS RIGHT PROX SCLA PSV: 152.9 CM/SEC
BH CV XLRA MEAS RIGHT VERTEBRAL A EDV: 10.6 CM/SEC
BH CV XLRA MEAS RIGHT VERTEBRAL A PSV: 36.3 CM/SEC
BUN SERPL-MCNC: 20 MG/DL (ref 8–23)
BUN/CREAT SERPL: 18.5 (ref 7–25)
CALCIUM SPEC-SCNC: 9 MG/DL (ref 8.6–10.5)
CHLORIDE SERPL-SCNC: 103 MMOL/L (ref 98–107)
CO2 SERPL-SCNC: 26 MMOL/L (ref 22–29)
CREAT SERPL-MCNC: 1.08 MG/DL (ref 0.76–1.27)
DEPRECATED RDW RBC AUTO: 41.7 FL (ref 37–54)
EGFRCR SERPLBLD CKD-EPI 2021: 69.4 ML/MIN/1.73
ERYTHROCYTE [DISTWIDTH] IN BLOOD BY AUTOMATED COUNT: 12.3 % (ref 12.3–15.4)
GEN 5 2HR TROPONIN T REFLEX: 28 NG/L
GLUCOSE SERPL-MCNC: 97 MG/DL (ref 65–99)
HCT VFR BLD AUTO: 40 % (ref 37.5–51)
HGB BLD-MCNC: 13 G/DL (ref 13–17.7)
LEFT ARM BP: NORMAL MMHG
MCH RBC QN AUTO: 30.1 PG (ref 26.6–33)
MCHC RBC AUTO-ENTMCNC: 32.5 G/DL (ref 31.5–35.7)
MCV RBC AUTO: 92.6 FL (ref 79–97)
PLATELET # BLD AUTO: 145 10*3/MM3 (ref 140–450)
PMV BLD AUTO: 10.4 FL (ref 6–12)
POTASSIUM SERPL-SCNC: 4.4 MMOL/L (ref 3.5–5.2)
QT INTERVAL: 356 MS
QTC INTERVAL: 414 MS
RBC # BLD AUTO: 4.32 10*6/MM3 (ref 4.14–5.8)
RIGHT ARM BP: NORMAL MMHG
SODIUM SERPL-SCNC: 138 MMOL/L (ref 136–145)
TROPONIN T DELTA: 1 NG/L
TROPONIN T SERPL HS-MCNC: 27 NG/L
TROPONIN T SERPL HS-MCNC: 27 NG/L
WBC NRBC COR # BLD AUTO: 5.49 10*3/MM3 (ref 3.4–10.8)

## 2024-08-28 PROCEDURE — 96361 HYDRATE IV INFUSION ADD-ON: CPT

## 2024-08-28 PROCEDURE — 93880 EXTRACRANIAL BILAT STUDY: CPT

## 2024-08-28 PROCEDURE — G0378 HOSPITAL OBSERVATION PER HR: HCPCS

## 2024-08-28 PROCEDURE — 84484 ASSAY OF TROPONIN QUANT: CPT | Performed by: STUDENT IN AN ORGANIZED HEALTH CARE EDUCATION/TRAINING PROGRAM

## 2024-08-28 PROCEDURE — 80048 BASIC METABOLIC PNL TOTAL CA: CPT | Performed by: STUDENT IN AN ORGANIZED HEALTH CARE EDUCATION/TRAINING PROGRAM

## 2024-08-28 PROCEDURE — 97535 SELF CARE MNGMENT TRAINING: CPT

## 2024-08-28 PROCEDURE — 36415 COLL VENOUS BLD VENIPUNCTURE: CPT | Performed by: STUDENT IN AN ORGANIZED HEALTH CARE EDUCATION/TRAINING PROGRAM

## 2024-08-28 PROCEDURE — 93010 ELECTROCARDIOGRAM REPORT: CPT | Performed by: INTERNAL MEDICINE

## 2024-08-28 PROCEDURE — 85027 COMPLETE CBC AUTOMATED: CPT | Performed by: STUDENT IN AN ORGANIZED HEALTH CARE EDUCATION/TRAINING PROGRAM

## 2024-08-28 PROCEDURE — 93880 EXTRACRANIAL BILAT STUDY: CPT | Performed by: SURGERY

## 2024-08-28 PROCEDURE — 25810000003 LACTATED RINGERS PER 1000 ML: Performed by: STUDENT IN AN ORGANIZED HEALTH CARE EDUCATION/TRAINING PROGRAM

## 2024-08-28 PROCEDURE — 97116 GAIT TRAINING THERAPY: CPT

## 2024-08-28 PROCEDURE — 93005 ELECTROCARDIOGRAM TRACING: CPT | Performed by: STUDENT IN AN ORGANIZED HEALTH CARE EDUCATION/TRAINING PROGRAM

## 2024-08-28 PROCEDURE — 97530 THERAPEUTIC ACTIVITIES: CPT

## 2024-08-28 PROCEDURE — 97110 THERAPEUTIC EXERCISES: CPT

## 2024-08-28 RX ORDER — AMLODIPINE BESYLATE 5 MG/1
2.5 TABLET ORAL
Status: DISCONTINUED | OUTPATIENT
Start: 2024-08-28 | End: 2024-08-29 | Stop reason: HOSPADM

## 2024-08-28 RX ORDER — AMLODIPINE BESYLATE 5 MG/1
2.5 TABLET ORAL DAILY
Qty: 30 TABLET | Refills: 0 | Status: ON HOLD | OUTPATIENT
Start: 2024-08-28 | End: 2024-08-31

## 2024-08-28 RX ADMIN — ATORVASTATIN CALCIUM 40 MG: 20 TABLET, FILM COATED ORAL at 11:11

## 2024-08-28 RX ADMIN — Medication 10 ML: at 10:57

## 2024-08-28 RX ADMIN — CARBIDOPA AND LEVODOPA 1 TABLET: 50; 200 TABLET, EXTENDED RELEASE ORAL at 21:10

## 2024-08-28 RX ADMIN — METOPROLOL TARTRATE 25 MG: 25 TABLET, FILM COATED ORAL at 11:07

## 2024-08-28 RX ADMIN — AMLODIPINE BESYLATE 2.5 MG: 5 TABLET ORAL at 17:50

## 2024-08-28 RX ADMIN — CARBIDOPA AND LEVODOPA 1 TABLET: 25; 100 TABLET ORAL at 10:56

## 2024-08-28 RX ADMIN — CARBIDOPA AND LEVODOPA 1 TABLET: 25; 100 TABLET ORAL at 15:45

## 2024-08-28 RX ADMIN — CARBIDOPA AND LEVODOPA 1 TABLET: 25; 100 TABLET ORAL at 17:49

## 2024-08-28 RX ADMIN — RIVAROXABAN 20 MG: 20 TABLET, FILM COATED ORAL at 11:07

## 2024-08-28 RX ADMIN — TRAZODONE HYDROCHLORIDE 50 MG: 50 TABLET ORAL at 21:10

## 2024-08-28 RX ADMIN — PANTOPRAZOLE SODIUM 40 MG: 40 TABLET, DELAYED RELEASE ORAL at 11:07

## 2024-08-28 RX ADMIN — CARBIDOPA AND LEVODOPA 1 TABLET: 25; 100 TABLET ORAL at 06:11

## 2024-08-28 RX ADMIN — METOPROLOL TARTRATE 25 MG: 25 TABLET, FILM COATED ORAL at 21:10

## 2024-08-28 RX ADMIN — LEVOTHYROXINE SODIUM 112 MCG: 112 TABLET ORAL at 10:56

## 2024-08-28 RX ADMIN — SODIUM CHLORIDE, POTASSIUM CHLORIDE, SODIUM LACTATE AND CALCIUM CHLORIDE 100 ML/HR: 600; 310; 30; 20 INJECTION, SOLUTION INTRAVENOUS at 14:42

## 2024-08-28 RX ADMIN — ASPIRIN 81 MG: 81 TABLET, COATED ORAL at 11:11

## 2024-08-28 RX ADMIN — VENLAFAXINE HYDROCHLORIDE 150 MG: 150 CAPSULE, EXTENDED RELEASE ORAL at 14:39

## 2024-08-28 RX ADMIN — Medication 10 ML: at 21:10

## 2024-08-28 NOTE — PLAN OF CARE
Goal Outcome Evaluation:  Plan of Care Reviewed With: patient        Progress: improving  Outcome Evaluation: vss, no complaints of pain. IVF infusing. cath care completed. pt rested well during shift. labs in am. us planned for today

## 2024-08-28 NOTE — CASE MANAGEMENT/SOCIAL WORK
"Discharge Planning Assessment  [facility]     Patient Name: Darshan Bhatt  MRN: [id number]  Today's Date: 8/28/2024    Admit Date: 8/26/2024        Discharge Needs Assessment       Row Name 08/28/24 1131       Living Environment    People in Home facility resident    Unique Family Situation Brother is ELIU, his name is Enrico Bhatt. contact info [phone]    Current Living Arrangements assisted living facility    Duration at Residence 2 years at [facility] [place] [address]    Potentially Unsafe Housing Conditions none    In the past 12 months has the electric, gas, oil, or water company threatened to shut off services in your home? No    Primary Care Provided by homecare agency    Provides Primary Care For no one, unable/limited ability to care for self    Caregiving Concerns pt has full time cargivers at the [facility] assisted living facility. Per the brother the patient appears at his baseline, other than soft voice and slower response.  Pt may need wheelchair if he exhibits increased inability to tolerate ambulation.    Family Caregiver if Needed other (see comments)  personal care attendant    Quality of Family Relationships helpful;involved    Able to Return to Prior Arrangements yes  per the Enrico NOWAK,\" I will return my brother to  the [facility] , I am certain they can handle him there, they are very accomadating\"       Resource/Environmental Concerns    Resource/Environmental Concerns none    Transportation Concerns none       Transportation Needs    In the past 12 months, has lack of transportation kept you from medical appointments or from getting medications? no    In the past 12 months, has lack of transportation kept you from meetings, work, or from getting things needed for daily living? No       Food Insecurity    Within the past 12 months, you worried that your food would run out before you got the money to buy more. Never true    Within the past 12 months, the food you bought " just didn't last and you didn't have money to get more. Never true       Transition Planning    Patient/Family Anticipates Transition to other (see comments)  return to his assisted living facility    Transportation Anticipated family or friend will provide       Discharge Needs Assessment    Readmission Within the Last 30 Days no previous admission in last 30 days    Current Outpatient/Agency/Support Group assisted living facility    Equipment Currently Used at Home walker, rolling;grab bar;shower chair    Concerns to be Addressed adjustment to diagnosis/illness;care coordination/care conferences;discharge planning    Concerns Comments HIs brother reports [facility]    Anticipated Changes Related to Illness inability to care for self    Equipment Needed After Discharge walker, rolling;shower chair;grab bar, tub/shower;grab bar, toilet    Outpatient/Agency/Support Group Needs assisted living facility    Discharge Facility/Level of Care Needs assisted living facility    Provided Post Acute Provider List? Yes    Provided Post Acute Provider Quality & Resource List? N/A    Delivered To Patient    Current Discharge Risk chronically ill    Discharge Coordination/Progress Continuation of cooerdination of care between all disciplines.                   Discharge Plan       Row Name 08/28/24 1142       Plan    Roadmap to Recovery Yes    Patient/Family in Agreement with Plan yes    Provided Post Acute Provider List? Yes    Delivered To Patient    Method of Delivery In person    Plan Comments Entered room identified self, and role in CCP management.  Verified patient name and address. Pt lives in The [address], [place] [address]. Verified pt uses [facility] pharmacy on [address] in [place] Pt arrival for weakness, and confusion . Permission to contact his brother Enrico Bhatt the POA.  Patient does not drive, the brother does all his driving  . Patient has a walker for ambualtion. He has round the clock personal care  per the brother. He has DME walker, grab bars on shower. Pt denies every having been a resident in a Nursing home or Rehab Facility.  PT denies any financial conerns, the brother handles all his finances.The facility administers his medications. Per the brother, the patient appears to be returning ot his baseline , he does have slower respone to questions and a softer voice.  The brother will drive him back to the assisted living facility [facility].  Mr. Bhatt states that if there is an order for physical therapy for the patient he will give that order to the  at the facility and obtain these services. Case management to follow. MEGAN Howard Rn                  Continued Care and Services - Admitted Since 8/26/2024    No active coordination exists for this encounter.          Demographic Summary    No documentation.                  Functional Status       Row Name 08/28/24 1123       Functional Status    Usual Activity Tolerance poor    Current Activity Tolerance poor    Functional Status Comments Permission to consult the brother by the patient. Per the brother ELIU Bailey, the patient hs slived in assisted living for 2 years, uses a walker and has full time personal care.  He has minimal activity and ambulation at this time.       Physical Activity    On average, how many days per week do you engage in moderate to strenuous exercise (like a brisk walk)? 0 days    On average, how many minutes do you engage in exercise at this level? 0 min    Number of minutes of exercise per week 0       Assessment of Health Literacy    How often do you have someone help you read hospital materials? Always    How often do you have problems learning about your medical condition because of difficulty understanding written information? Always    How often do you have a problem understanding what is told to you about your medical  condition? Often    How confident are you filling out medical forms by yourself? Not at all    Health Literacy Fair  the brother, Enrico NOWAK handles all of his affairs       Functional Status, IADL    Medications assistive person  lives in assisted living facility, [facility], with 24 hour personal care as well    Meal Preparation completely dependent    Housekeeping completely dependent    Laundry completely dependent    Shopping completely dependent    IADL Comments patient does not drive, lives in assisted living facility, all meals and personal care are provided by  [facility] or his personal care attendant       Mental Status    General Appearance WDL appearance    General Appearance unshaven       Mental Status Summary    Recent Changes in Mental Status/Cognitive Functioning ability to speak clearly    Mental Status Comments per the brother, he has a new concern for how softly the patient speaks, and his slow responses. Theses are new concerns per the brother.       Employment/    Employment Status retired    Current or Previous Occupation not applicable                   Psychosocial    No documentation.                  Abuse/Neglect    No documentation.                  Legal    No documentation.                  Substance Abuse    No documentation.                  Patient Forms    No documentation.                     Yen Howard RN

## 2024-08-28 NOTE — DISCHARGE SUMMARY
Patient Name: Darshan Bhatt  : 1945  MRN: [id number]    Date of Admission: 2024  Date of Discharge:  2024  Primary Care Physician: Blanca Hooper MD      Chief Complaint:   Weakness - Generalized      Discharge Diagnoses     Active Hospital Problems    Diagnosis  POA    **Left-sided weakness [R53.1]  Yes    Dementia without behavioral disturbance [F03.90]  Unknown    WPW (Sammy-Parkinson-White syndrome) [I45.6]  Yes    Parkinson disease [G20.A1]  Yes    Chronic anticoagulation [Z79.01]  Not Applicable    PAF (paroxysmal atrial fibrillation) [I48.0]  Yes    Hypothyroidism [E03.9]  Yes    Hyperlipidemia [E78.5]  Yes    Depression [F32.A]  Yes    Coronary arteriosclerosis in native artery [I25.10]  Yes      Resolved Hospital Problems   No resolved problems to display.        Hospital Course     Mr. Bhatt is a 78 y.o. male with a history of hypertension, CAD, atrial fibrillation, CKD stage III AAA, orthostatic hypotension, Parkinson's disease, and suprapubic catheter who presented to [facility] initially complaining of left-sided weakness.  Please see the admitting history and physical for further details.  He was admitted to the hospital for further evaluation and treatment.      History is somewhat vague on presentation, the patient reportedly had transient left-sided weakness and facial droop.  By the time I evaluated the patient, this had resolved.  He does have known history of Parkinson disease and dementia.  CT head was obtained and negative for acute abnormalities.  Neurology was consulted for further evaluation.  The patient was continued on his home dosing of Sinemet.  MRI was checked and negative for acute abnormalities.  Carotid Dopplers revealed less than 50% stenosis bilaterally.  Overall, the patient had good clinical improvement and symptoms did not return.  PT/OT recommended the patient return to his long-term care facility.  The patient did have elevated pressures  and his midodrine was discontinued.  Given the blood pressures remain somewhat elevated, he was started on low-dose amlodipine on day of discharge.  Would recommend ongoing BP monitoring at long-term care facility after discharge to ensure blood pressure stability.  The patient did complain of some occasional chest tightness.  Troponin was very mildly elevated but flat and EKG did not reveal any ischemic changes.  The patient had complete resolution of his chest tightness.  Patient had good clinical improvement.  Discussed with patient's brother on day of discharge.  Overall he was clinically appropriate for discharge back to his long-term care facility.    Day of Discharge     Subjective:  No acute events overnight.  Denies any chest tightness or shortness of breath.  Eating and drinking well.  Agreeable with discharge today.  His brother is at bedside.    Physical Exam:  Temp:  [97.6 °F (36.4 °C)-98.3 °F (36.8 °C)] 97.6 °F (36.4 °C)  Heart Rate:  [79-97] 97  Resp:  [16-18] 18  BP: (106-159)/() 145/108  Body mass index is 26.48 kg/m².  Physical Exam  General: Alert, no acute distress.  Elderly and frail-appearing.  Disoriented.  ENT: No conjunctival injection or scleral icterus. Moist mucous membranes.   Neuro: Eyes open and moving in all directions, generalized weakness, no focal deficits.   Lungs: Clear to auscultation bilaterally. No wheeze or crackles. No distress.   Heart: RRR, no murmurs. No edema.  Abdomen: Soft, non-tender, non-distended. Normal bowel sounds.   Ext: Warm and well-perfused. No edema.   Skin: No rashes or lesions. IV site without swelling or erythema.     Consultants     Consult Orders (all) (From admission, onward)       Start     Ordered    08/26/24 1726  Inpatient Neurology Consult Stroke  Once        Specialty:  Neurology  Provider:  Darshan Sanchez MD    08/26/24 1725    08/26/24 1714  [facility] (on-call MD unless specified) Details  Once        Specialty:  Hospitalist   Provider:  (Not yet assigned)    08/26/24 1713                  Procedures     * Surgery not found *    Imaging Results (All)       Procedure Component Value Units Date/Time    MRI Outside Films [[id number]] Resulted: 08/27/24 1011     Updated: 08/27/24 1011    Narrative:      This procedure was auto-finalized with no dictation required.    MRI Brain Without Contrast [[id number]] Collected: 08/27/24 0917     Updated: 08/27/24 0930    Narrative:      MRI OF THE BRAIN WITHOUT CONTRAST     CLINICAL HISTORY: transient left sided weakness; facial drooping with  drooling.     TECHNIQUE: MRI of the brain was obtained with sagittal T1, axial T1,  axial FLAIR, axial T2, axial diffusion, and susceptibility weighted  images.     COMPARISON: Brain MRI dated 3/12/2014.     FINDINGS:     There are mild changes of chronic small vessel ischemic phenomenon.  Otherwise, the ventricles, sulci, and cisterns are age-appropriate.  There are no abnormal foci of restricted diffusion. There is a tortuous  right vertebral artery. This tortuous vertebral artery as well as a  tortuous right PICA mildly deform the right lateral aspect of the  medulla. No significant interval change is seen since the previous  study. There are no abnormal foci of susceptibility artifact. The  midline intracranial anatomy is unremarkable.       Impression:         No evidence for acute intracranial pathology.     Mild changes of chronic small vessel ischemic phenomenon.     There is a tortuous right vertebral artery and a tortuous right PICA  that mildly deform the right lateral aspect of the medulla. No  significant interval change is seen since the prior study.        This report was finalized on 8/27/2024 9:27 AM by Dr. Lamont Carrero M.D  on Workstation: [id number]       CT Head Without Contrast [[id number]] Collected: 08/26/24 1727     Updated: 08/26/24 1733    Narrative:      CT HEAD WO CONTRAST-     HISTORY:  transient left sided weakness     COMPARISON:  MRI brain 3/12/2024     FINDINGS: The brain ventricles are symmetrical. There is no evidence of  hemorrhage, hydrocephalus or of abnormal extra-axial fluid. No focal  area of decreased attenuation to suggest acute infarction is identified.  Moderate vascular calcification is noted.       Impression:      No acute process is identified. Further evaluation could be  performed with a MRI examination of the brain as indicated.           Radiation dose reduction techniques were utilized, including automated  exposure modulation based on body size.     This report was finalized on 8/26/2024 5:30 PM by Dr. Diaz Lynn M.D  on Workstation: [id number]             Results for orders placed during the hospital encounter of 08/26/24    Duplex Carotid Ultrasound CAR    Interpretation Summary    Right internal carotid artery demonstrates a less than 50% stenosis.    Antegrade right vertebral flow.    Left internal carotid artery demonstrates a less than 50% stenosis.    Antegrade left vertebral flow.      Pertinent Labs     Results from last 7 days   Lab Units 08/28/24  1209 08/27/24  0314 08/26/24  1615   WBC 10*3/mm3 5.49 5.63 5.58   HEMOGLOBIN g/dL 13.0 12.0* 12.8*   PLATELETS 10*3/mm3 145 139* 147     Results from last 7 days   Lab Units 08/28/24  1209 08/27/24  0314 08/26/24  1615   SODIUM mmol/L 138 140 141   POTASSIUM mmol/L 4.4 4.2 4.7   CHLORIDE mmol/L 103 102 101   CO2 mmol/L 26.0 29.9* 28.0   BUN mg/dL 20 27* 31*   CREATININE mg/dL 1.08 1.33* 1.40*   GLUCOSE mg/dL 97 90 88   EGFR mL/min/1.73 69.4 54.0* 50.8*       Results from last 7 days   Lab Units 08/28/24  1209 08/27/24  0314 08/26/24  1615   CALCIUM mg/dL 9.0 9.1 9.5   MAGNESIUM mg/dL  --   --  2.0       Results from last 7 days   Lab Units 08/28/24  1503   HSTROP T ng/L 27*       Results from last 7 days   Lab Units 08/27/24  0314   CHOLESTEROL mg/dL 99   TRIGLYCERIDES mg/dL 48   HDL CHOL mg/dL 49   LDL CHOL mg/dL 38             Test Results Pending at  Discharge     Pending Labs       Order Current Status    CANDIDA A[facility]IS SCREEN - Swab, Axilla Right, Axilla Left and Groin Preliminary result            Discharge Details        Discharge Medications        New Medications        Instructions Start Date   amLODIPine 5 MG tablet  Commonly known as: NORVASC   2.5 mg, Oral, Daily             Continue These Medications        Instructions Start Date   acetaminophen 650 MG 8 hr tablet  Commonly known as: TYLENOL   650 mg, Oral, Every 6 Hours PRN      ACIDOPHILUS PO   1 tablet, Oral, Every Morning      aspirin 81 MG tablet   81 mg, Oral, Daily      atorvastatin 40 MG tablet  Commonly known as: LIPITOR   TAKE 1 TABLET EVERY DAY      B-12 1000 MCG sublingual tablet   1 tablet, Sublingual, Daily      calcium carbonate 500 MG chewable tablet  Commonly known as: TUMS   1 tablet, Oral, Every 4 Hours PRN      carbidopa-levodopa  MG per tablet  Commonly known as: SINEMET   1.5 tablets, Oral, 5 Times Daily, 530am 10am 1pm 4pm  6pm      carbidopa-levodopa CR  MG per CR tablet  Commonly known as: SINEMET CR   1 tablet, Oral, 2 Times Daily, 8am 8pm      cetirizine 10 MG tablet  Commonly known as: zyrTEC   10 mg, Oral, Daily PRN      docusate sodium 100 MG capsule  Commonly known as: COLACE   100 mg, Oral, Daily PRN      levothyroxine 112 MCG tablet  Commonly known as: SYNTHROID, LEVOTHROID   112 mcg, Oral, Daily      melatonin 5 MG tablet tablet   5 mg, Oral, Nightly PRN      metoprolol tartrate 25 MG tablet  Commonly known as: LOPRESSOR   25 mg, Oral, 2 Times Daily      mupirocin 2 % cream  Commonly known as: BACTROBAN   Topical, 2 Times Daily      nitroglycerin 0.3 MG SL tablet  Commonly known as: NITROSTAT   0.3 mg, Sublingual, Every 5 Minutes PRN      Nuplazid 34 MG capsule  Generic drug: Pimavanserin Tartrate   34 mg, Oral, Daily      pantoprazole 40 MG EC tablet  Commonly known as: PROTONIX   TAKE 1 TABLET EVERY DAY      polyethylene glycol 17 g packet  Commonly  known as: MIRALAX   17 g, Oral, Daily PRN      rivaroxaban 20 MG tablet  Commonly known as: XARELTO   Take 1 tablet by mouth Daily.      traZODone 50 MG tablet  Commonly known as: DESYREL   50 mg, Oral, Nightly      venlafaxine  MG 24 hr capsule  Commonly known as: EFFEXOR-XR   150 mg, Oral, Daily      Vitamin D2 50 MCG (2000 UT) tablet   1 tablet, Oral, Every Morning             Stop These Medications      midodrine 10 MG tablet  Commonly known as: PROAMATINE              Allergies   Allergen Reactions    Meloxicam Nausea Only       Discharge Disposition: Long-term care facility  Long Term Care (DC - External)      Discharge Diet:  Diet Order   Procedures    Diet: Cardiac; Healthy Heart (2-3 Na+); Fluid Consistency: Thin (IDDSI 0)       Discharge Activity:       CODE STATUS:    Code Status and Medical Interventions: CPR (Attempt to Resuscitate); Full Support   Ordered at: 08/26/24 1822     Code Status (Patient has no pulse and is not breathing):    CPR (Attempt to Resuscitate)     Medical Interventions (Patient has pulse or is breathing):    Full Support       No future appointments.   Follow-up Information       Blanca Hooper MD .    Specialty: Family Medicine  Contact information:  [address]  [address] [place], [place] [address] [phone]                             Time Spent on Discharge:  Greater than 30 minutes      Shaina Sher MD  [facility]  08/28/24  15:55 EDT

## 2024-08-28 NOTE — THERAPY TREATMENT NOTE
Patient Name: Darshan Bhatt  : 1945    MRN: [id number]                              Today's Date: 2024       Admit Date: 2024    Visit Dx:     ICD-10-CM ICD-9-CM   1. Acute left-sided weakness  R53.1 728.87   2. History of Parkinson's disease  Z86.69 V12.49   3. History of atrial fibrillation  Z86.79 V12.59     Patient Active Problem List   Diagnosis    PAF (paroxysmal atrial fibrillation)    Anxiety    Coronary arteriosclerosis in native artery    Benign prostatic hyperplasia    Depression    Dizziness    Hemorrhoids    Hyperlipidemia    Hypothyroidism    Malignant melanoma of skin    Sleep apnea    Vitamin B12 deficiency    Angiodysplasia of colon without bleeding    Chronic anticoagulation    IFG (impaired fasting glucose)    Orthostatic hypotension    Parkinson disease    BPH with urinary obstruction    Urinary retention    Left-sided weakness    Dementia without behavioral disturbance    WPW (Sammy-Parkinson-White syndrome)     Past Medical History:   Diagnosis Date    A-fib     Allergic     BPH (benign prostatic hyperplasia)     CAD (coronary artery disease)     Cancer     skin    Erectile dysfunction     Hyperlipidemia     Hypogonadism in male     Hypothyroidism     Movement disorder     Parkinson's disease     Sleep apnea     Thyroid disease      Past Surgical History:   Procedure Laterality Date    CARDIAC CATHETERIZATION      COLONOSCOPY      INGUINAL HERNIA REPAIR      PROSTATE SURGERY      SKIN CANCER DESTRUCTION      SUPRAPUBIC TUBE PLACEMENT N/A 2020    Procedure: CYSTOSCOPY SUPRAPUBIC CATHETER PLACEMENT;  Surgeon: Linwood Cox MD;  Location: [facility];  Service: Urology;  Laterality: N/A;    THYROIDECTOMY, PARTIAL        General Information       Row Name 24 1403          OT Time and Intention    Document Type therapy note (daily note)  -KR     Mode of Treatment individual therapy;occupational therapy  -KR       Row Name 24 1403          General Information     Patient Profile Reviewed yes  -KR     Existing Precautions/Restrictions fall  -KR       Row Name 08/28/24 1403          Cognition    Orientation Status (Cognition) oriented x 3  -KR       Row Name 08/28/24 1403          Safety Issues, Functional Mobility    Impairments Affecting Function (Mobility) endurance/activity tolerance;strength;balance  -KR               User Key  (r) = Recorded By, (t) = Taken By, (c) = Cosigned By      Initials Name Provider Type    KR Renetta Rodas OT Occupational Therapist                     Mobility/ADL's       Row Name 08/28/24 1403          Bed Mobility    Supine-Sit [place] (Bed Mobility) standby assist  -KR     Sit-Supine [place] (Bed Mobility) moderate assist (50% patient effort)  -KR       Row Name 08/28/24 1403          Transfers    Transfers sit-stand transfer;toilet transfer  -KR       Row Name 08/28/24 1403          Sit-Stand Transfer    Sit-Stand [place] (Transfers) moderate assist (50% patient effort);verbal cues;1 person assist  -KR     Assistive Device (Sit-Stand Transfers) walker, front-wheeled  -KR       Row Name 08/28/24 1403          Toilet Transfer    Type (Toilet Transfer) sit-stand;stand-sit  -KR     [place] Level (Toilet Transfer) minimum assist (75% patient effort);1 person assist;contact guard  -KR     Assistive Device (Toilet Transfer) walker, front-wheeled  -KR     Comment, (Toilet Transfer) BSC transfer  -KR       Row Name 08/28/24 1403          Functional Mobility    Functional Mobility- Comment Steps along EOB today. pt reports feeling more fatigued this afternoon then yesterday  -KR       Row Name 08/28/24 1403          Activities of Daily Living    BADL Assessment/Intervention toileting  -KR       Row Name 08/28/24 1403          Lower Body Dressing Assessment/Training    [place] Level (Lower Body Dressing) lower body dressing skills;doff;don;minimum assist (75% patient effort);shoes/slippers  -KR     Position (Lower Body  Dressing) edge of bed sitting  -KR       Row Name 08/28/24 1403          Toileting Assessment/Training    [place] Level (Toileting) toileting skills;adjust/manage clothing;moderate assist (50% patient effort);perform perineal hygiene;maximum assist (25% patient effort)  -KR     Position (Toileting) supported standing;supported sitting  -KR               User Key  (r) = Recorded By, (t) = Taken By, (c) = Cosigned By      Initials Name Provider Type    Renetta Pak OT Occupational Therapist                   Obj/Interventions       Row Name 08/28/24 1427          Shoulder (Therapeutic Exercise)    Shoulder (Therapeutic Exercise) AROM (active range of motion)  -KR     Shoulder AROM (Therapeutic Exercise) left;right;flexion;extension;10 repetitions;2 sets  forward punches and shoulder flexion  -KR       Row Name 08/28/24 1427          Elbow/Forearm (Therapeutic Exercise)    Elbow/Forearm (Therapeutic Exercise) AROM (active range of motion)  -KR     Elbow/Forearm AROM (Therapeutic Exercise) right;extension;flexion;2 sets;10 repetitions  -KR       Row Name 08/28/24 1427          Motor Skills    Therapeutic Exercise shoulder;elbow/forearm  -KR       Row Name 08/28/24 1427          Balance    Static Sitting Balance standby assist  -KR     Dynamic Sitting Balance contact guard  -KR     Position, Sitting Balance sitting edge of bed  -KR     Static Standing Balance contact guard;minimal assist  -KR     Dynamic Standing Balance minimal assist;contact guard  -KR     Position/Device Used, Standing Balance supported;walker, front-wheeled  -KR     Balance Interventions sitting;standing  -KR               User Key  (r) = Recorded By, (t) = Taken By, (c) = Cosigned By      Initials Name Provider Type    Renetta Pak OT Occupational Therapist                   Goals/Plan    No documentation.                  Clinical Impression       Row Name 08/28/24 1428          Pain Assessment    Pretreatment Pain Rating 0/10 - no  pain  -KR     Posttreatment Pain Rating 0/10 - no pain  -KR       Row Name 08/28/24 1428          Plan of Care Review    Plan of Care Reviewed With patient;sibling  brother  -KR     Outcome Evaluation Pt seen for OT session this afternoon agreeable to therapy. Performed supine to sit with increased time and SBA. Min A to slip BLE into shoes. Pt demo improvement with STS today requiring Ax1 and use of rwx. Took sidesteps along EOB and then requested to transfer to BSC. Sat at EOB and rested as therapist positioned BSC. Transferred with CGA-min A. Pt did report he felt more tired today. Required max A with posterior hygiene in standing (Ax1) as pt held to rwx. Pt continues to benefit from skilled OT. Recommend continuing therapy when he returns to facility  -TASIA       Row Name 08/28/24 1428          Therapy Plan Review/Discharge Plan (OT)    Anticipated Discharge Disposition (OT) home with home health;home with assist  return to facility with continued therapy services  -KR               User Key  (r) = Recorded By, (t) = Taken By, (c) = Cosigned By      Initials Name Provider Type    Renetta Pak, OT Occupational Therapist                   Outcome Measures       Row Name 08/28/24 1324 08/28/24 0730       How much help from another person do you currently need...    Turning from your back to your side while in flat bed without using bedrails? 3  -ALESIA 3  -PP    Moving from lying on back to sitting on the side of a flat bed without bedrails? 3  -ALESIA 3  -PP    Moving to and from a bed to a chair (including a wheelchair)? 2  -ALESIA 3  -PP    Standing up from a chair using your arms (e.g., wheelchair, bedside chair)? 2  -ALESIA 2  -PP    Climbing 3-5 steps with a railing? 1  -ALESIA 1  -PP    To walk in hospital room? 3  -ALESIA 3  -PP    AM-PAC 6 Clicks Score (PT) 14  -ALESIA 15  -PP    Highest Level of Mobility Goal 4 --> Transfer to chair/commode  -ALESIA 4 --> Transfer to chair/commode  -PP              User Key  (r) = Recorded By, (t) =  Taken By, (c) = Cosigned By      Initials Name Provider Type    Minnie Wagoner, RN Registered Nurse    Silvia Xiong PT Physical Therapist                      OT Recommendation and Plan  Therapy Frequency (OT): 5 times/wk  Plan of Care Review  Plan of Care Reviewed With: patient, sibling (brother)  Outcome Evaluation: Pt seen for OT session this afternoon agreeable to therapy. Performed supine to sit with increased time and SBA. Min A to slip BLE into shoes. Pt demo improvement with STS today requiring Ax1 and use of rwx. Took sidesteps along EOB and then requested to transfer to BSC. Sat at EOB and rested as therapist positioned BSC. Transferred with CGA-min A. Pt did report he felt more tired today. Required max A with posterior hygiene in standing (Ax1) as pt held to rwx. Pt continues to benefit from skilled OT. Recommend continuing therapy when he returns to facility     Time Calculation:         Time Calculation- OT       Row Name 08/28/24 1429 08/28/24 1325          Time Calculation- OT    OT Start Time 1330  -KR --     OT Stop Time 1418  -KR --     OT Time Calculation (min) 48 min  -KR --     OT Received On 08/28/24  -KR --     OT - Next Appointment 08/29/24  -KR --        Timed Charges    95656 - OT Therapeutic Exercise Minutes 28  -KR --     67655 - Gait Training Minutes  -- 8  -ALESIA     55273 - OT Self Care/Mgmt Minutes 20  -KR --        Total Minutes    Timed Charges Total Minutes 48  -KR 8  -ALESIA      Total Minutes 48  -KR 8  -ALESIA               User Key  (r) = Recorded By, (t) = Taken By, (c) = Cosigned By      Initials Name Provider Type    Renetta Pak OT Occupational Therapist    Silvia Xiong, PT Physical Therapist                  Therapy Charges for Today       Code Description Service Date Service Provider Modifiers Qty    07018241251  OT THERAPEUTIC ACT EA 15 MIN 8/27/2024 Renetta Rodas OT GO 1    34301257420  OT EVAL MOD COMPLEXITY 3 8/27/2024 Renetta Rodas OT GO 1     69857019008 HC OT SELF CARE/MGMT/TRAIN EA 15 MIN 8/28/2024 Renetta Rodas, OT GO 1    21237253263 HC OT THER PROC EA 15 MIN 8/28/2024 Renetta Rodas OT GO 2                 Renetta Rodas OT  8/28/2024

## 2024-08-28 NOTE — PLAN OF CARE
Goal Outcome Evaluation:  Plan of Care Reviewed With: patient        Progress: improving  Outcome Evaluation: Pt resting in bed upon PT arrival and agreeable to participate, requesting to use bedside commode. [facility] transfers x 3 bouts requiring height of bed elevated using 2WW and verbal cuing for proper hand placement, and ambulated up to 5ft within room using 2WW requiring [facility] with assistance navigating AD with turns in small spaces. No LOB noted, but posterior lean during standing balance tasks. 2 persons needed for toileting/hygiene, and only 1 person needed to assist with functional mobility tasks indicating good progress on this date. PT continues to recommend returning to facility with PT to follow up.      Anticipated Discharge Disposition (PT): home with assist, home with home health, other (see comments) (return to facility with PT to follow up)

## 2024-08-28 NOTE — THERAPY TREATMENT NOTE
Patient Name: Darshan Bhatt  : 1945    MRN: [id number]                              Today's Date: 2024       Admit Date: 2024    Visit Dx:     ICD-10-CM ICD-9-CM   1. Acute left-sided weakness  R53.1 728.87   2. History of Parkinson's disease  Z86.69 V12.49   3. History of atrial fibrillation  Z86.79 V12.59     Patient Active Problem List   Diagnosis    PAF (paroxysmal atrial fibrillation)    Anxiety    Coronary arteriosclerosis in native artery    Benign prostatic hyperplasia    Depression    Dizziness    Hemorrhoids    Hyperlipidemia    Hypothyroidism    Malignant melanoma of skin    Sleep apnea    Vitamin B12 deficiency    Angiodysplasia of colon without bleeding    Chronic anticoagulation    IFG (impaired fasting glucose)    Orthostatic hypotension    Parkinson disease    BPH with urinary obstruction    Urinary retention    Left-sided weakness    Dementia without behavioral disturbance    WPW (Sammy-Parkinson-White syndrome)     Past Medical History:   Diagnosis Date    A-fib     Allergic     BPH (benign prostatic hyperplasia)     CAD (coronary artery disease)     Cancer     skin    Erectile dysfunction     Hyperlipidemia     Hypogonadism in male     Hypothyroidism     Movement disorder     Parkinson's disease     Sleep apnea     Thyroid disease      Past Surgical History:   Procedure Laterality Date    CARDIAC CATHETERIZATION      COLONOSCOPY      INGUINAL HERNIA REPAIR      PROSTATE SURGERY      SKIN CANCER DESTRUCTION      SUPRAPUBIC TUBE PLACEMENT N/A 2020    Procedure: CYSTOSCOPY SUPRAPUBIC CATHETER PLACEMENT;  Surgeon: Linwood Cox MD;  Location: [facility];  Service: Urology;  Laterality: N/A;    THYROIDECTOMY, PARTIAL        General Information       Row Name 24 1312          Physical Therapy Time and Intention    Document Type therapy note (daily note)  -ALESIA     Mode of Treatment individual therapy;physical therapy  -ALESIA       Row Name 24 1317          General  Information    Patient Profile Reviewed yes  -ALESIA     Existing Precautions/Restrictions fall  -ALESIA     Barriers to Rehab cognitive status  -       Row Name 08/28/24 1315          Living Environment    People in Home facility resident  -ALESIA       Row Name 08/28/24 1315          Cognition    Orientation Status (Cognition) oriented x 3  -ALESIA       Row Name 08/28/24 1315          Safety Issues, Functional Mobility    Impairments Affecting Function (Mobility) endurance/activity tolerance;strength;balance  -ALESIA               User Key  (r) = Recorded By, (t) = Taken By, (c) = Cosigned By      Initials Name Provider Type    Silvia Xiong PT Physical Therapist                   Mobility       Row Name 08/28/24 1315          Bed Mobility    Bed Mobility supine-sit;sit-supine  -ALESIA     Supine-Sit [place] (Bed Mobility) standby assist  -     Sit-Supine [place] (Bed Mobility) minimum assist (75% patient effort)  -     Assistive Device (Bed Mobility) head of bed elevated  -ALESIA     Comment, (Bed Mobility) Ángel to lift BLE to level of bed during sit>supine  -       Row Name 08/28/24 1315          Sit-Stand Transfer    Sit-Stand [place] (Transfers) moderate assist (50% patient effort);minimum assist (75% patient effort);verbal cues;nonverbal cues (demo/gesture);1 person assist  -     Assistive Device (Sit-Stand Transfers) walker, front-wheeled  -ALESIA     Comment, (Sit-Stand Transfer) Height of bed elevated and cuing for proper hand placement x 3 bouts  -ALESIA       Row Name 08/28/24 1315          Gait/Stairs (Locomotion)    [place] Level (Gait) contact guard;verbal cues;minimum assist (75% patient effort)  -     Assistive Device (Gait) walker, front-wheeled  -ALESIA     Patient was able to Ambulate yes  -ALESIA     Distance in Feet (Gait) 5  -ALESIA     Deviations/Abnormal Patterns (Gait) gait speed decreased;festinating/shuffling;base of support, narrow;stride length decreased  -ALESIA     Bilateral Gait Deviations forward  flexed posture  -ALESIA               User Key  (r) = Recorded By, (t) = Taken By, (c) = Cosigned By      Initials Name Provider Type    Silvia Xiong PT Physical Therapist                   Obj/Interventions       Row Name 08/28/24 1317          Balance    Balance Assessment sitting static balance;sitting dynamic balance;standing static balance;standing dynamic balance  -ALESIA     Static Sitting Balance supervision;standby assist  -ALESIA     Dynamic Sitting Balance standby assist;contact guard  -ALESIA     Position, Sitting Balance unsupported;sitting in chair;sitting edge of bed  -ALESIA     Static Standing Balance contact guard;minimal assist  -ALESIA     Dynamic Standing Balance contact guard;minimal assist  -ALESIA     Position/Device Used, Standing Balance supported;walker, front-wheeled  -ALESIA     Balance Interventions sitting;standing;static;dynamic;minimal challenge;weight shifting activity  -     Comment, Balance Note posterior trunk lean during static and dynamic standing balance tasks, which improved with periodic verbal cuing  -ALESIA               User Key  (r) = Recorded By, (t) = Taken By, (c) = Cosigned By      Initials Name Provider Type    Silvia Xiong PT Physical Therapist                   Goals/Plan       Row Name 08/28/24 1324          Therapy Assessment/Plan (PT)    Planned Therapy Interventions (PT) balance training;bed mobility training;gait training;home exercise program;stretching;strengthening;patient/family education;transfer training  -ALESIA               User Key  (r) = Recorded By, (t) = Taken By, (c) = Cosigned By      Initials Name Provider Type    Silvia Xiong PT Physical Therapist                   Clinical Impression       Row Name 08/28/24 1319          Pain    Pretreatment Pain Rating 0/10 - no pain  -ALESIA     Posttreatment Pain Rating 0/10 - no pain  -ALESIA       Row Name 08/28/24 1319          Plan of Care Review    Plan of Care Reviewed With patient  -ALESIA     Progress improving  -ALESIA     Outcome  Evaluation Pt resting in bed upon PT arrival and agreeable to participate, requesting to use bedside commode. Pt completed bed mobility at [facility], transfers x 3 bouts requiring height of bed elevated using 2WW and verbal cuing for proper hand placement, and ambulated up to 5ft within room using 2WW requiring [facility] with assistance navigating AD with turns in small spaces. No LOB noted, but posterior lean during standing balance tasks. 2 persons needed for toileting/hygiene, and only 1 person needed to assist with functional mobility tasks indicating good progress on this date. PT continues to recommend returning to facility with PT to follow up.  -ALESIA       Row Name 08/28/24 1319          Therapy Assessment/Plan (PT)    Rehab Potential (PT) good, to achieve stated therapy goals  -ALESIA     Criteria for Skilled Interventions Met (PT) yes;meets criteria  -ALESIA     Therapy Frequency (PT) 5 times/wk  -ALESIA               User Key  (r) = Recorded By, (t) = Taken By, (c) = Cosigned By      Initials Name Provider Type    Silvia Xiong, PT Physical Therapist                   Outcome Measures       Row Name 08/28/24 1324 08/28/24 0730       How much help from another person do you currently need...    Turning from your back to your side while in flat bed without using bedrails? 3  -ALESIA 3  -PP    Moving from lying on back to sitting on the side of a flat bed without bedrails? 3  -ALESIA 3  -PP    Moving to and from a bed to a chair (including a wheelchair)? 2  -ALESIA 3  -PP    Standing up from a chair using your arms (e.g., wheelchair, bedside chair)? 2  -ALESIA 2  -PP    Climbing 3-5 steps with a railing? 1  -ALESIA 1  -PP    To walk in hospital room? 3  -ALESIA 3  -PP    AM-PAC 6 Clicks Score (PT) 14  -ALESIA 15  -PP    Highest Level of Mobility Goal 4 --> Transfer to chair/commode  -ALESIA 4 --> Transfer to chair/commode  -PP              User Key  (r) = Recorded By, (t) = Taken By, (c) = Cosigned By      Initials Name Provider Type    SUKHDEV Osorio  Minnie, RN Registered Nurse    Silvia Xiong, PT Physical Therapist                                 Physical Therapy Education       Title: PT OT SLP Therapies (In Progress)       Topic: Physical Therapy (In Progress)       Point: Mobility training (Done)       Learning Progress Summary             Patient Acceptance, E, VU by ALESIA at 8/28/2024 1325    Acceptance, E,TB, VU,NR by CB at 8/27/2024 1058                         Point: Home exercise program (Not Started)       Learner Progress:  Not documented in this visit.              Point: Body mechanics (Done)       Learning Progress Summary             Patient Acceptance, E, VU by ALESIA at 8/28/2024 1325    Acceptance, E,TB, VU,NR by CB at 8/27/2024 1058                         Point: Precautions (Done)       Learning Progress Summary             Patient Acceptance, E, VU by ALESIA at 8/28/2024 1325    Acceptance, E,TB, VU,NR by CB at 8/27/2024 1058                                         User Key       Initials Effective Dates Name Provider Type Discipline    CHARLIE 10/22/21 -  Sue Ferrera, PT Physical Therapist PT    ALESIA 08/24/23 -  Silvia Sanchez, ROMMEL Physical Therapist PT                  PT Recommendation and Plan  Planned Therapy Interventions (PT): balance training, bed mobility training, gait training, home exercise program, stretching, strengthening, patient/family education, transfer training  Plan of Care Reviewed With: patient  Progress: improving  Outcome Evaluation: Pt resting in bed upon PT arrival and agreeable to participate, requesting to use bedside commode. Pt completed bed mobility at [facility], transfers x 3 bouts requiring height of bed elevated using 2WW and verbal cuing for proper hand placement, and ambulated up to 5ft within room using 2WW requiring [facility] with assistance navigating AD with turns in small spaces. No LOB noted, but posterior lean during standing balance tasks. 2 persons needed for toileting/hygiene, and only 1 person needed to  assist with functional mobility tasks indicating good progress on this date. PT continues to recommend returning to facility with PT to follow up.     Time Calculation:         PT Charges       Row Name 08/28/24 1325             Time Calculation    Start Time 1210  -ALESIA      Stop Time 1245  -      Time Calculation (min) 35 min  -ALESIA      PT Received On 08/28/24  -ALESIA      PT - Next Appointment 08/29/24  -         Time Calculation- PT    Total Timed Code Minutes- PT 31 minute(s)  -ALESIA         Timed Charges    37689 - Gait Training Minutes  8  -ALESIA      17125 - PT Therapeutic Activity Minutes 23  -ALESIA         Total Minutes    Timed Charges Total Minutes 31  -ALESIA       Total Minutes 31  -ALESIA                User Key  (r) = Recorded By, (t) = Taken By, (c) = Cosigned By      Initials Name Provider Type    Silvia Xiong PT Physical Therapist                  Therapy Charges for Today       Code Description Service Date Service Provider Modifiers Qty    82660122398 HC GAIT TRAINING EA 15 MIN 8/28/2024 Silvia Sanchez, PT GP 1    [id number]  PT THERAPEUTIC ACT EA 15 MIN 8/28/2024 Silvia Sanchez, PT GP 1            PT G-Codes  Outcome Measure Options: AM-PAC 6 Clicks Basic Mobility (PT)  AM-PAC 6 Clicks Score (PT): 14  PT Discharge Summary  Anticipated Discharge Disposition (PT): home with assist, home with home health, other (see comments) (return to facility with PT to follow up)    Slivia Sanchez PT  8/28/2024

## 2024-08-28 NOTE — PROGRESS NOTES
Name: Darshan Bhatt ADMIT: 2024   : 1945  PCP: Blanca Hooper MD    MRN: [id number] LOS: 0 days   AGE/SEX: 78 y.o. male  ROOM:      Subjective   Subjective   No acute events overnight.  Patient resting comfortably.  Somewhat more awake today.  Complains of intermittent chest tightness but denies chest pain or shortness of breath.  Eating and drinking well.    Objective   Objective     Vital Signs  Temp:  [97.6 °F (36.4 °C)-98.3 °F (36.8 °C)] 97.6 °F (36.4 °C)  Heart Rate:  [79-97] 97  Resp:  [16-18] 18  BP: (106-159)/() 145/108     on   ;   Device (Oxygen Therapy): room air  Body mass index is 26.48 kg/m².    Physical Exam  General: Alert, no acute distress.  Elderly and frail-appearing.  Disoriented.  Somewhat more awake today.  ENT: No conjunctival injection or scleral icterus. Moist mucous membranes.   Neuro: Eyes open and moving in all directions, generalized weakness, no focal deficits.   Lungs: Clear to auscultation bilaterally. No wheeze or crackles. No distress.   Heart: RRR, no murmurs. No edema.  Abdomen: Soft, non-tender, non-distended. Normal bowel sounds.   Ext: Warm and well-perfused. No edema.   Skin: No rashes or lesions. IV site without swelling or erythema.     Results Review     I reviewed the patient's new clinical results:  Results from last 7 days   Lab Units 24  1209 24  0314 24  1615   WBC 10*3/mm3 5.49 5.63 5.58   HEMOGLOBIN g/dL 13.0 12.0* 12.8*   PLATELETS 10*3/mm3 145 139* 147     Results from last 7 days   Lab Units 24  1209 24  0314 24  1615   SODIUM mmol/L 138 140 141   POTASSIUM mmol/L 4.4 4.2 4.7   CHLORIDE mmol/L 103 102 101   CO2 mmol/L 26.0 29.9* 28.0   BUN mg/dL 20 27* 31*   CREATININE mg/dL 1.08 1.33* 1.40*   GLUCOSE mg/dL 97 90 88   EGFR mL/min/1.73 69.4 54.0* 50.8*       Results from last 7 days   Lab Units 24  1209 24  0314 24  1615   CALCIUM mg/dL 9.0 9.1 9.5   MAGNESIUM mg/dL  --   --  2.0        Hemoglobin A1C   Date/Time Value Ref Range Status   08/27/2024 0314 6.00 (H) 4.80 - 5.60 % Final     Glucose   Date/Time Value Ref Range Status   08/27/2024 1725 96 70 - 130 mg/dL Final       MRI Brain Without Contrast    Result Date: 8/27/2024   No evidence for acute intracranial pathology.  Mild changes of chronic small vessel ischemic phenomenon.  There is a tortuous right vertebral artery and a tortuous right PICA that mildly deform the right lateral aspect of the medulla. No significant interval change is seen since the prior study.   This report was finalized on 8/27/2024 9:27 AM by Dr. Lamont Carrero M.D on Workstation: [id number]      CT Head Without Contrast    Result Date: 8/26/2024  No acute process is identified. Further evaluation could be performed with a MRI examination of the brain as indicated.    Radiation dose reduction techniques were utilized, including automated exposure modulation based on body size.  This report was finalized on 8/26/2024 5:30 PM by Dr. Diaz Lynn M.D on Workstation: [id number]       I have personally reviewed all medications:  Scheduled Medications  aspirin, 81 mg, Oral, Daily  atorvastatin, 40 mg, Oral, Daily  carbidopa-levodopa, 1 tablet, Oral, 5x Daily  carbidopa-levodopa CR, 1 tablet, Oral, BID  levothyroxine, 112 mcg, Oral, Q AM  metoprolol tartrate, 25 mg, Oral, BID  [Held by provider] midodrine, 10 mg, Oral, TID AC  pantoprazole, 40 mg, Oral, Daily  rivaroxaban, 20 mg, Oral, Daily  sodium chloride, 500 mL, Intravenous, Once  sodium chloride, 10 mL, Intravenous, Q12H  traZODone, 50 mg, Oral, Nightly  venlafaxine XR, 150 mg, Oral, Daily    Infusions  lactated ringers, 100 mL/hr, Last Rate: 100 mL/hr (08/28/24 1442)    Diet  Diet: Cardiac; Healthy Heart (2-3 Na+); Fluid Consistency: Thin (IDDSI 0)      Intake/Output Summary (Last 24 hours) at 8/28/2024 1605  Last data filed at 8/28/2024 1325  Gross per 24 hour   Intake 300 ml   Output 1625 ml   Net -1325 ml        Assessment/Plan     Active Hospital Problems    Diagnosis  POA    **Left-sided weakness [R53.1]  Yes    Dementia without behavioral disturbance [F03.90]  Unknown    WPW (Sammy-Parkinson-White syndrome) [I45.6]  Yes    Parkinson disease [G20.A1]  Yes    Chronic anticoagulation [Z79.01]  Not Applicable    PAF (paroxysmal atrial fibrillation) [I48.0]  Yes    Hypothyroidism [E03.9]  Yes    Hyperlipidemia [E78.5]  Yes    Depression [F32.A]  Yes    Coronary arteriosclerosis in native artery [I25.10]  Yes      Resolved Hospital Problems   No resolved problems to display.     78 y.o. male with Left-sided weakness.    Transient left-sided weakness with facial droop  Parkinson disease  Dementia  -Has now resolved.  CT head with no acute findings.  -Neurology consulted  -Continue home Sinemet   -MRI with no acute abnormalities  -Carotid Dopplers with less than 50% stenosis bilaterally  -Cleared for discharge from a neurology standpoint    Chest tightness  -Has resolved at this time but was present earlier  -Blood pressure intermittently elevated  -Troponin elevated at 27, trend until down trending  -Check EKG  -Continue to closely monitor     Hypertension  Hyperlipidemia  Coronary artery disease  Concealed Sammy-Parkinson-White syndrome  Permanent atrial fibrillation with SSS   -Continue aspirin, statin, metoprolol  -Continue Xarelto   -Add low-dose amlodipine for elevated blood pressures     CKD 3 A  -Baseline around 1.2, creatinine has normalized today  -Discontinue IV fluid  -Repeat BMP with morning lab     Orthostatic hypotension  Supine hypertension  -Hold midodrine.  Continue metoprolol as above.       Hypothyroidism  -Continue home Synthroid     Suprapubic catheter  -Known E. coli colonization    SCDs for DVT prophylaxis.  Full code.  Discussed with patient and family.  Anticipate discharge TBD    Attempted to call the patient's brother Enrico and provide an update but he did not answer.  Will try again  tomorrow.    Shaina Sher MD  [facility]  08/28/24  16:05 EDT

## 2024-08-28 NOTE — NURSING NOTE
"Brother at bedside. Reports visiting at least every other day at assisted living. As Parkinson's advances, \"no two days are the same\".  Alertness, verbalization and mobility waxes and wanes. Brother has provided. Durable Power of  paperwork, in chart. States he will be able to transport patient back to assisted living upon discharge. Denies any needs at discharge.   "

## 2024-08-28 NOTE — PLAN OF CARE
Goal Outcome Evaluation:  Plan of Care Reviewed With: patient, sibling (brother)           Outcome Evaluation: Pt seen for OT session this afternoon agreeable to therapy. Performed supine to sit with increased time and SBA. Min A to slip BLE into shoes. Pt demo improvement with STS today requiring Ax1 and use of rwx. Took sidesteps along EOB and then requested to transfer to BSC. Sat at EOB and rested as therapist positioned BSC. Transferred with CGA-min A. Pt did report he felt more tired today. Required max A with posterior hygiene in standing (Ax1) as pt held to rwx. Pt continues to benefit from skilled OT. Recommend continuing therapy when he returns to facility      Anticipated Discharge Disposition (OT): home with home health, home with assist (return to facility with continued therapy services)

## 2024-08-29 VITALS
OXYGEN SATURATION: 97 % | RESPIRATION RATE: 16 BRPM | DIASTOLIC BLOOD PRESSURE: 83 MMHG | HEIGHT: 72 IN | BODY MASS INDEX: 26.46 KG/M2 | TEMPERATURE: 98.1 F | SYSTOLIC BLOOD PRESSURE: 122 MMHG | HEART RATE: 78 BPM | WEIGHT: 195.33 LBS

## 2024-08-29 LAB
ANION GAP SERPL CALCULATED.3IONS-SCNC: 8.5 MMOL/L (ref 5–15)
BUN SERPL-MCNC: 19 MG/DL (ref 8–23)
BUN/CREAT SERPL: 18.4 (ref 7–25)
CALCIUM SPEC-SCNC: 9.1 MG/DL (ref 8.6–10.5)
CHLORIDE SERPL-SCNC: 104 MMOL/L (ref 98–107)
CO2 SERPL-SCNC: 28.5 MMOL/L (ref 22–29)
CREAT SERPL-MCNC: 1.03 MG/DL (ref 0.76–1.27)
DEPRECATED RDW RBC AUTO: 44.4 FL (ref 37–54)
EGFRCR SERPLBLD CKD-EPI 2021: 73.4 ML/MIN/1.73
ERYTHROCYTE [DISTWIDTH] IN BLOOD BY AUTOMATED COUNT: 13 % (ref 12.3–15.4)
GLUCOSE SERPL-MCNC: 87 MG/DL (ref 65–99)
HCT VFR BLD AUTO: 37.9 % (ref 37.5–51)
HGB BLD-MCNC: 12.5 G/DL (ref 13–17.7)
MCH RBC QN AUTO: 30.8 PG (ref 26.6–33)
MCHC RBC AUTO-ENTMCNC: 33 G/DL (ref 31.5–35.7)
MCV RBC AUTO: 93.3 FL (ref 79–97)
PLATELET # BLD AUTO: 153 10*3/MM3 (ref 140–450)
PMV BLD AUTO: 10.3 FL (ref 6–12)
POTASSIUM SERPL-SCNC: 4.1 MMOL/L (ref 3.5–5.2)
RBC # BLD AUTO: 4.06 10*6/MM3 (ref 4.14–5.8)
SODIUM SERPL-SCNC: 141 MMOL/L (ref 136–145)
WBC NRBC COR # BLD AUTO: 5.78 10*3/MM3 (ref 3.4–10.8)

## 2024-08-29 PROCEDURE — G0378 HOSPITAL OBSERVATION PER HR: HCPCS

## 2024-08-29 PROCEDURE — 85027 COMPLETE CBC AUTOMATED: CPT | Performed by: STUDENT IN AN ORGANIZED HEALTH CARE EDUCATION/TRAINING PROGRAM

## 2024-08-29 PROCEDURE — 80048 BASIC METABOLIC PNL TOTAL CA: CPT | Performed by: STUDENT IN AN ORGANIZED HEALTH CARE EDUCATION/TRAINING PROGRAM

## 2024-08-29 RX ADMIN — ATORVASTATIN CALCIUM 40 MG: 20 TABLET, FILM COATED ORAL at 08:10

## 2024-08-29 RX ADMIN — METOPROLOL TARTRATE 25 MG: 25 TABLET, FILM COATED ORAL at 08:10

## 2024-08-29 RX ADMIN — RIVAROXABAN 20 MG: 20 TABLET, FILM COATED ORAL at 08:10

## 2024-08-29 RX ADMIN — VENLAFAXINE HYDROCHLORIDE 150 MG: 150 CAPSULE, EXTENDED RELEASE ORAL at 08:10

## 2024-08-29 RX ADMIN — CARBIDOPA AND LEVODOPA 1 TABLET: 25; 100 TABLET ORAL at 06:23

## 2024-08-29 RX ADMIN — PANTOPRAZOLE SODIUM 40 MG: 40 TABLET, DELAYED RELEASE ORAL at 08:10

## 2024-08-29 RX ADMIN — ASPIRIN 81 MG: 81 TABLET, COATED ORAL at 08:10

## 2024-08-29 RX ADMIN — AMLODIPINE BESYLATE 2.5 MG: 5 TABLET ORAL at 08:10

## 2024-08-29 RX ADMIN — Medication 10 ML: at 08:12

## 2024-08-29 NOTE — PLAN OF CARE
Goal Outcome Evaluation:  Plan of Care Reviewed With: patient        Progress: improving  Outcome Evaluation: vss, no complaints of pain. pt rested well during shift. bm x1. cath care completed.  pt rested well during shift. labs in am. poss d/c home.

## 2024-08-29 NOTE — NURSING NOTE
Enrico Bhatt, Brother / POA able to verbalize understanding of discharge instructions including stopping midodrine and start new low-dose Norvasc for blood pressure.

## 2024-08-31 ENCOUNTER — APPOINTMENT (OUTPATIENT)
Dept: GENERAL RADIOLOGY | Facility: HOSPITAL | Age: 79
End: 2024-08-31
Payer: MEDICARE

## 2024-08-31 ENCOUNTER — APPOINTMENT (OUTPATIENT)
Dept: CT IMAGING | Facility: HOSPITAL | Age: 79
End: 2024-08-31
Payer: MEDICARE

## 2024-08-31 ENCOUNTER — HOSPITAL ENCOUNTER (OUTPATIENT)
Facility: HOSPITAL | Age: 79
Setting detail: OBSERVATION
Discharge: HOME | End: 2024-09-01
Attending: EMERGENCY MEDICINE | Admitting: HOSPITALIST
Payer: MEDICARE

## 2024-08-31 DIAGNOSIS — R41.82 ALTERED MENTAL STATUS, UNSPECIFIED ALTERED MENTAL STATUS TYPE: Primary | ICD-10-CM

## 2024-08-31 DIAGNOSIS — N17.9 AKI (ACUTE KIDNEY INJURY): ICD-10-CM

## 2024-08-31 DIAGNOSIS — N48.1 BALANITIS: ICD-10-CM

## 2024-08-31 PROBLEM — R82.90 ABNORMAL FINDING ON URINALYSIS: Status: ACTIVE | Noted: 2024-08-31

## 2024-08-31 PROBLEM — N18.9 CKD (CHRONIC KIDNEY DISEASE): Status: ACTIVE | Noted: 2024-08-31

## 2024-08-31 PROBLEM — R79.89 ELEVATED TROPONIN: Status: ACTIVE | Noted: 2024-08-31

## 2024-08-31 PROBLEM — N18.2 STAGE 2 CHRONIC KIDNEY DISEASE: Status: ACTIVE | Noted: 2024-08-31

## 2024-08-31 LAB
ALBUMIN SERPL-MCNC: 3.8 G/DL (ref 3.5–5.2)
ALBUMIN/GLOB SERPL: 1.5 G/DL
ALP SERPL-CCNC: 62 U/L (ref 39–117)
ALT SERPL W P-5'-P-CCNC: <5 U/L (ref 1–41)
AMMONIA BLD-SCNC: <10 UMOL/L (ref 16–60)
AMPHET+METHAMPHET UR QL: NEGATIVE
ANION GAP SERPL CALCULATED.3IONS-SCNC: 10 MMOL/L (ref 5–15)
APTT PPP: 34.2 SECONDS (ref 22.7–35.4)
AST SERPL-CCNC: 12 U/L (ref 1–40)
BACTERIA UR QL AUTO: ABNORMAL /HPF
BARBITURATES UR QL SCN: NEGATIVE
BASOPHILS # BLD AUTO: 0.02 10*3/MM3 (ref 0–0.2)
BASOPHILS NFR BLD AUTO: 0.3 % (ref 0–1.5)
BENZODIAZ UR QL SCN: NEGATIVE
BILIRUB SERPL-MCNC: 0.5 MG/DL (ref 0–1.2)
BILIRUB UR QL STRIP: NEGATIVE
BUN SERPL-MCNC: 30 MG/DL (ref 8–23)
BUN/CREAT SERPL: 23.1 (ref 7–25)
CALCIUM SPEC-SCNC: 8.9 MG/DL (ref 8.6–10.5)
CANNABINOIDS SERPL QL: NEGATIVE
CHLORIDE SERPL-SCNC: 100 MMOL/L (ref 98–107)
CLARITY UR: CLEAR
CO2 SERPL-SCNC: 28 MMOL/L (ref 22–29)
COCAINE UR QL: NEGATIVE
COLOR UR: YELLOW
CREAT SERPL-MCNC: 1.3 MG/DL (ref 0.76–1.27)
DEPRECATED RDW RBC AUTO: 44.6 FL (ref 37–54)
EGFRCR SERPLBLD CKD-EPI 2021: 55.5 ML/MIN/1.73
EOSINOPHIL # BLD AUTO: 0.31 10*3/MM3 (ref 0–0.4)
EOSINOPHIL NFR BLD AUTO: 5.3 % (ref 0.3–6.2)
ERYTHROCYTE [DISTWIDTH] IN BLOOD BY AUTOMATED COUNT: 13 % (ref 12.3–15.4)
FENTANYL UR-MCNC: NEGATIVE NG/ML
GLOBULIN UR ELPH-MCNC: 2.6 GM/DL
GLUCOSE SERPL-MCNC: 96 MG/DL (ref 65–99)
GLUCOSE UR STRIP-MCNC: NEGATIVE MG/DL
HCT VFR BLD AUTO: 38.5 % (ref 37.5–51)
HGB BLD-MCNC: 12.5 G/DL (ref 13–17.7)
HGB UR QL STRIP.AUTO: NEGATIVE
HYALINE CASTS UR QL AUTO: ABNORMAL /LPF
IMM GRANULOCYTES # BLD AUTO: 0.01 10*3/MM3 (ref 0–0.05)
IMM GRANULOCYTES NFR BLD AUTO: 0.2 % (ref 0–0.5)
INR PPP: 1.61 (ref 0.9–1.1)
KETONES UR QL STRIP: NEGATIVE
LEUKOCYTE ESTERASE UR QL STRIP.AUTO: ABNORMAL
LYMPHOCYTES # BLD AUTO: 1.9 10*3/MM3 (ref 0.7–3.1)
LYMPHOCYTES NFR BLD AUTO: 32.3 % (ref 19.6–45.3)
MAGNESIUM SERPL-MCNC: 1.9 MG/DL (ref 1.6–2.4)
MCH RBC QN AUTO: 30.6 PG (ref 26.6–33)
MCHC RBC AUTO-ENTMCNC: 32.5 G/DL (ref 31.5–35.7)
MCV RBC AUTO: 94.4 FL (ref 79–97)
METHADONE UR QL SCN: NEGATIVE
MONOCYTES # BLD AUTO: 0.88 10*3/MM3 (ref 0.1–0.9)
MONOCYTES NFR BLD AUTO: 15 % (ref 5–12)
NEUTROPHILS NFR BLD AUTO: 2.76 10*3/MM3 (ref 1.7–7)
NEUTROPHILS NFR BLD AUTO: 46.9 % (ref 42.7–76)
NITRITE UR QL STRIP: NEGATIVE
NRBC BLD AUTO-RTO: 0 /100 WBC (ref 0–0.2)
OPIATES UR QL: NEGATIVE
OXYCODONE UR QL SCN: NEGATIVE
PH UR STRIP.AUTO: 5.5 [PH] (ref 5–8)
PLATELET # BLD AUTO: 147 10*3/MM3 (ref 140–450)
PMV BLD AUTO: 10.7 FL (ref 6–12)
POTASSIUM SERPL-SCNC: 4.1 MMOL/L (ref 3.5–5.2)
PROCALCITONIN SERPL-MCNC: 0.1 NG/ML (ref 0–0.25)
PROT SERPL-MCNC: 6.4 G/DL (ref 6–8.5)
PROT UR QL STRIP: ABNORMAL
PROTHROMBIN TIME: 19.4 SECONDS (ref 11.7–14.2)
RBC # BLD AUTO: 4.08 10*6/MM3 (ref 4.14–5.8)
RBC # UR STRIP: ABNORMAL /HPF
REF LAB TEST METHOD: ABNORMAL
SODIUM SERPL-SCNC: 138 MMOL/L (ref 136–145)
SP GR UR STRIP: 1.02 (ref 1–1.03)
SQUAMOUS #/AREA URNS HPF: ABNORMAL /HPF
T4 FREE SERPL-MCNC: 1.46 NG/DL (ref 0.92–1.68)
TROPONIN T SERPL HS-MCNC: 27 NG/L
TSH SERPL DL<=0.05 MIU/L-ACNC: 5.04 UIU/ML (ref 0.27–4.2)
UROBILINOGEN UR QL STRIP: ABNORMAL
WBC # UR STRIP: ABNORMAL /HPF
WBC NRBC COR # BLD AUTO: 5.88 10*3/MM3 (ref 3.4–10.8)

## 2024-08-31 PROCEDURE — 82140 ASSAY OF AMMONIA: CPT | Performed by: NURSE PRACTITIONER

## 2024-08-31 PROCEDURE — 80307 DRUG TEST PRSMV CHEM ANLYZR: CPT | Performed by: NURSE PRACTITIONER

## 2024-08-31 PROCEDURE — 81001 URINALYSIS AUTO W/SCOPE: CPT | Performed by: NURSE PRACTITIONER

## 2024-08-31 PROCEDURE — 25810000003 SODIUM CHLORIDE 0.9 % SOLUTION: Performed by: NURSE PRACTITIONER

## 2024-08-31 PROCEDURE — 84443 ASSAY THYROID STIM HORMONE: CPT | Performed by: NURSE PRACTITIONER

## 2024-08-31 PROCEDURE — 85730 THROMBOPLASTIN TIME PARTIAL: CPT | Performed by: NURSE PRACTITIONER

## 2024-08-31 PROCEDURE — P9612 CATHETERIZE FOR URINE SPEC: HCPCS

## 2024-08-31 PROCEDURE — 85025 COMPLETE CBC W/AUTO DIFF WBC: CPT | Performed by: NURSE PRACTITIONER

## 2024-08-31 PROCEDURE — 87102 FUNGUS ISOLATION CULTURE: CPT | Performed by: HOSPITALIST

## 2024-08-31 PROCEDURE — 84439 ASSAY OF FREE THYROXINE: CPT | Performed by: NURSE PRACTITIONER

## 2024-08-31 PROCEDURE — G0378 HOSPITAL OBSERVATION PER HR: HCPCS

## 2024-08-31 PROCEDURE — 80053 COMPREHEN METABOLIC PANEL: CPT | Performed by: NURSE PRACTITIONER

## 2024-08-31 PROCEDURE — 71045 X-RAY EXAM CHEST 1 VIEW: CPT

## 2024-08-31 PROCEDURE — 70450 CT HEAD/BRAIN W/O DYE: CPT

## 2024-08-31 PROCEDURE — 99285 EMERGENCY DEPT VISIT HI MDM: CPT

## 2024-08-31 PROCEDURE — 84484 ASSAY OF TROPONIN QUANT: CPT | Performed by: NURSE PRACTITIONER

## 2024-08-31 PROCEDURE — 25810000003 SODIUM CHLORIDE 0.9 % SOLUTION: Performed by: PHYSICIAN ASSISTANT

## 2024-08-31 PROCEDURE — 85610 PROTHROMBIN TIME: CPT | Performed by: NURSE PRACTITIONER

## 2024-08-31 PROCEDURE — 84145 PROCALCITONIN (PCT): CPT | Performed by: NURSE PRACTITIONER

## 2024-08-31 PROCEDURE — 83735 ASSAY OF MAGNESIUM: CPT | Performed by: NURSE PRACTITIONER

## 2024-08-31 RX ORDER — LEVOTHYROXINE SODIUM 112 UG/1
112 TABLET ORAL DAILY
Status: DISCONTINUED | OUTPATIENT
Start: 2024-08-31 | End: 2024-09-01 | Stop reason: HOSPADM

## 2024-08-31 RX ORDER — VENLAFAXINE HYDROCHLORIDE 150 MG/1
150 CAPSULE, EXTENDED RELEASE ORAL DAILY
Status: DISCONTINUED | OUTPATIENT
Start: 2024-08-31 | End: 2024-09-01 | Stop reason: HOSPADM

## 2024-08-31 RX ORDER — SODIUM CHLORIDE 0.9 % (FLUSH) 0.9 %
10 SYRINGE (ML) INJECTION EVERY 12 HOURS SCHEDULED
Status: DISCONTINUED | OUTPATIENT
Start: 2024-08-31 | End: 2024-09-01 | Stop reason: HOSPADM

## 2024-08-31 RX ORDER — ONDANSETRON 4 MG/1
4 TABLET, ORALLY DISINTEGRATING ORAL EVERY 6 HOURS PRN
Status: DISCONTINUED | OUTPATIENT
Start: 2024-08-31 | End: 2024-09-01 | Stop reason: HOSPADM

## 2024-08-31 RX ORDER — ACETAMINOPHEN 325 MG/1
650 TABLET ORAL EVERY 4 HOURS PRN
Status: DISCONTINUED | OUTPATIENT
Start: 2024-08-31 | End: 2024-09-01 | Stop reason: HOSPADM

## 2024-08-31 RX ORDER — NITROGLYCERIN 0.4 MG/1
0.4 TABLET SUBLINGUAL
Status: DISCONTINUED | OUTPATIENT
Start: 2024-08-31 | End: 2024-09-01 | Stop reason: HOSPADM

## 2024-08-31 RX ORDER — ACETAMINOPHEN 650 MG/1
650 SUPPOSITORY RECTAL EVERY 4 HOURS PRN
Status: DISCONTINUED | OUTPATIENT
Start: 2024-08-31 | End: 2024-09-01 | Stop reason: HOSPADM

## 2024-08-31 RX ORDER — CALCIUM CARBONATE 500 MG/1
2 TABLET, CHEWABLE ORAL 2 TIMES DAILY PRN
Status: DISCONTINUED | OUTPATIENT
Start: 2024-08-31 | End: 2024-09-01 | Stop reason: HOSPADM

## 2024-08-31 RX ORDER — POLYETHYLENE GLYCOL 3350 17 G/17G
17 POWDER, FOR SOLUTION ORAL DAILY PRN
Status: DISCONTINUED | OUTPATIENT
Start: 2024-08-31 | End: 2024-09-01 | Stop reason: HOSPADM

## 2024-08-31 RX ORDER — BISACODYL 10 MG
10 SUPPOSITORY, RECTAL RECTAL DAILY PRN
Status: DISCONTINUED | OUTPATIENT
Start: 2024-08-31 | End: 2024-09-01 | Stop reason: HOSPADM

## 2024-08-31 RX ORDER — ATORVASTATIN CALCIUM 20 MG/1
40 TABLET, FILM COATED ORAL DAILY
Status: DISCONTINUED | OUTPATIENT
Start: 2024-08-31 | End: 2024-09-01 | Stop reason: HOSPADM

## 2024-08-31 RX ORDER — PANTOPRAZOLE SODIUM 40 MG/1
40 TABLET, DELAYED RELEASE ORAL DAILY
Status: DISCONTINUED | OUTPATIENT
Start: 2024-08-31 | End: 2024-09-01 | Stop reason: HOSPADM

## 2024-08-31 RX ORDER — SODIUM CHLORIDE 0.9 % (FLUSH) 0.9 %
10 SYRINGE (ML) INJECTION AS NEEDED
Status: DISCONTINUED | OUTPATIENT
Start: 2024-08-31 | End: 2024-09-01 | Stop reason: HOSPADM

## 2024-08-31 RX ORDER — BISACODYL 5 MG/1
5 TABLET, DELAYED RELEASE ORAL DAILY PRN
Status: DISCONTINUED | OUTPATIENT
Start: 2024-08-31 | End: 2024-09-01 | Stop reason: HOSPADM

## 2024-08-31 RX ORDER — ASPIRIN 81 MG/1
81 TABLET ORAL DAILY
Status: DISCONTINUED | OUTPATIENT
Start: 2024-08-31 | End: 2024-09-01 | Stop reason: HOSPADM

## 2024-08-31 RX ORDER — CARBIDOPA AND LEVODOPA 50; 200 MG/1; MG/1
1 TABLET, EXTENDED RELEASE ORAL 2 TIMES DAILY
Status: DISCONTINUED | OUTPATIENT
Start: 2024-08-31 | End: 2024-09-01 | Stop reason: HOSPADM

## 2024-08-31 RX ORDER — SODIUM CHLORIDE 9 MG/ML
40 INJECTION, SOLUTION INTRAVENOUS AS NEEDED
Status: DISCONTINUED | OUTPATIENT
Start: 2024-08-31 | End: 2024-09-01 | Stop reason: HOSPADM

## 2024-08-31 RX ORDER — SODIUM CHLORIDE 9 MG/ML
100 INJECTION, SOLUTION INTRAVENOUS CONTINUOUS
Status: DISCONTINUED | OUTPATIENT
Start: 2024-08-31 | End: 2024-09-01

## 2024-08-31 RX ORDER — ACETAMINOPHEN 160 MG/5ML
650 SOLUTION ORAL EVERY 4 HOURS PRN
Status: DISCONTINUED | OUTPATIENT
Start: 2024-08-31 | End: 2024-09-01 | Stop reason: HOSPADM

## 2024-08-31 RX ORDER — AMOXICILLIN 250 MG
2 CAPSULE ORAL 2 TIMES DAILY PRN
Status: DISCONTINUED | OUTPATIENT
Start: 2024-08-31 | End: 2024-09-01 | Stop reason: HOSPADM

## 2024-08-31 RX ORDER — METOPROLOL TARTRATE 25 MG/1
12.5 TABLET, FILM COATED ORAL 2 TIMES DAILY
Status: DISCONTINUED | OUTPATIENT
Start: 2024-08-31 | End: 2024-09-01 | Stop reason: HOSPADM

## 2024-08-31 RX ORDER — CARBIDOPA AND LEVODOPA 25; 100 MG/1; MG/1
1.5 TABLET ORAL
Status: DISCONTINUED | OUTPATIENT
Start: 2024-08-31 | End: 2024-09-01 | Stop reason: HOSPADM

## 2024-08-31 RX ORDER — ONDANSETRON 2 MG/ML
4 INJECTION INTRAMUSCULAR; INTRAVENOUS EVERY 6 HOURS PRN
Status: DISCONTINUED | OUTPATIENT
Start: 2024-08-31 | End: 2024-09-01 | Stop reason: HOSPADM

## 2024-08-31 RX ADMIN — Medication 10 ML: at 10:32

## 2024-08-31 RX ADMIN — VENLAFAXINE HYDROCHLORIDE 150 MG: 150 CAPSULE, EXTENDED RELEASE ORAL at 16:27

## 2024-08-31 RX ADMIN — CARBIDOPA AND LEVODOPA 1.5 TABLET: 25; 100 TABLET ORAL at 21:56

## 2024-08-31 RX ADMIN — ATORVASTATIN CALCIUM 40 MG: 20 TABLET, FILM COATED ORAL at 16:28

## 2024-08-31 RX ADMIN — ASPIRIN 81 MG: 81 TABLET, COATED ORAL at 16:28

## 2024-08-31 RX ADMIN — SODIUM CHLORIDE 100 ML/HR: 9 INJECTION, SOLUTION INTRAVENOUS at 10:30

## 2024-08-31 RX ADMIN — SODIUM CHLORIDE 500 ML: 9 INJECTION, SOLUTION INTRAVENOUS at 07:29

## 2024-08-31 RX ADMIN — LEVOTHYROXINE SODIUM 112 MCG: 112 TABLET ORAL at 16:28

## 2024-08-31 RX ADMIN — Medication 10 ML: at 21:55

## 2024-08-31 RX ADMIN — RIVAROXABAN 20 MG: 20 TABLET, FILM COATED ORAL at 18:30

## 2024-08-31 RX ADMIN — CARBIDOPA AND LEVODOPA 1 TABLET: 50; 200 TABLET, EXTENDED RELEASE ORAL at 21:55

## 2024-08-31 RX ADMIN — CARBIDOPA AND LEVODOPA 1.5 TABLET: 25; 100 TABLET ORAL at 16:27

## 2024-08-31 RX ADMIN — PANTOPRAZOLE SODIUM 40 MG: 40 TABLET, DELAYED RELEASE ORAL at 16:28

## 2024-08-31 RX ADMIN — METOPROLOL TARTRATE 12.5 MG: 25 TABLET, FILM COATED ORAL at 21:55

## 2024-08-31 NOTE — PLAN OF CARE
Goal Outcome Evaluation: Patient alert and oriented x4 at this time; answering all orientation questions appropriately to person, place, time and situation. Follows commands.  Room air. Requires 2 assist to BSC.  Last BM was today.  Suprapubic catheter in place to BSD.  Brief in place, however can tell staff when he has to have BM.  Brother brought in updated medication list.  VSS and call light in reach.

## 2024-08-31 NOTE — H&P
Patient Name:  Darshan Bhatt  YOB: 1945  MRN:  [id number]  Admit Date:  8/31/2024  Patient Care Team:  Provider, No Known as PCP - General  Linwood Cox MD as Consulting Physician (Urology)  Darshan Cox MD as Consulting Physician (Dermatology)  Shreyas Ortiz MD as Consulting Physician (Sports Medicine)  Michael Taylor MD as Consulting Physician (Interventional Cardiology)  Zachary Navarro MD as Consulting Physician (Neurology)  Regan Tristan APRN as Nurse Practitioner (Neurology)  Iram Villalobos APRN as Nurse Practitioner (Psychiatry)  To Todd DPM (Podiatry)      Subjective   History Present Illness     Chief Complaint   Patient presents with    Altered Mental Status       Mr. Bhatt is a 78 y.o. male with history of dementia, Parkinson's, WPW, PAF, hypothyroidism, HLD, CAD, CKD, orthostatic hypotension, suprapubic catheter that presents to [facility] due to altered mental status sent by staff at the [facility].  Staff reported he is normally alert and oriented and talkative but was not talking.  Did not talk for EMS.  Patient denied complaints.  He was found to have creatinine of 1.30, increased from 1.03 two days ago. However he did have similar readings of 1.33 on 8/26 abd 1.4 and on 8/27.  Received 500 mL 0.9 % saline bolus in the ED.  At bedtime troponin is 27, similar to prior readings.  Normal white count and UA positive for leukocytes, WBCs TNTC, he does have suprapubic catheter.    Admitted here 8/26 through 8/28 after initially presenting to ED with complaints of facial droop and left-sided weakness symptoms had resolved by arrival.  Neurology evaluated.  MRI negative for changes from prior. Carotid Doppler showing less than 50% stenosis bilaterally.  Discharged on same dose of Sinemet.  There was a recent change in his Sinemet dosing prior to this admission.  Blood pressures were elevated and thus midodrine  discontinued.  Troponin was mildly elevated with no concerns on the EKG.  Did have some chest tightness with complete resolution.  Discharged back to nursing facility with recommendations for  PT/OT.    I have seen the patient in the ED.  He is minimally interactive however alert and oriented to person place and year.  States he lives in [facility] [place].  Unable to state why he came to the hospital or who the president is.  Speech tone is very low, some mumbling but understandable.  He denies pain, shortness of breath.  Does state being weak.  Unable to obtain further ROS.    Review of Systems   Unable to perform ROS: Mental status change        Personal History     Past Medical History:   Diagnosis Date    A-fib     Allergic     BPH (benign prostatic hyperplasia)     CAD (coronary artery disease)     Cancer     skin    Erectile dysfunction     Hyperlipidemia     Hypogonadism in male     Hypothyroidism     Movement disorder     Parkinson's disease     Sleep apnea     Thyroid disease      Past Surgical History:   Procedure Laterality Date    CARDIAC CATHETERIZATION      COLONOSCOPY      INGUINAL HERNIA REPAIR      PROSTATE SURGERY      SKIN CANCER DESTRUCTION      SUPRAPUBIC TUBE PLACEMENT N/A 5/4/2020    Procedure: CYSTOSCOPY SUPRAPUBIC CATHETER PLACEMENT;  Surgeon: Linwood Cox MD;  Location: [facility];  Service: Urology;  Laterality: N/A;    THYROIDECTOMY, PARTIAL       Family History   Problem Relation Age of Onset    Heart disease Mother     Diabetes Father     Diabetes Paternal Grandmother      Social History     Tobacco Use    Smoking status: Former     Passive exposure: Past    Smokeless tobacco: Former   Vaping Use    Vaping status: Never Used   Substance Use Topics    Alcohol use: Not Currently     Comment: 2-3 / week  beer or bourbon    Drug use: Never     No current facility-administered medications on file prior to encounter.     Current Outpatient Medications on File Prior to Encounter    Medication Sig Dispense Refill    acetaminophen (TYLENOL) 650 MG 8 hr tablet Take 1 tablet by mouth Every 6 (Six) Hours As Needed for Mild Pain.      amLODIPine (NORVASC) 5 MG tablet Take 0.5 tablets by mouth Daily. 30 tablet 0    aspirin 81 MG tablet Take 1 tablet by mouth Daily.      atorvastatin (LIPITOR) 40 MG tablet TAKE 1 TABLET EVERY DAY (Patient taking differently: Take 1 tablet by mouth Daily.) 90 tablet 1    calcium carbonate (TUMS) 500 MG chewable tablet Chew 1 tablet Every 4 (Four) Hours As Needed for Indigestion or Heartburn.      carbidopa-levodopa (SINEMET)  MG per tablet Take 1.5 tablets by mouth 5 (Five) Times a Day. 530am  10am  1pm  4pm   6pm      carbidopa-levodopa CR (SINEMET CR)  MG per CR tablet Take 1 tablet by mouth 2 (Two) Times a Day. 8am  8pm      cetirizine (zyrTEC) 10 MG tablet Take 1 tablet by mouth Daily As Needed for Allergies or Rhinitis.      Cyanocobalamin (B-12) 1000 MCG sublingual tablet Place 1 tablet under the tongue Daily.      docusate sodium (COLACE) 100 MG capsule Take 1 capsule by mouth Daily As Needed for Constipation.      Ergocalciferol (Vitamin D2) 50 MCG (2000 UT) tablet Take 1 tablet by mouth Every Morning.      Lactobacillus (ACIDOPHILUS PO) Take 1 tablet by mouth Every Morning.      levothyroxine (SYNTHROID, LEVOTHROID) 112 MCG tablet Take 1 tablet by mouth Daily.      melatonin 5 MG tablet tablet Take 1 tablet by mouth At Night As Needed (Sleep).      metoprolol tartrate (LOPRESSOR) 25 MG tablet Take 1 tablet by mouth 2 (Two) Times a Day.      mupirocin (BACTROBAN) 2 % cream Apply  topically to the appropriate area as directed 2 (Two) Times a Day. (Patient taking differently: Apply 1 Application topically to the appropriate area as directed 2 (Two) Times a Day As Needed.) 30 g 0    nitroglycerin (NITROSTAT) 0.3 MG SL tablet Place 1 tablet under the tongue Every 5 (Five) Minutes As Needed for Chest Pain.      pantoprazole (PROTONIX) 40 MG EC tablet  TAKE 1 TABLET EVERY DAY (Patient taking differently: Take 1 tablet by mouth Daily.) 90 tablet 1    Pimavanserin Tartrate (Nuplazid) 34 MG capsule Take 1 capsule by mouth Daily.      polyethylene glycol (MIRALAX) 17 g packet Take 17 g by mouth Daily As Needed.      rivaroxaban (XARELTO) 20 MG tablet Take 1 tablet by mouth Daily.      traZODone (DESYREL) 50 MG tablet Take 1 tablet by mouth Every Night.      venlafaxine XR (EFFEXOR-XR) 150 MG 24 hr capsule Take 1 capsule by mouth Daily.       Allergies   Allergen Reactions    Meloxicam Nausea Only       Objective    Objective     Vital Signs  Temp:  [98 °F (36.7 °C)] 98 °F (36.7 °C)  Heart Rate:  [64-93] 93  Resp:  [16] 16  BP: (143-174)/() 157/92  SpO2:  [94 %-97 %] 94 %  on   ;   Device (Oxygen Therapy): room air  Body mass index is 27.12 kg/m².    Physical Exam  Constitutional:       General: He is not in acute distress.     Appearance: He is obese. He is not ill-appearing or toxic-appearing.      Comments: Drowsy, wakes to verbal stimuli. Minimally interactive, speaks few works. Speech tone low and quite, some mumbling but understandable. Chronically ill-appearing   HENT:      Head: Normocephalic and atraumatic.      Nose: Nose normal.      Mouth/Throat:      Mouth: Mucous membranes are dry.   Eyes:      Extraocular Movements: Extraocular movements intact.      Conjunctiva/sclera: Conjunctivae normal.      Pupils: Pupils are equal, round, and reactive to light.      Comments: Positive nystagmus to the left   Neck:      Comments: Slow movement  Cardiovascular:      Rate and Rhythm: Normal rate. Rhythm irregular.      Heart sounds: Normal heart sounds. No murmur heard.     Comments: Right and left DP pulses palpable & diminished, +1, PT pulses diminished, +1.  Radial pulse palpable, +2  No edema  Pulmonary:      Effort: No respiratory distress.      Comments: Breath sounds diminished throughout.  No cough.  On room air.  Shallow breathing, even and  unlabored  Abdominal:      General: There is no distension.      Palpations: Abdomen is soft.      Tenderness: There is no abdominal tenderness.      Comments: Hypoactive BS   Genitourinary:     Comments: Suprapubic catheter in place without drainage bag, clamped. No palpable bladder distention.   Musculoskeletal:         General: No swelling or tenderness.      Cervical back: Normal range of motion.      Comments: Decreased power and rigidity in bilateral upper and lower extremities, symmetrical strength in both.  Strength in BLE 2/5.  Strength in BUE 3/5.   Hand grasp equal   Skin:     General: Skin is warm and dry.   Neurological:      General: No focal deficit present.   Psychiatric:      Comments: Flat.  Cooperative         Results Review:  I reviewed the patient's new clinical results.      Lab Results (last 24 hours)       Procedure Component Value Units Date/Time    CBC & Differential [[id number]]  (Abnormal) Collected: 08/31/24 0458    Specimen: Blood Updated: 08/31/24 0520    Narrative:      The following orders were created for panel order CBC & Differential.  Procedure                               Abnormality         Status                     ---------                               -----------         ------                     CBC Auto Differential[[id number]]        Abnormal            Final result                 Please view results for these tests on the individual orders.    Comprehensive Metabolic Panel [[id number]]  (Abnormal) Collected: 08/31/24 0458    Specimen: Blood Updated: 08/31/24 0541     Glucose 96 mg/dL      BUN 30 mg/dL      Creatinine 1.30 mg/dL      Sodium 138 mmol/L      Potassium 4.1 mmol/L      Comment: Slight hemolysis detected by analyzer. Result may be falsely elevated.        Chloride 100 mmol/L      CO2 28.0 mmol/L      Calcium 8.9 mg/dL      Total Protein 6.4 g/dL      Albumin 3.8 g/dL      ALT (SGPT) <5 U/L      AST (SGOT) 12 U/L      Alkaline Phosphatase 62 U/L      Total  Bilirubin 0.5 mg/dL      Globulin 2.6 gm/dL      A/G Ratio 1.5 g/dL      BUN/Creatinine Ratio 23.1     Anion Gap 10.0 mmol/L      eGFR 55.5 mL/min/1.73     Narrative:      GFR Normal >60  Chronic Kidney Disease <60  Kidney Failure <15    The GFR formula is only valid for adults with stable renal function between ages 18 and 70.    aPTT [[id number]]  (Normal) Collected: 08/31/24 0458    Specimen: Blood Updated: 08/31/24 0529     PTT 34.2 seconds     Protime-INR [[id number]]  (Abnormal) Collected: 08/31/24 0458    Specimen: Blood Updated: 08/31/24 0529     Protime 19.4 Seconds      INR 1.61    Single High Sensitivity Troponin T [[id number]]  (Abnormal) Collected: 08/31/24 0458    Specimen: Blood Updated: 08/31/24 0540     HS Troponin T 27 ng/L     Narrative:      High Sensitive Troponin T Reference Range:  <14.0 ng/L- Negative Female for AMI  <22.0 ng/L- Negative Male for AMI  >=14 - Abnormal Female indicating possible myocardial injury.  >=22 - Abnormal Male indicating possible myocardial injury.   Clinicians would have to utilize clinical acumen, EKG, Troponin, and serial changes to determine if it is an Acute Myocardial Infarction or myocardial injury due to an underlying chronic condition.         Magnesium [[id number]]  (Normal) Collected: 08/31/24 0458    Specimen: Blood Updated: 08/31/24 0541     Magnesium 1.9 mg/dL     CBC Auto Differential [[id number]]  (Abnormal) Collected: 08/31/24 0458    Specimen: Blood Updated: 08/31/24 0520     WBC 5.88 10*3/mm3      RBC 4.08 10*6/mm3      Hemoglobin 12.5 g/dL      Hematocrit 38.5 %      MCV 94.4 fL      MCH 30.6 pg      MCHC 32.5 g/dL      RDW 13.0 %      RDW-SD 44.6 fl      MPV 10.7 fL      Platelets 147 10*3/mm3      Neutrophil % 46.9 %      Lymphocyte % 32.3 %      Monocyte % 15.0 %      Eosinophil % 5.3 %      Basophil % 0.3 %      Immature Grans % 0.2 %      Neutrophils, Absolute 2.76 10*3/mm3      Lymphocytes, Absolute 1.90 10*3/mm3      Monocytes, Absolute 0.88  "10*3/mm3      Eosinophils, Absolute 0.31 10*3/mm3      Basophils, Absolute 0.02 10*3/mm3      Immature Grans, Absolute 0.01 10*3/mm3      nRBC 0.0 /100 WBC     TSH Rfx On Abnormal To Free T4 [[id number]]  (Abnormal) Collected: 08/31/24 0458    Specimen: Blood Updated: 08/31/24 1110     TSH 5.040 uIU/mL     Procalcitonin [[id number]]  (Normal) Collected: 08/31/24 0458    Specimen: Blood Updated: 08/31/24 1132     Procalcitonin 0.10 ng/mL     Narrative:      As a Marker for Sepsis (Non-Neonates):    1. <0.5 ng/mL represents a low risk of severe sepsis and/or septic shock.  2. >2 ng/mL represents a high risk of severe sepsis and/or septic shock.    As a Marker for Lower Respiratory Tract Infections that require antibiotic therapy:    PCT on Admission    Antibiotic Therapy       6-12 Hrs later    >0.5                Strongly Recommended  >0.25 - <0.5        Recommended   0.1 - 0.25          Discouraged              Remeasure/reassess PCT  <0.1                Strongly Discouraged     Remeasure/reassess PCT    As 28 day mortality risk marker: \"Change in Procalcitonin Result\" (>80% or <=80%) if Day 0 (or Day 1) and Day 4 values are available. Refer to http://www.[facility]-pct-calculator.com    Change in PCT <=80%  A decrease of PCT levels below or equal to 80% defines a positive change in PCT test result representing a higher risk for 28-day all-cause mortality of patients diagnosed with severe sepsis for septic shock.    Change in PCT >80%  A decrease of PCT levels of more than 80% defines a negative change in PCT result representing a lower risk for 28-day all-cause mortality of patients diagnosed with severe sepsis or septic shock.       T4, Free [[id number]]  (Normal) Collected: 08/31/24 0458    Specimen: Blood Updated: 08/31/24 1132     Free T4 1.46 ng/dL     Urinalysis With Microscopic If Indicated (No Culture) - Urine, Catheter [[id number]]  (Abnormal) Collected: 08/31/24 0517    Specimen: Urine, Catheter Updated: 08/31/24 " 0528     Color, UA Yellow     Appearance, UA Clear     pH, UA 5.5     Specific Gravity, UA 1.017     Glucose, UA Negative     Ketones, UA Negative     Bilirubin, UA Negative     Blood, UA Negative     Protein, UA Trace     Leuk Esterase, UA Large (3+)     Nitrite, UA Negative     Urobilinogen, UA 1.0 E.U./dL    Urinalysis, Microscopic Only - Urine, Catheter [[id number]]  (Abnormal) Collected: 08/31/24 0517    Specimen: Urine, Catheter Updated: 08/31/24 0533     RBC, UA 0-2 /HPF      WBC, UA Too Numerous to Count /HPF      Bacteria, UA None Seen /HPF      Squamous Epithelial Cells, UA 0-2 /HPF      Hyaline Casts, UA None Seen /LPF      Methodology Automated Microscopy            Imaging Results (Last 24 Hours)       Procedure Component Value Units Date/Time    CT Head Without Contrast [[id number]] Collected: 08/31/24 0718     Updated: 08/31/24 0803    Narrative:      CT HEAD WITHOUT CONTRAST     HISTORY: Altered mental status.     TECHNIQUE:  Head CT includes axial imaging from the base of skull to the  vertex without intravenous contrast. Radiation dose reduction techniques  were utilized, including automated exposure control and exposure  modulation based on body size.     COMPARISON: MRI brain 08/27/2024, CT head 08/26/2024     FINDINGS: There are no abnormal areas of increased attenuation intra  axially to suggest hemorrhage. No extra-axial fluid collection is  observed. There is no evidence for cerebral edema or mass effect or  shift of the midline structures. Ventricles appear within normal limits  for size and configuration. Mastoid air cells and visualized paranasal  sinuses appear clear.       Impression:      No evidence for acute intracranial abnormality.     This report was finalized on 8/31/2024 8:00 AM by Owen Edwards M.D  on Workstation: [id number]       XR Chest 1 View [[id number]] Collected: 08/31/24 0423     Updated: 08/31/24 0427    Narrative:      SINGLE VIEW OF THE CHEST     HISTORY: Altered  mental status     COMPARISON: None available.     FINDINGS:  There is cardiomegaly. There is no vascular congestion. No pneumothorax  or pleural effusion is seen. No acute infiltrates are seen.       Impression:      No acute findings.     This report was finalized on 8/31/2024 4:24 AM by Dr. Gracie Matute M.D on Workstation: [id number]                   No orders to display        Assessment/Plan     Active Hospital Problems    Diagnosis  POA    **Altered mental status [R41.82]  Yes    CKD (chronic kidney disease) [N18.9]  Unknown    ADELINA (acute kidney injury) [N17.9]  Unknown    Abnormal finding on urinalysis [R82.90]  Unknown    Elevated troponin [R79.89]  Unknown    Dementia without behavioral disturbance [F03.90]  Yes    WPW (Sammy-Parkinson-White syndrome) [I45.6]  Yes    Parkinson disease [G20.A1]  Yes    Chronic anticoagulation [Z79.01]  Not Applicable    Benign prostatic hyperplasia [N40.0]  Yes    Coronary arteriosclerosis in native artery [I25.10]  Yes    Hyperlipidemia [E78.5]  Yes    Hypothyroidism [E03.9]  Yes    PAF (paroxysmal atrial fibrillation) [I48.0]  Yes      Resolved Hospital Problems   No resolved problems to display.         AMS in setting of ADELINA with history of dementia, parkinsons. Diff Dx worsening dementia vs worsening parkinsons vs fatigue/hydration related vs TIA considering he had transient left sided weakness and facial droop on last presentation though less likely with recent work up. No focal deficits, profoundly symmetrically weak. Possibly  weak from hospitalization and wasn't taking in enough fluids, he does look dry. Continue fluids, repeat labs. Resume diet.  Check TSH with reflex free T4, ammonia level, B12, folate, UDS.  Speech consult to assess cognition/communication. Consult neurology.     Continue normal saline at 100 mL an hour.  Repeat labs in AM.    U/A + WBC TNTC, normal WBC w/ chronic suprapubic cath. Does not appear infectious. Check procalcitonin.     No CP  though limited historian. Troponin unchanged from prior.    Resume AC once medications verified by nursing. HR controlled.     Resume sinemet once medications verified by nursing.     PT/OT/CCP consults.    IS for prophylactic pulmonary hygiene with decreased lung sounds, shallow breathing.      VTE Prophylaxis - SCDs.  Code Status - DNR       ROSEMARIE Schmidt  [facility]  08/31/24  11:33 EDT

## 2024-08-31 NOTE — ED PROVIDER NOTES
ED Course as of 08/31/24 0823   Sat Aug 31, 2024   [id number] I discussed the case with Dr. Suarez and he agrees to evaluate the patient at the bedside.    [AR]   0549 Creatinine(!): 1.30  Increased from 2 days ago when creatinine level was 1.03 [AR]   0549 HS Troponin T(!): 27  Stable compared to previous. [AR]   0549 Leukocytes, UA(!): Large (3+) [AR]   0549 WBC, UA(!): Too Numerous to Count [AR]   0549 Bacteria, UA: None Seen [AR]   0600 Pt care assumed  [MP]   0711 CT scan of head independently interpreted by me as no gross hemorrhage [MP]   0821 I spoke with Elizabeth with A.  Discussed patient presentation and ED findings.  She agrees admit patient to a telemetry observation bed to the service of Dr. Smith. [MP]      ED Course User Index  [AR] Lauren Thorne APRN  [MP] Mable Leos PA-C Pleiss, Melanie M, PA-C  08/31/24 0823

## 2024-08-31 NOTE — ED PROVIDER NOTES
EMERGENCY DEPARTMENT ENCOUNTER  Room Number:  01/01  PCP: Blanca Hooper MD  Independent Historians: Patient and EMS      HPI:  Chief Complaint: had concerns including Altered Mental Status.     A complete HPI/ROS/PMH/PSH/SH/FH are unobtainable due to: None    Chronic or social conditions impacting patient care (Social Determinants of Health): None      Context: Darshan Bhatt is a 78 y.o. male who presents to the emergency department for altered mental status.  Patient resides at the [facility].  EMS reports the facility stated the patient had been experiencing altered mental status for the last 4 hours.  EMS advises the facility informed the patient is normally alert and talkative however was not talking.  Apparently the patient was not speaking with the EMS crew as well.  Patient advises he does not know what happened. fever, chills, headache, lightheadedness, dizziness, numbness, tingling, unilateral extremity weakness, syncope, shortness of breath, chest pain, abdominal pain, nausea, vomiting, diarrhea, dysuria, hematuria, or other complaints.        PAST MEDICAL HISTORY  Active Ambulatory Problems     Diagnosis Date Noted    PAF (paroxysmal atrial fibrillation) 06/21/2016    Anxiety 06/21/2016    Coronary arteriosclerosis in native artery 06/21/2016    Benign prostatic hyperplasia 06/21/2016    Depression 06/21/2016    Dizziness 06/21/2016    Hemorrhoids 06/21/2016    Hyperlipidemia 06/21/2016    Hypothyroidism 06/21/2016    Malignant melanoma of skin 06/21/2016    Sleep apnea 06/21/2016    Vitamin B12 deficiency 12/06/2016    Angiodysplasia of colon without bleeding 11/28/2017    Chronic anticoagulation 11/26/2017    IFG (impaired fasting glucose) 02/20/2019    Orthostatic hypotension 05/15/2019    Parkinson disease 05/24/2019    BPH with urinary obstruction 04/08/2020    Urinary retention 05/04/2020    Left-sided weakness 08/26/2024    Dementia without behavioral disturbance 08/26/2024    WPW  (Sammy-Parkinson-White syndrome) 08/26/2024     Resolved Ambulatory Problems     Diagnosis Date Noted    Rib pain on left side 01/26/2017    Fall from bed 01/26/2017    Sinusitis 03/14/2017    Stercoral ulcer of rectum 11/28/2017     Past Medical History:   Diagnosis Date    A-fib     Allergic     BPH (benign prostatic hyperplasia)     CAD (coronary artery disease)     Cancer     Erectile dysfunction     Hypogonadism in male     Movement disorder     Parkinson's disease     Thyroid disease          PAST SURGICAL HISTORY  Past Surgical History:   Procedure Laterality Date    CARDIAC CATHETERIZATION      COLONOSCOPY      INGUINAL HERNIA REPAIR      PROSTATE SURGERY      SKIN CANCER DESTRUCTION      SUPRAPUBIC TUBE PLACEMENT N/A 5/4/2020    Procedure: CYSTOSCOPY SUPRAPUBIC CATHETER PLACEMENT;  Surgeon: Linwood Cox MD;  Location: [facility];  Service: Urology;  Laterality: N/A;    THYROIDECTOMY, PARTIAL           FAMILY HISTORY  Family History   Problem Relation Age of Onset    Heart disease Mother     Diabetes Father     Diabetes Paternal Grandmother          SOCIAL HISTORY  Social History     Socioeconomic History    Marital status:    Tobacco Use    Smoking status: Former     Passive exposure: Past    Smokeless tobacco: Former   Vaping Use    Vaping status: Never Used   Substance and Sexual Activity    Alcohol use: Not Currently     Comment: 2-3 / week  beer or bourbon    Drug use: Never    Sexual activity: Defer         ALLERGIES  Meloxicam      REVIEW OF SYSTEMS  Included in HPI  All systems reviewed and negative except for those discussed in HPI.      PHYSICAL EXAM    I have reviewed the triage vital signs and nursing notes.    ED Triage Vitals [08/31/24 0342]   Temp Heart Rate Resp BP SpO2   98 °F (36.7 °C) 85 16 168/91 --      Temp src Heart Rate Source Patient Position BP Location FiO2 (%)   -- -- -- -- --       GENERAL: not distressed  HENT: nares patent  Head/neck/ face are symmetric  without gross deformity or swelling  EYES: no scleral icterus  CV: regular rhythm, regular rate with intact distal pulses  RESPIRATORY: normal effort and no respiratory distress  ABDOMEN: soft and nontender  MUSCULOSKELETAL: no deformity  NEURO: alert and appropriate, moves all extremities, follows commands  SKIN: warm, dry    Vital signs and nursing notes reviewed.  ***    LAB RESULTS  Recent Results (from the past 24 hour(s))   Comprehensive Metabolic Panel    Collection Time: 08/31/24  4:58 AM    Specimen: Blood   Result Value Ref Range    Glucose 96 65 - 99 mg/dL    BUN 30 (H) 8 - 23 mg/dL    Creatinine 1.30 (H) 0.76 - 1.27 mg/dL    Sodium 138 136 - 145 mmol/L    Potassium 4.1 3.5 - 5.2 mmol/L    Chloride 100 98 - 107 mmol/L    CO2 28.0 22.0 - 29.0 mmol/L    Calcium 8.9 8.6 - 10.5 mg/dL    Total Protein 6.4 6.0 - 8.5 g/dL    Albumin 3.8 3.5 - 5.2 g/dL    ALT (SGPT) <5 1 - 41 U/L    AST (SGOT) 12 1 - 40 U/L    Alkaline Phosphatase 62 39 - 117 U/L    Total Bilirubin 0.5 0.0 - 1.2 mg/dL    Globulin 2.6 gm/dL    A/G Ratio 1.5 g/dL    BUN/Creatinine Ratio 23.1 7.0 - 25.0    Anion Gap 10.0 5.0 - 15.0 mmol/L    eGFR 55.5 (L) >60.0 mL/min/1.73   aPTT    Collection Time: 08/31/24  4:58 AM    Specimen: Blood   Result Value Ref Range    PTT 34.2 22.7 - 35.4 seconds   Protime-INR    Collection Time: 08/31/24  4:58 AM    Specimen: Blood   Result Value Ref Range    Protime 19.4 (H) 11.7 - 14.2 Seconds    INR 1.61 (H) 0.90 - 1.10   Single High Sensitivity Troponin T    Collection Time: 08/31/24  4:58 AM    Specimen: Blood   Result Value Ref Range    HS Troponin T 27 (H) <22 ng/L   Magnesium    Collection Time: 08/31/24  4:58 AM    Specimen: Blood   Result Value Ref Range    Magnesium 1.9 1.6 - 2.4 mg/dL   CBC Auto Differential    Collection Time: 08/31/24  4:58 AM    Specimen: Blood   Result Value Ref Range    WBC 5.88 3.40 - 10.80 10*3/mm3    RBC 4.08 (L) 4.14 - 5.80 10*6/mm3    Hemoglobin 12.5 (L) 13.0 - 17.7 g/dL     Hematocrit 38.5 37.5 - 51.0 %    MCV 94.4 79.0 - 97.0 fL    MCH 30.6 26.6 - 33.0 pg    MCHC 32.5 31.5 - 35.7 g/dL    RDW 13.0 12.3 - 15.4 %    RDW-SD 44.6 37.0 - 54.0 fl    MPV 10.7 6.0 - 12.0 fL    Platelets 147 140 - 450 10*3/mm3    Neutrophil % 46.9 42.7 - 76.0 %    Lymphocyte % 32.3 19.6 - 45.3 %    Monocyte % 15.0 (H) 5.0 - 12.0 %    Eosinophil % 5.3 0.3 - 6.2 %    Basophil % 0.3 0.0 - 1.5 %    Immature Grans % 0.2 0.0 - 0.5 %    Neutrophils, Absolute 2.76 1.70 - 7.00 10*3/mm3    Lymphocytes, Absolute 1.90 0.70 - 3.10 10*3/mm3    Monocytes, Absolute 0.88 0.10 - 0.90 10*3/mm3    Eosinophils, Absolute 0.31 0.00 - 0.40 10*3/mm3    Basophils, Absolute 0.02 0.00 - 0.20 10*3/mm3    Immature Grans, Absolute 0.01 0.00 - 0.05 10*3/mm3    nRBC 0.0 0.0 - 0.2 /100 WBC   Urinalysis With Microscopic If Indicated (No Culture) - Urine, Catheter    Collection Time: 08/31/24  5:17 AM    Specimen: Urine, Catheter   Result Value Ref Range    Color, UA Yellow Yellow, Straw    Appearance, UA Clear Clear    pH, UA 5.5 5.0 - 8.0    Specific Gravity, UA 1.017 1.005 - 1.030    Glucose, UA Negative Negative    Ketones, UA Negative Negative    Bilirubin, UA Negative Negative    Blood, UA Negative Negative    Protein, UA Trace (A) Negative    Leuk Esterase, UA Large (3+) (A) Negative    Nitrite, UA Negative Negative    Urobilinogen, UA 1.0 E.U./dL 0.2 - 1.0 E.U./dL   Urinalysis, Microscopic Only - Urine, Catheter    Collection Time: 08/31/24  5:17 AM    Specimen: Urine, Catheter   Result Value Ref Range    RBC, UA 0-2 None Seen, 0-2 /HPF    WBC, UA Too Numerous to Count (A) None Seen, 0-2 /HPF    Bacteria, UA None Seen None Seen /HPF    Squamous Epithelial Cells, UA 0-2 None Seen, 0-2 /HPF    Hyaline Casts, UA None Seen None Seen /LPF    Methodology Automated Microscopy          RADIOLOGY  XR Chest 1 View    Result Date: 8/31/2024  SINGLE VIEW OF THE CHEST  HISTORY: Altered mental status  COMPARISON: None available.  FINDINGS: There is  cardiomegaly. There is no vascular congestion. No pneumothorax or pleural effusion is seen. No acute infiltrates are seen.      No acute findings.  This report was finalized on 8/31/2024 4:24 AM by Dr. Gracie Matute M.D on Workstation: [id number]         MEDICATIONS GIVEN IN ER  Medications   sodium chloride 0.9 % flush 10 mL (has no administration in time range)           OUTPATIENT MEDICATION MANAGEMENT:  Current Facility-Administered Medications Ordered in Epic   Medication Dose Route Frequency Provider Last Rate Last Admin    sodium chloride 0.9 % flush 10 mL  10 mL Intravenous PRN Lauren Thorne APRN         Current Outpatient Medications Ordered in Epic   Medication Sig Dispense Refill    acetaminophen (TYLENOL) 650 MG 8 hr tablet Take 1 tablet by mouth Every 6 (Six) Hours As Needed for Mild Pain.      amLODIPine (NORVASC) 5 MG tablet Take 0.5 tablets by mouth Daily. 30 tablet 0    aspirin 81 MG tablet Take 1 tablet by mouth Daily.      atorvastatin (LIPITOR) 40 MG tablet TAKE 1 TABLET EVERY DAY (Patient taking differently: Take 1 tablet by mouth Daily.) 90 tablet 1    calcium carbonate (TUMS) 500 MG chewable tablet Chew 1 tablet Every 4 (Four) Hours As Needed for Indigestion or Heartburn.      carbidopa-levodopa (SINEMET)  MG per tablet Take 1.5 tablets by mouth 5 (Five) Times a Day. 530am  10am  1pm  4pm   6pm      carbidopa-levodopa CR (SINEMET CR)  MG per CR tablet Take 1 tablet by mouth 2 (Two) Times a Day. 8am  8pm      cetirizine (zyrTEC) 10 MG tablet Take 1 tablet by mouth Daily As Needed for Allergies or Rhinitis.      Cyanocobalamin (B-12) 1000 MCG sublingual tablet Place 1 tablet under the tongue Daily.      docusate sodium (COLACE) 100 MG capsule Take 1 capsule by mouth Daily As Needed for Constipation.      Ergocalciferol (Vitamin D2) 50 MCG (2000 UT) tablet Take 1 tablet by mouth Every Morning.      Lactobacillus (ACIDOPHILUS PO) Take 1 tablet by mouth Every Morning.       levothyroxine (SYNTHROID, LEVOTHROID) 112 MCG tablet Take 1 tablet by mouth Daily.      melatonin 5 MG tablet tablet Take 1 tablet by mouth At Night As Needed (Sleep).      metoprolol tartrate (LOPRESSOR) 25 MG tablet Take 1 tablet by mouth 2 (Two) Times a Day.      mupirocin (BACTROBAN) 2 % cream Apply  topically to the appropriate area as directed 2 (Two) Times a Day. (Patient taking differently: Apply 1 Application topically to the appropriate area as directed 2 (Two) Times a Day As Needed.) 30 g 0    nitroglycerin (NITROSTAT) 0.3 MG SL tablet Place 1 tablet under the tongue Every 5 (Five) Minutes As Needed for Chest Pain.      pantoprazole (PROTONIX) 40 MG EC tablet TAKE 1 TABLET EVERY DAY (Patient taking differently: Take 1 tablet by mouth Daily.) 90 tablet 1    Pimavanserin Tartrate (Nuplazid) 34 MG capsule Take 1 capsule by mouth Daily.      polyethylene glycol (MIRALAX) 17 g packet Take 17 g by mouth Daily As Needed.      rivaroxaban (XARELTO) 20 MG tablet Take 1 tablet by mouth Daily.      traZODone (DESYREL) 50 MG tablet Take 1 tablet by mouth Every Night.      venlafaxine XR (EFFEXOR-XR) 150 MG 24 hr capsule Take 1 capsule by mouth Daily.           PROCEDURES  Procedures        PROGRESS, DATA ANALYSIS, CONSULTS, AND MEDICAL DECISION MAKING  ORDERS PLACED DURING THIS VISIT:  Orders Placed This Encounter   Procedures    XR Chest 1 View    CT Head Without Contrast    Comprehensive Metabolic Panel    Urinalysis With Microscopic If Indicated (No Culture) - Urine, Catheter    aPTT    Protime-INR    Single High Sensitivity Troponin T    Magnesium    CBC Auto Differential    Urinalysis, Microscopic Only - Urine, Clean Catch    Insert Peripheral IV    CBC & Differential       All labs have been independently interpreted by me.  All radiology studies have been reviewed by me. All EKG's have been independently viewed by me.  Discussion below represents my analysis of pertinent findings related to patient's  condition, differential diagnosis, treatment plan and final disposition.    Differential diagnosis includes but is not limited to:   ***    ED Course/Progress Notes/MDM:  ED Course as of 08/31/24 0552   Sat Aug 31, 2024   5877 I discussed the case with Dr. Suarez and he agrees to evaluate the patient at the bedside.    [AR]   0549 Creatinine(!): 1.30  Increased from 2 days ago when creatinine level was 1.03 [AR]   0549 HS Troponin T(!): 27  Stable compared to previous. [AR]   0549 Leukocytes, UA(!): Large (3+) [AR]   0549 WBC, UA(!): Too Numerous to Count [AR]   0549 Bacteria, UA: None Seen [AR]      ED Course User Index  [AR] Lauren Thorne APRN           AS OF 05:52 EDT VITALS:    BP - 155/99  HR - 75  TEMP - 98 °F (36.7 °C)  O2 SATS - 97%    Clinical Scores:                  MDM:  ***      COMPLEXITY OF CARE  {Admission Statement:33930}    {EM_CC (Optional):82360}    DIAGNOSIS  Final diagnoses:   None         DISPOSITION  ED Disposition       ED Disposition   Intended Admit    Condition   --    Comment   --                  {EM_PPE (Optional):91749}    Please note that portions of this document were completed with a voice recognition program.    Note Disclaimer: At [facility], we believe that sharing information builds trust and better relationships. You are receiving this note because you recently visited [facility]. It is possible you will see health information before a provider has talked with you about it. This kind of information can be easy to misunderstand. To help you fully understand what it means for your health, we urge you to discuss this note with your provider.

## 2024-08-31 NOTE — ED NOTES
Nursing report ED to floor  Darshan Bhatt  78 y.o.  male    HPI :  HPI (Adult)  Stated Reason for Visit: To ER via EMS from [facility].  To ER for AMS x 4 hours.  Pt normally alert and talkative with some episodes of confusion. Pt is alert but stares straight ahead.  Pt will make eye contact when you say his name but will not speak at time of triage.  History Obtained From: EMS    Chief Complaint  Chief Complaint   Patient presents with    Altered Mental Status       Admitting doctor:   Narendra Smith MD    Admitting diagnosis:   The primary encounter diagnosis was Altered mental status, unspecified altered mental status type. A diagnosis of ADELINA (acute kidney injury) was also pertinent to this visit.    Code status:   Current Code Status       Date Active Code Status Order ID Comments User Context       8/31/2024 0840 CPR (Attempt to Resuscitate) [id number]  Elizabeth Gross APRN ED        Question Answer    Code Status (Patient has no pulse and is not breathing) CPR (Attempt to Resuscitate)    Medical Interventions (Patient has pulse or is breathing) Full Support                    Allergies:   Meloxicam    Isolation:   No active isolations    Intake and Output    Intake/Output Summary (Last 24 hours) at 8/31/2024 0843  Last data filed at 8/31/2024 0821  Gross per 24 hour   Intake 500 ml   Output --   Net 500 ml       Weight:       08/31/24  0501   Weight: 90.7 kg (200 lb)       Most recent vitals:   Vitals:    08/31/24 0819 08/31/24 0823 08/31/24 0825 08/31/24 0826   BP: 174/95      Pulse: 87 83 70 79   Resp:       Temp:       SpO2: 95% 96% 95% 95%   Weight:       Height:           Active LDAs/IV Access:   Lines, Drains & Airways       Active LDAs       Name Placement date Placement time Site Days    Peripheral IV 08/31/24 0457 Left;Posterior Hand 08/31/24 0457  Hand  less than 1    Suprapubic Catheter 08/26/24  1500  -- 4                    Labs (abnormal labs have a star):   Labs Reviewed    COMPREHENSIVE METABOLIC PANEL - Abnormal; Notable for the following components:       Result Value    BUN 30 (*)     Creatinine 1.30 (*)     eGFR 55.5 (*)     All other components within normal limits    Narrative:     GFR Normal >60  Chronic Kidney Disease <60  Kidney Failure <15    The GFR formula is only valid for adults with stable renal function between ages 18 and 70.   URINALYSIS W/ MICROSCOPIC IF INDICATED (NO CULTURE) - Abnormal; Notable for the following components:    Protein, UA Trace (*)     Leuk Esterase, UA Large (3+) (*)     All other components within normal limits   PROTIME-INR - Abnormal; Notable for the following components:    Protime 19.4 (*)     INR 1.61 (*)     All other components within normal limits   SINGLE HS TROPONIN T - Abnormal; Notable for the following components:    HS Troponin T 27 (*)     All other components within normal limits    Narrative:     High Sensitive Troponin T Reference Range:  <14.0 ng/L- Negative Female for AMI  <22.0 ng/L- Negative Male for AMI  >=14 - Abnormal Female indicating possible myocardial injury.  >=22 - Abnormal Male indicating possible myocardial injury.   Clinicians would have to utilize clinical acumen, EKG, Troponin, and serial changes to determine if it is an Acute Myocardial Infarction or myocardial injury due to an underlying chronic condition.        CBC WITH AUTO DIFFERENTIAL - Abnormal; Notable for the following components:    RBC 4.08 (*)     Hemoglobin 12.5 (*)     Monocyte % 15.0 (*)     All other components within normal limits   URINALYSIS, MICROSCOPIC ONLY - Abnormal; Notable for the following components:    WBC, UA Too Numerous to Count (*)     All other components within normal limits   APTT - Normal   MAGNESIUM - Normal   CBC AND DIFFERENTIAL    Narrative:     The following orders were created for panel order CBC & Differential.  Procedure                               Abnormality         Status                     ---------                                -----------         ------                     CBC Auto Differential[[id number]]        Abnormal            Final result                 Please view results for these tests on the individual orders.       EKG:   No orders to display       Meds given in ED:   Medications   sodium chloride 0.9 % flush 10 mL (has no administration in time range)   sodium chloride 0.9 % flush 10 mL (has no administration in time range)   sodium chloride 0.9 % flush 10 mL (has no administration in time range)   sodium chloride 0.9 % infusion 40 mL (has no administration in time range)   nitroglycerin (NITROSTAT) SL tablet 0.4 mg (has no administration in time range)   Potassium Replacement - Follow Nurse / BPA Driven Protocol (has no administration in time range)   acetaminophen (TYLENOL) tablet 650 mg (has no administration in time range)     Or   acetaminophen (TYLENOL) 160 MG/5ML oral solution 650 mg (has no administration in time range)     Or   acetaminophen (TYLENOL) suppository 650 mg (has no administration in time range)   sennosides-docusate (PERICOLACE) 8.6-50 MG per tablet 2 tablet (has no administration in time range)     And   polyethylene glycol (MIRALAX) packet 17 g (has no administration in time range)     And   bisacodyl (DULCOLAX) EC tablet 5 mg (has no administration in time range)     And   bisacodyl (DULCOLAX) suppository 10 mg (has no administration in time range)   ondansetron ODT (ZOFRAN-ODT) disintegrating tablet 4 mg (has no administration in time range)     Or   ondansetron (ZOFRAN) injection 4 mg (has no administration in time range)   calcium carbonate (TUMS) chewable tablet 500 mg (200 mg elemental) (has no administration in time range)   sodium chloride 0.9 % bolus 500 mL (0 mL Intravenous Stopped 8/31/24 0821)       Imaging results:  CT Head Without Contrast    Result Date: 8/31/2024  No evidence for acute intracranial abnormality.  This report was finalized on 8/31/2024 8:00 AM by  Owen Edwards M.D on Workstation: [id number]      XR Chest 1 View    Result Date: 2024  No acute findings.  This report was finalized on 2024 4:24 AM by Dr. Gracie Matute M.D on Workstation: [id number]       Ambulatory status:   -     Social issues:   Social History     Socioeconomic History    Marital status:    Tobacco Use    Smoking status: Former     Passive exposure: Past    Smokeless tobacco: Former   Vaping Use    Vaping status: Never Used   Substance and Sexual Activity    Alcohol use: Not Currently     Comment: 2-3 / week  beer or bourbon    Drug use: Never    Sexual activity: Defer       Peripheral Neurovascular  Peripheral Neurovascular (Adult)  Peripheral Neurovascular WDL: WDL    Neuro Cognitive  Neuro Cognitive (Adult)  Cognitive/Neuro/Behavioral WDL: WDL  [place] Coma Scale  Best Eye Response: 4-->(E4) spontaneous  Best Motor Response: 6-->(M6) obeys commands  Best Verbal Response: 5-->(V5) oriented  [place] Coma Scale Score: 15    Learning  Learning Assessment (Adult)  Learning Readiness and Ability: no barriers identified    Respiratory  Respiratory WDL  Respiratory WDL: WDL    Abdominal Pain       Pain Assessments  Pain (Adult)  Preferred Pain Scale: PAINAD (Pain Assessment in Advance Dementia Scale)  PAINAD Breathin-->normal  PAINAD Negative Vocalization: 0-->none  PAINAD Facial Expression: 0-->smiling or inexpressive  PAINAD Body Language: 0-->relaxed  PAINAD Consolability: 0-->no need to console  PAINAD Score: 0           Caro Vincent RN  24 08:43 EDT

## 2024-08-31 NOTE — PLAN OF CARE
Received orders for OLIVERIO. Pt has baseline dementia and PD. Attempted evaluation, however pt politely declined with encouragement and education from SLP. Pt then became fixated on subject of discharging and unable to redirect. Will re-attempt evaluation in the upcoming days as appropriate.

## 2024-08-31 NOTE — ED PROVIDER NOTES
MD ATTESTATION NOTE    SHARED VISIT: This visit was performed by BOTH a physician and an APC. The substantive portion of the medical decision making was performed by this attesting physician who made or approved the management plan and takes responsibility for patient management. All studies documented in the APC note (if performed) were independently interpreted by me.    The JACKELINE and I have discussed this patient's history, physical exam, MDM, and treatment plan.  I have reviewed the documentation and personally had a face to face interaction with the patient. The attached note describes my personal findings.      Darshan Bhatt is a 78 y.o. male who presents to the ED c/o acute altered mental status.  Patient apparently was not speaking at home.  Assisted living reports altered for approximately 4 hours.  Here patient is able to answer questions and speak patient reports that he felt like he was confused earlier.  With history of A-fib.  Anticoagulated with [place].    On exam:  GENERAL: not distressed  HENT: nares patent  EYES: no scleral icterus  CV: regular rhythm, regular rate  RESPIRATORY: normal effort  ABDOMEN: soft  MUSCULOSKELETAL: no deformity  NEURO: alert, moves all extremities, follows commands  SKIN: warm, dry    Labs  Recent Results (from the past 24 hour(s))   Ammonia    Collection Time: 08/31/24 11:48 AM    Specimen: Blood   Result Value Ref Range    Ammonia <10 (L) 16 - 60 umol/L   Urine Drug Screen - Indwelling Urethral Catheter    Collection Time: 08/31/24 12:16 PM    Specimen: Indwelling Urethral Catheter; Urine   Result Value Ref Range    Amphet/Methamphet, Screen Negative Negative    Barbiturates Screen, Urine Negative Negative    Benzodiazepine Screen, Urine Negative Negative    Cocaine Screen, Urine Negative Negative    Opiate Screen Negative Negative    THC, Screen, Urine Negative Negative    Methadone Screen, Urine Negative Negative    Oxycodone Screen, Urine Negative Negative    Fentanyl,  Urine Negative Negative   Basic Metabolic Panel    Collection Time: 09/01/24  5:09 AM    Specimen: Blood   Result Value Ref Range    Glucose 90 65 - 99 mg/dL    BUN 20 8 - 23 mg/dL    Creatinine 1.02 0.76 - 1.27 mg/dL    Sodium 139 136 - 145 mmol/L    Potassium 4.0 3.5 - 5.2 mmol/L    Chloride 106 98 - 107 mmol/L    CO2 25.0 22.0 - 29.0 mmol/L    Calcium 8.2 (L) 8.6 - 10.5 mg/dL    BUN/Creatinine Ratio 19.6 7.0 - 25.0    Anion Gap 8.0 5.0 - 15.0 mmol/L    eGFR 74.3 >60.0 mL/min/1.73   CBC (No Diff)    Collection Time: 09/01/24  5:09 AM    Specimen: Blood   Result Value Ref Range    WBC 4.71 3.40 - 10.80 10*3/mm3    RBC 3.80 (L) 4.14 - 5.80 10*6/mm3    Hemoglobin 11.8 (L) 13.0 - 17.7 g/dL    Hematocrit 35.6 (L) 37.5 - 51.0 %    MCV 93.7 79.0 - 97.0 fL    MCH 31.1 26.6 - 33.0 pg    MCHC 33.1 31.5 - 35.7 g/dL    RDW 12.9 12.3 - 15.4 %    RDW-SD 44.1 37.0 - 54.0 fl    MPV 10.8 6.0 - 12.0 fL    Platelets 152 140 - 450 10*3/mm3   Vitamin B12    Collection Time: 09/01/24  5:09 AM    Specimen: Blood   Result Value Ref Range    Vitamin B-12 808 211 - 946 pg/mL   Folate    Collection Time: 09/01/24  5:09 AM    Specimen: Blood   Result Value Ref Range    Folate 8.20 4.78 - 24.20 ng/mL       Radiology  No Radiology Exams Resulted Within Past 24 Hours    Medications given in the ED:  Medications   sodium chloride 0.9 % flush 10 mL (has no administration in time range)   sodium chloride 0.9 % flush 10 mL (10 mL Intravenous Given 8/31/24 5346)   sodium chloride 0.9 % flush 10 mL (has no administration in time range)   sodium chloride 0.9 % infusion 40 mL (has no administration in time range)   nitroglycerin (NITROSTAT) SL tablet 0.4 mg (has no administration in time range)   Potassium Replacement - Follow Nurse / BPA Driven Protocol (has no administration in time range)   acetaminophen (TYLENOL) tablet 650 mg (has no administration in time range)     Or   acetaminophen (TYLENOL) 160 MG/5ML oral solution 650 mg (has no  administration in time range)     Or   acetaminophen (TYLENOL) suppository 650 mg (has no administration in time range)   sennosides-docusate (PERICOLACE) 8.6-50 MG per tablet 2 tablet (has no administration in time range)     And   polyethylene glycol (MIRALAX) packet 17 g (has no administration in time range)     And   bisacodyl (DULCOLAX) EC tablet 5 mg (has no administration in time range)     And   bisacodyl (DULCOLAX) suppository 10 mg (has no administration in time range)   ondansetron ODT (ZOFRAN-ODT) disintegrating tablet 4 mg (has no administration in time range)     Or   ondansetron (ZOFRAN) injection 4 mg (has no administration in time range)   calcium carbonate (TUMS) chewable tablet 500 mg (200 mg elemental) (has no administration in time range)   sodium chloride 0.9 % infusion (100 mL/hr Intravenous New Bag 8/31/24 1030)   metoprolol tartrate (LOPRESSOR) tablet 12.5 mg (12.5 mg Oral Given 8/31/24 2155)   rivaroxaban (XARELTO) tablet 20 mg (20 mg Oral Given 8/31/24 1830)   carbidopa-levodopa (SINEMET)  MG per tablet 1.5 tablet (1.5 tablets Oral Given 9/1/24 0635)   atorvastatin (LIPITOR) tablet 40 mg (40 mg Oral Given 8/31/24 1628)   pantoprazole (PROTONIX) EC tablet 40 mg (40 mg Oral Given 8/31/24 1628)   levothyroxine (SYNTHROID, LEVOTHROID) tablet 112 mcg (112 mcg Oral Given 8/31/24 1628)   venlafaxine XR (EFFEXOR-XR) 24 hr capsule 150 mg (150 mg Oral Given 8/31/24 1627)   carbidopa-levodopa CR (SINEMET CR)  MG per CR tablet 1 tablet (1 tablet Oral Given 8/31/24 2155)   aspirin EC tablet 81 mg (81 mg Oral Given 8/31/24 1628)   sodium chloride 0.9 % bolus 500 mL (0 mL Intravenous Stopped 8/31/24 0821)       Orders placed during this visit:  Orders Placed This Encounter   Procedures    JOHN AURIS SCREEN - Swab, Axilla Right, Axilla Left and Groin    XR Chest 1 View    CT Head Without Contrast    Comprehensive Metabolic Panel    Urinalysis With Microscopic If Indicated (No Culture) -  Urine, Catheter    aPTT    Protime-INR    Single High Sensitivity Troponin T    Magnesium    CBC Auto Differential    Urinalysis, Microscopic Only - Urine, Clean Catch    Basic Metabolic Panel    CBC (No Diff)    Vitamin B12    Folate    TSH Rfx On Abnormal To Free T4    Ammonia    Urine Drug Screen - Indwelling Urethral Catheter    Procalcitonin    T4, Free    Diet: Cardiac; Healthy Heart (2-3 Na+); Fluid Consistency: Thin (IDDSI 0)    Vital Signs    Intake & Output    Weigh Patient    Oral Care    Place Sequential Compression Device    Maintain Sequential Compression Device    Maintain IV Access    Telemetry - Place Orders & Notify Provider of Results When Patient Experiences Acute Chest Pain, Dysrhythmia or Respiratory Distress    May Be Off Telemetry for Tests    Continuous Pulse Oximetry    Ambulate Patient    Up in Chair    Strict Intake & Output    Code Status and Medical Interventions: No CPR (Do Not Attempt to Resuscitate); Full Support    [facility] (on-call MD unless specified) Details    Inpatient Case Management  Consult    Inpatient Neurology Consult General    OT Consult: Eval & Treat ADL Performance Below Baseline, Discharge Placement Assessment    PT Consult: Eval & Treat Discharge Placement Assessment, Functional Mobility Below Baseline    Incentive Spirometry    SLP Consult: Eval & Treat Communication Disorder    Telemetry Scan    Insert Peripheral IV    Insert Peripheral IV    Initiate Observation Status    CBC & Differential       Medical Decision Making:  ED Course as of 09/01/24 0638   Sat Aug 31, 2024   0417 I discussed the case with Dr. Suarez and he agrees to evaluate the patient at the bedside.    [AR]   0549 Creatinine(!): 1.30  Increased from 2 days ago when creatinine level was 1.03 [AR]   0549 HS Troponin T(!): 27  Stable compared to previous. [AR]   0549 Leukocytes, UA(!): Large (3+) [AR]   0549 WBC, UA(!): Too Numerous to Count [AR]   0549 Bacteria, UA: None Seen [AR]    0600 Pt care assumed  [MP]   0711 CT scan of head independently interpreted by me as no gross hemorrhage [MP]   0821 I spoke with Elizabeth with [facility].  Discussed patient presentation and ED findings.  She agrees admit patient to a telemetry observation bed to the service of Dr. Smith. [MP]      ED Course User Index  [AR] Lauren Thorne APRN  [MP] Mable Leos, PATarasC       Differential diagnosis:  My differential diagnosis for altered mental status includes but is not limited to:  Hypoglycemia, hyperglycemia, DKA, overdose, ethanol intoxication, thiamine deficiency, niacin deficiency, hypothymia, hyperviscosity, Jacques’s disease, hyponatremia, hypernatremia, liver failure, kidney failure, hyper or hypothyroid, no insufficiency, hypoxia, hypercarbia, carbon monoxide poisoning, postanoxic encephalopathy, ischemic stroke, intracranial bleed, subarachnoid hemorrhage, brain tumor, closed head injury, epidural hematoma, epidural hematoma, seizure activity, postictal state, syncopal episode, disseminated encephalomyelitis, central pontine myelinolysis, post cardiac arrest, bacterial meningitis, viral meningitis, fungal meningitis, encephalitis, brain abscess, subdural empyema, hysteria, catatonic state, malingering, hypertensive encephalopathy, vasculitis, TTP, DIC      Diagnosis  Final diagnoses:   Altered mental status, unspecified altered mental status type   ADELINA (acute kidney injury)          Enrico Suarez MD  08/31/24 0646       Enrico Suarez MD  09/01/24 0646

## 2024-08-31 NOTE — ED TRIAGE NOTES
To ER via EMS from [facility].  To ER for AMS x 4 hours.  Pt normally alert and talkative with some episodes of confusion. Pt is alert but stares straight ahead.  Pt will make eye contact when you say his name but will not speak at time of triage.

## 2024-09-01 ENCOUNTER — READMISSION MANAGEMENT (OUTPATIENT)
Dept: CALL CENTER | Facility: HOSPITAL | Age: 79
End: 2024-09-01
Payer: MEDICARE

## 2024-09-01 VITALS
RESPIRATION RATE: 18 BRPM | OXYGEN SATURATION: 98 % | TEMPERATURE: 98 F | WEIGHT: 200 LBS | HEART RATE: 90 BPM | SYSTOLIC BLOOD PRESSURE: 129 MMHG | DIASTOLIC BLOOD PRESSURE: 86 MMHG | BODY MASS INDEX: 27.09 KG/M2 | HEIGHT: 72 IN

## 2024-09-01 LAB
ANION GAP SERPL CALCULATED.3IONS-SCNC: 8 MMOL/L (ref 5–15)
BACTERIA ISLT: NORMAL
BUN SERPL-MCNC: 20 MG/DL (ref 8–23)
BUN/CREAT SERPL: 19.6 (ref 7–25)
CALCIUM SPEC-SCNC: 8.2 MG/DL (ref 8.6–10.5)
CHLORIDE SERPL-SCNC: 106 MMOL/L (ref 98–107)
CO2 SERPL-SCNC: 25 MMOL/L (ref 22–29)
CREAT SERPL-MCNC: 1.02 MG/DL (ref 0.76–1.27)
DEPRECATED RDW RBC AUTO: 44.1 FL (ref 37–54)
EGFRCR SERPLBLD CKD-EPI 2021: 74.3 ML/MIN/1.73
ERYTHROCYTE [DISTWIDTH] IN BLOOD BY AUTOMATED COUNT: 12.9 % (ref 12.3–15.4)
FOLATE SERPL-MCNC: 8.2 NG/ML (ref 4.78–24.2)
GLUCOSE SERPL-MCNC: 90 MG/DL (ref 65–99)
HCT VFR BLD AUTO: 35.6 % (ref 37.5–51)
HGB BLD-MCNC: 11.8 G/DL (ref 13–17.7)
MCH RBC QN AUTO: 31.1 PG (ref 26.6–33)
MCHC RBC AUTO-ENTMCNC: 33.1 G/DL (ref 31.5–35.7)
MCV RBC AUTO: 93.7 FL (ref 79–97)
PLATELET # BLD AUTO: 152 10*3/MM3 (ref 140–450)
PMV BLD AUTO: 10.8 FL (ref 6–12)
POTASSIUM SERPL-SCNC: 4 MMOL/L (ref 3.5–5.2)
RBC # BLD AUTO: 3.8 10*6/MM3 (ref 4.14–5.8)
SODIUM SERPL-SCNC: 139 MMOL/L (ref 136–145)
VIT B12 BLD-MCNC: 808 PG/ML (ref 211–946)
WBC NRBC COR # BLD AUTO: 4.71 10*3/MM3 (ref 3.4–10.8)

## 2024-09-01 PROCEDURE — 36415 COLL VENOUS BLD VENIPUNCTURE: CPT | Performed by: NURSE PRACTITIONER

## 2024-09-01 PROCEDURE — 85027 COMPLETE CBC AUTOMATED: CPT | Performed by: NURSE PRACTITIONER

## 2024-09-01 PROCEDURE — 97166 OT EVAL MOD COMPLEX 45 MIN: CPT

## 2024-09-01 PROCEDURE — 96361 HYDRATE IV INFUSION ADD-ON: CPT

## 2024-09-01 PROCEDURE — G0378 HOSPITAL OBSERVATION PER HR: HCPCS

## 2024-09-01 PROCEDURE — 97162 PT EVAL MOD COMPLEX 30 MIN: CPT

## 2024-09-01 PROCEDURE — 97535 SELF CARE MNGMENT TRAINING: CPT

## 2024-09-01 PROCEDURE — 25810000003 SODIUM CHLORIDE 0.9 % SOLUTION: Performed by: NURSE PRACTITIONER

## 2024-09-01 PROCEDURE — 96360 HYDRATION IV INFUSION INIT: CPT

## 2024-09-01 PROCEDURE — 80048 BASIC METABOLIC PNL TOTAL CA: CPT | Performed by: NURSE PRACTITIONER

## 2024-09-01 PROCEDURE — 97530 THERAPEUTIC ACTIVITIES: CPT

## 2024-09-01 PROCEDURE — 99214 OFFICE O/P EST MOD 30 MIN: CPT | Performed by: PSYCHIATRY & NEUROLOGY

## 2024-09-01 PROCEDURE — 82607 VITAMIN B-12: CPT | Performed by: NURSE PRACTITIONER

## 2024-09-01 PROCEDURE — 82746 ASSAY OF FOLIC ACID SERUM: CPT | Performed by: NURSE PRACTITIONER

## 2024-09-01 RX ADMIN — LEVOTHYROXINE SODIUM 112 MCG: 112 TABLET ORAL at 08:10

## 2024-09-01 RX ADMIN — ATORVASTATIN CALCIUM 40 MG: 20 TABLET, FILM COATED ORAL at 08:10

## 2024-09-01 RX ADMIN — CARBIDOPA AND LEVODOPA 1.5 TABLET: 25; 100 TABLET ORAL at 11:17

## 2024-09-01 RX ADMIN — CARBIDOPA AND LEVODOPA 1.5 TABLET: 25; 100 TABLET ORAL at 13:58

## 2024-09-01 RX ADMIN — METOPROLOL TARTRATE 12.5 MG: 25 TABLET, FILM COATED ORAL at 08:10

## 2024-09-01 RX ADMIN — Medication 10 ML: at 08:10

## 2024-09-01 RX ADMIN — SODIUM CHLORIDE 100 ML/HR: 9 INJECTION, SOLUTION INTRAVENOUS at 08:11

## 2024-09-01 RX ADMIN — RIVAROXABAN 20 MG: 20 TABLET, FILM COATED ORAL at 08:10

## 2024-09-01 RX ADMIN — CARBIDOPA AND LEVODOPA 1 TABLET: 50; 200 TABLET, EXTENDED RELEASE ORAL at 08:10

## 2024-09-01 RX ADMIN — ASPIRIN 81 MG: 81 TABLET, COATED ORAL at 08:10

## 2024-09-01 RX ADMIN — CARBIDOPA AND LEVODOPA 1.5 TABLET: 25; 100 TABLET ORAL at 06:35

## 2024-09-01 RX ADMIN — PANTOPRAZOLE SODIUM 40 MG: 40 TABLET, DELAYED RELEASE ORAL at 08:10

## 2024-09-01 RX ADMIN — VENLAFAXINE HYDROCHLORIDE 150 MG: 150 CAPSULE, EXTENDED RELEASE ORAL at 08:10

## 2024-09-01 NOTE — THERAPY EVALUATION
Patient Name: Darshan Bhatt  : 1945    MRN: [id number]                              Today's Date: 2024       Admit Date: 2024    Visit Dx:     ICD-10-CM ICD-9-CM   1. Altered mental status, unspecified altered mental status type  R41.82 780.97   2. ADELIAN (acute kidney injury)  N17.9 584.9     Patient Active Problem List   Diagnosis    PAF (paroxysmal atrial fibrillation)    Anxiety    Coronary arteriosclerosis in native artery    Benign prostatic hyperplasia    Depression    Dizziness    Hemorrhoids    Hyperlipidemia    Hypothyroidism    Malignant melanoma of skin    Sleep apnea    Vitamin B12 deficiency    Angiodysplasia of colon without bleeding    Chronic anticoagulation    IFG (impaired fasting glucose)    Orthostatic hypotension    Parkinson disease    BPH with urinary obstruction    Urinary retention    Left-sided weakness    Dementia without behavioral disturbance    WPW (Sammy-Parkinson-White syndrome)    Altered mental status    Stage 2 chronic kidney disease    Abnormal finding on urinalysis    Elevated troponin     Past Medical History:   Diagnosis Date    A-fib     Allergic     BPH (benign prostatic hyperplasia)     CAD (coronary artery disease)     Cancer     skin    Erectile dysfunction     Hyperlipidemia     Hypogonadism in male     Hypothyroidism     Movement disorder     Parkinson's disease     Sleep apnea     Thyroid disease      Past Surgical History:   Procedure Laterality Date    CARDIAC CATHETERIZATION      COLONOSCOPY      INGUINAL HERNIA REPAIR      PROSTATE SURGERY      SKIN CANCER DESTRUCTION      SUPRAPUBIC TUBE PLACEMENT N/A 2020    Procedure: CYSTOSCOPY SUPRAPUBIC CATHETER PLACEMENT;  Surgeon: Linwood Cox MD;  Location: [facility];  Service: Urology;  Laterality: N/A;    THYROIDECTOMY, PARTIAL        General Information       Row Name 24 1144          OT Time and Intention    Document Type evaluation  -JW     Mode of Treatment occupational therapy  -JW   "     Row Name 09/01/24 1144          General Information    Patient Profile Reviewed yes  -     Prior Level of Function min assist:;ADL's  reports receiving assist for \"everything from the ground up\". Does not provide much more detail. Does use a walker for mobility  -     Existing Precautions/Restrictions fall  -     Barriers to Rehab cognitive status  -       Row Name 09/01/24 1144          Living Environment    [facility] facility resident  skilled nursing  -       Row Name 09/01/24 1144          Cognition    Orientation Status (Cognition) oriented to;person  follows simple commands, some confusion but able to hold conversation appropriately.  -       Row Name 09/01/24 1144          Safety Issues, Functional Mobility    Impairments Affecting Function (Mobility) strength;coordination;endurance/activity tolerance  -               User Key  (r) = Recorded By, (t) = Taken By, (c) = Cosigned By      Initials Name Provider Type    Jahaira Sahni OT Occupational Therapist                     Mobility/ADL's       Row Name 09/01/24 1146          Bed Mobility    Bed Mobility supine-sit  -     Supine-Sit [place] (Bed Mobility) standby assist  -     Assistive Device (Bed Mobility) head of bed elevated;bed rails  -     Comment, (Bed Mobility) increased time  -       Row Name 09/01/24 1146          Transfers    Transfers sit-stand transfer  -       Row Name 09/01/24 1146          Sit-Stand Transfer    Sit-Stand [place] (Transfers) minimum assist (75% patient effort);1 person assist  -     Assistive Device (Sit-Stand Transfers) walker, front-wheeled  -       Row Name 09/01/24 1146          Functional Mobility    Functional Mobility- Comment CGA for fxl ambulation around room and in hallway using RW.  -       Row Name 09/01/24 1146          Activities of Daily Living    BADL Assessment/Intervention grooming;bathing  -       Row Name 09/01/24 1146          Grooming Assessment/Training    " [place] Level (Grooming) grooming skills;oral care regimen;hair care, combing/brushing;set up;supervision  -     Oral Care teeth brushed - regular toothbrush  -     Position (Grooming) sink side;supported standing  -     Comment, (Grooming) SBA-CGA for prolonged standing at the sink  -[facility] Name 09/01/24 1146          Bathing Assessment/Intervention    [place] Level (Bathing) upper body;set up;supervision  -     Position (Bathing) sink side  -               User Key  (r) = Recorded By, (t) = Taken By, (c) = Cosigned By      Initials Name Provider Type    Jahaira Sahni OT Occupational Therapist                   Obj/Interventions       [facility] Name 09/01/24 1147          Range of Motion Comprehensive    General Range of Motion bilateral upper extremity ROM WNL  -[facility] Name 09/01/24 1147          Strength Comprehensive (MMT)    Comment, General Manual Muscle Testing (MMT) Assessment UE strength WFL  -JW       Row Name 09/01/24 1147          Balance    Static Sitting Balance supervision  -     Dynamic Sitting Balance supervision  -     Position, Sitting Balance sitting edge of bed  -     Static Standing Balance standby assist  -     Dynamic Standing Balance contact guard;standby assist  -     Position/Device Used, Standing Balance walker, front-wheeled  -     Balance Interventions sitting;standing;occupation based/functional task  -               User Key  (r) = Recorded By, (t) = Taken By, (c) = Cosigned By      Initials Name Provider Type    Jahaira Sahni OT Occupational Therapist                   Goals/Plan    No documentation.                  Clinical Impression       [facility] Name 09/01/24 1150          Pain Assessment    Pretreatment Pain Rating 0/10 - no pain  -     Posttreatment Pain Rating 0/10 - no pain  -       Row Name 09/01/24 1150          Plan of Care Review    Plan of Care Reviewed With patient  -     Outcome Evaluation Pt is an 77 y/o M admitted with  "AMS, ADELINA. Hx of parkinsons dx, dementia. He is from an residential and reports getting help with \"everything from the ground up\" but unable to clarify exactly his assist level for ADLs. Does state he uses a walker for mobility. Presents very pleasant although somwhat confused. He sits EOB with SBA, stands with Ángel and uses RW for fxl ambulation around room and in hallway. He stands at the sink multiple mins to complete grooming ADLs and UB bathing with set-up/spv. Pt appears to be close to his baseline fxn and does not appear to have further OT needs in the acute care setting. Rec return to [facility]  -       Row Name 09/01/24 1150          Therapy Assessment/Plan (OT)    Criteria for Skilled Therapeutic Interventions Met (OT) no problems identified which require skilled intervention  -     Therapy Frequency (OT) evaluation only  -       Row Name 09/01/24 1150          Therapy Plan Review/Discharge Plan (OT)    Anticipated Discharge Disposition (OT) assisted living  -       Row Name 09/01/24 1150          Positioning and Restraints    Pre-Treatment Position in bed  -     Post Treatment Position chair  -JW     In Chair notified nsg;sitting;call light within reach;encouraged to call for assist;exit alarm on;legs elevated  -               User Key  (r) = Recorded By, (t) = Taken By, (c) = Cosigned By      Initials Name Provider Type    Jahaira Sahni, BONNIE Occupational Therapist                   Outcome Measures       Row Name 09/01/24 1157          How much help from another is currently needed...    Putting on and taking off regular lower body clothing? 3  -JW     Bathing (including washing, rinsing, and drying) 3  -JW     Toileting (which includes using toilet bed pan or urinal) 3  -JW     Putting on and taking off regular upper body clothing 3  -JW     Taking care of personal grooming (such as brushing teeth) 3  -JW     Eating meals 4  -JW     AM-PAC 6 Clicks Score (OT) 19  -       Row Name 09/01/24 1103 09/01/24 " 0000       How much help from another person do you currently need...    Turning from your back to your side while in flat bed without using bedrails? 3  -MO 2  -OE    Moving from lying on back to sitting on the side of a flat bed without bedrails? 3  -MO 2  -OE    Moving to and from a bed to a chair (including a wheelchair)? 3  -MO 2  -OE    Standing up from a chair using your arms (e.g., wheelchair, bedside chair)? 3  -MO 2  -OE    Climbing 3-5 steps with a railing? 2  -MO 2  -OE    To walk in hospital room? 3  -MO 2  -OE    AM-PAC 6 Clicks Score (PT) 17  -MO 12  -OE    Highest Level of Mobility Goal 5 --> Static standing  -MO 4 --> Transfer to chair/commode  -OE      Row Name 09/01/24 1157 09/01/24 1103       Functional Assessment    Outcome Measure Options AM-PAC 6 Clicks Daily Activity (OT)  - AM-PAC 6 Clicks Basic Mobility (PT)  -MO              User Key  (r) = Recorded By, (t) = Taken By, (c) = Cosigned By      Initials Name Provider Type    Jahaira Sahni, BONNIE Occupational Therapist    Ana Mcdowell, PT Physical Therapist    Mac Bradford, RN Registered Nurse                    Occupational Therapy Education       Title: PT OT SLP Therapies (In Progress)       Topic: Occupational Therapy (In Progress)       Point: ADL training (Done)       Description:   Instruct learner(s) on proper safety adaptation and remediation techniques during self care or transfers.   Instruct in proper use of assistive devices.                  Learning Progress Summary             Patient Acceptance, E, VU,NR by KAYE at 9/1/2024 1157                         Point: Home exercise program (Not Started)       Description:   Instruct learner(s) on appropriate technique for monitoring, assisting and/or progressing therapeutic exercises/activities.                  Learner Progress:  Not documented in this visit.              Point: Precautions (Done)       Description:   Instruct learner(s) on prescribed precautions during self-care  "and functional transfers.                  Learning Progress Summary             Patient Acceptance, E, VU,NR by  at 9/1/2024 1157                         Point: Body mechanics (Done)       Description:   Instruct learner(s) on proper positioning and spine alignment during self-care, functional mobility activities and/or exercises.                  Learning Progress Summary             Patient Acceptance, E, VU,NR by  at 9/1/2024 1157                                         User Key       Initials Effective Dates Name Provider Type Discipline     06/10/21 -  Jahaira Luong, BONNIE Occupational Therapist OT                  OT Recommendation and Plan  Therapy Frequency (OT): evaluation only  Plan of Care Review  Plan of Care Reviewed With: patient  Outcome Evaluation: Pt is an 77 y/o M admitted with AMS, ADELINA. Hx of parkinsons dx, dementia. He is from an [facility] and reports getting help with \"everything from the ground up\" but unable to clarify exactly his assist level for ADLs. Does state he uses a walker for mobility. Presents very pleasant although somwhat confused. He sits EOB with SBA, stands with Ángel and uses RW for fxl ambulation around room and in hallway. He stands at the sink multiple mins to complete grooming ADLs and UB bathing with set-up/spv. Pt appears to be close to his baseline fxn and does not appear to have further OT needs in the acute care setting. Rec return to [facility]     Time Calculation:   Evaluation Complexity (OT)  Review Occupational Profile/Medical/Therapy History Complexity: expanded/moderate complexity  Assessment, Occupational Performance/Identification of Deficit Complexity: 3-5 performance deficits  Clinical Decision Making Complexity (OT): detailed assessment/moderate complexity  Overall Complexity of Evaluation (OT): moderate complexity     Time Calculation- OT       Row Name 09/01/24 1158             Time Calculation- OT    OT Start Time 0853  -      OT Stop Time 0925  -      OT Time " Calculation (min) 32 min  -      Total Timed Code Minutes- OT 23 minute(s)  -JW      OT Received On 09/01/24  -         Timed Charges    50148 - OT Self Care/Mgmt Minutes 23  -JW         Untimed Charges    OT Eval/Re-eval Minutes 9  -JW         Total Minutes    Timed Charges Total Minutes 23  -JW      Untimed Charges Total Minutes 9  -JW       Total Minutes 32  -JW                User Key  (r) = Recorded By, (t) = Taken By, (c) = Cosigned By      Initials Name Provider Type    Jahaira Sahni OT Occupational Therapist                  Therapy Charges for Today       Code Description Service Date Service Provider Modifiers Qty    [id number]  OT SELF CARE/MGMT/TRAIN EA 15 MIN 9/1/2024 Jahaira Luong OT GO 2    [id number]  OT EVAL MOD COMPLEXITY 2 9/1/2024 Jahaira Luong OT GO 1                 Jahaira Luong OT  9/1/2024

## 2024-09-01 NOTE — DISCHARGE SUMMARY
Patient Name: Darshan Bhatt  : 1945  MRN: [id number]    Date of Admission: 2024  Date of Discharge:  2024  Primary Care Physician: Provider, No Known      Chief Complaint:   Altered Mental Status      Discharge Diagnoses     Active Hospital Problems    Diagnosis  POA    **Altered mental status [R41.82]  Yes    Stage 2 chronic kidney disease [N18.2]  Yes    Abnormal finding on urinalysis [R82.90]  Yes    Elevated troponin [R79.89]  Yes    Dementia without behavioral disturbance [F03.90]  Yes    WPW (Sammy-Parkinson-White syndrome) [I45.6]  Yes    Parkinson disease [G20.A1]  Yes    Chronic anticoagulation [Z79.01]  Not Applicable    Benign prostatic hyperplasia [N40.0]  Yes    Coronary arteriosclerosis in native artery [I25.10]  Yes    PAF (paroxysmal atrial fibrillation) [I48.0]  Yes      Resolved Hospital Problems   No resolved problems to display.        Hospital Course     Mr. Bhatt is a 78 y.o. male with a history of Parkinson's disease with dementia who presented to [facility] initially complaining of alteration mental status.  Please see the admitting history and physical for further details.  He was seen in consultation by neurology who felt he had exacerbation of his underlying Parkinson's disease.  They recommended continuing home Sinemet therapy with no additional workup.  He is under the care of Dr. Navarro who has stated cannot escalate therapy further due to coexisting orthostasis and issues with hallucination.  Patient's mental status seems better today.  No other acute medical issues during hospital stay.  Plan discharge back to assisted living with no change in preadmission medications.      Day of Discharge     Subjective:  Confusion seems improved.    Physical Exam:  Temp:  [97.4 °F (36.3 °C)-98 °F (36.7 °C)] 98 °F (36.7 °C)  Heart Rate:  [76-90] 90  Resp:  [16-18] 18  BP: (107-158)/() 129/86  Body mass index is 27.12 kg/m².  Physical Exam  Vitals and nursing  note reviewed.   Constitutional:       Appearance: Normal appearance.   Pulmonary:      Effort: Pulmonary effort is normal.   Neurological:      Mental Status: He is alert. Mental status is at baseline.       Consultants     Consult Orders (all) (From admission, onward)       Start     Ordered    08/31/24 1128  Inpatient Neurology Consult General  Once        Specialty:  Neurology  Provider:  Darshan Sanchez MD    08/31/24 1128    08/31/24 1036  Inpatient Case Management  Consult  Once        Provider:  (Not yet assigned)    08/31/24 1035        Procedures     Imaging Results (All)       Procedure Component Value Units Date/Time    CT Head Without Contrast [[id number]] Collected: 08/31/24 0718     Updated: 08/31/24 0803    Narrative:      CT HEAD WITHOUT CONTRAST     HISTORY: Altered mental status.     TECHNIQUE:  Head CT includes axial imaging from the base of skull to the  vertex without intravenous contrast. Radiation dose reduction techniques  were utilized, including automated exposure control and exposure  modulation based on body size.     COMPARISON: MRI brain 08/27/2024, CT head 08/26/2024     FINDINGS: There are no abnormal areas of increased attenuation intra  axially to suggest hemorrhage. No extra-axial fluid collection is  observed. There is no evidence for cerebral edema or mass effect or  shift of the midline structures. Ventricles appear within normal limits  for size and configuration. Mastoid air cells and visualized paranasal  sinuses appear clear.       Impression:      No evidence for acute intracranial abnormality.     This report was finalized on 8/31/2024 8:00 AM by Owen Edwards M.D  on Workstation: [id number]       XR Chest 1 View [[id number]] Collected: 08/31/24 0423     Updated: 08/31/24 0427    Narrative:      SINGLE VIEW OF THE CHEST     HISTORY: Altered mental status     COMPARISON: None available.     FINDINGS:  There is cardiomegaly. There is no vascular  congestion. No pneumothorax  or pleural effusion is seen. No acute infiltrates are seen.       Impression:      No acute findings.     This report was finalized on 8/31/2024 4:24 AM by Dr. Gracie Matute M.D on Workstation: [id number]          Results for orders placed during the hospital encounter of 08/26/24    Duplex Carotid Ultrasound CAR    Interpretation Summary    Right internal carotid artery demonstrates a less than 50% stenosis.    Antegrade right vertebral flow.    Left internal carotid artery demonstrates a less than 50% stenosis.    Antegrade left vertebral flow.      Pertinent Labs     Results from last 7 days   Lab Units 09/01/24  0509 08/31/24  0458 08/29/24  0631 08/28/24  1209   WBC 10*3/mm3 4.71 5.88 5.78 5.49   HEMOGLOBIN g/dL 11.8* 12.5* 12.5* 13.0   PLATELETS 10*3/mm3 152 147 153 145     Results from last 7 days   Lab Units 09/01/24  0509 08/31/24  0458 08/29/24  0631 08/28/24  1209   SODIUM mmol/L 139 138 141 138   POTASSIUM mmol/L 4.0 4.1 4.1 4.4   CHLORIDE mmol/L 106 100 104 103   CO2 mmol/L 25.0 28.0 28.5 26.0   BUN mg/dL 20 30* 19 20   CREATININE mg/dL 1.02 1.30* 1.03 1.08   GLUCOSE mg/dL 90 96 87 97   EGFR mL/min/1.73 74.3 55.5* 73.4 69.4     Results from last 7 days   Lab Units 08/31/24  0458   ALBUMIN g/dL 3.8   BILIRUBIN mg/dL 0.5   ALK PHOS U/L 62   AST (SGOT) U/L 12   ALT (SGPT) U/L <5     Results from last 7 days   Lab Units 09/01/24  0509 08/31/24  0458 08/29/24  0631 08/28/24  1209 08/27/24  0314 08/26/24  1615   CALCIUM mg/dL 8.2* 8.9 9.1 9.0   < > 9.5   ALBUMIN g/dL  --  3.8  --   --   --   --    MAGNESIUM mg/dL  --  1.9  --   --   --  2.0    < > = values in this interval not displayed.     Results from last 7 days   Lab Units 08/31/24  1148   AMMONIA umol/L <10*     Results from last 7 days   Lab Units 08/31/24  0458 08/28/24  1907 08/28/24  1658 08/28/24  1503   HSTROP T ng/L 27* 28* 27* 27*       Results from last 7 days   Lab Units 08/27/24  0314   CHOLESTEROL mg/dL 99    TRIGLYCERIDES mg/dL 48   HDL CHOL mg/dL 49   LDL CHOL mg/dL 38             Test Results Pending at Discharge     Pending Labs       Order Current Status    CANDIDA AURIS SCREEN - Swab, Axilla Right, Axilla Left and Groin Preliminary result            Discharge Details        Discharge Medications        Continue These Medications        Instructions Start Date   acetaminophen 650 MG 8 hr tablet  Commonly known as: TYLENOL   650 mg, Oral, Every 6 Hours PRN      ACIDOPHILUS PO   1 tablet, Oral, Every Morning      aspirin 81 MG tablet   81 mg, Oral, Daily      atorvastatin 40 MG tablet  Commonly known as: LIPITOR   TAKE 1 TABLET EVERY DAY      B-12 1000 MCG sublingual tablet   1 tablet, Sublingual, Daily      calcium carbonate 500 MG chewable tablet  Commonly known as: TUMS   1 tablet, Oral, Every 4 Hours PRN      carbidopa-levodopa  MG per tablet  Commonly known as: SINEMET   1.5 tablets, Oral, 5 Times Daily, 530am 10am 1pm 4pm  6pm      carbidopa-levodopa CR  MG per CR tablet  Commonly known as: SINEMET CR   1 tablet, Oral, 2 Times Daily, 8am 8pm      cetirizine 10 MG tablet  Commonly known as: zyrTEC   10 mg, Oral, Daily PRN      docusate sodium 100 MG capsule  Commonly known as: COLACE   100 mg, Oral, Daily PRN      levothyroxine 112 MCG tablet  Commonly known as: SYNTHROID, LEVOTHROID   112 mcg, Oral, Daily      melatonin 5 MG tablet tablet   5 mg, Oral, Nightly PRN      metoprolol tartrate 25 MG tablet  Commonly known as: LOPRESSOR   12.5 mg, Oral, 2 Times Daily      nitroglycerin 0.3 MG SL tablet  Commonly known as: NITROSTAT   0.3 mg, Sublingual, Every 5 Minutes PRN      pantoprazole 40 MG EC tablet  Commonly known as: PROTONIX   TAKE 1 TABLET EVERY DAY      polyethylene glycol 17 g packet  Commonly known as: MIRALAX   17 g, Oral, Daily PRN      rivaroxaban 20 MG tablet  Commonly known as: XARELTO   Take 1 tablet by mouth Daily.      venlafaxine  MG 24 hr capsule  Commonly known as:  EFFEXOR-XR   150 mg, Oral, Daily      Vitamin D2 50 MCG (2000 UT) tablet   1 tablet, Oral, Every Morning             Stop These Medications      mupirocin 2 % cream  Commonly known as: BACTROBAN              Allergies   Allergen Reactions    Meloxicam Nausea Only       Discharge Disposition:  Home or Self Care      Discharge Diet:  Diet Order   Procedures    Diet: Cardiac; Healthy Heart (2-3 Na+); Fluid Consistency: Thin (IDDSI 0)       Discharge Activity:   Activity Instructions       Activity as Tolerated              CODE STATUS:    Code Status and Medical Interventions: No CPR (Do Not Attempt to Resuscitate); Full Support   Ordered at: 08/31/24 1126     Code Status (Patient has no pulse and is not breathing):    No CPR (Do Not Attempt to Resuscitate)     Medical Interventions (Patient has pulse or is breathing):    Full Support       No future appointments.  Additional Instructions for the Follow-ups that You Need to Schedule       Discharge Follow-up with PCP   As directed       Currently Documented PCP:    Provider, No Known    PCP Phone Number:    [phone]     Follow Up Details: 1 to 2 weeks (or sooner if problems)               Contact information for follow-up providers       Provider, No Known .    Why: 1 to 2 weeks (or sooner if problems)  Contact information:  [facility] [address] [phone]                       Contact information for after-discharge care       Destination       [facility] .    Service: Assisted Living  Contact information:  [address] [facility] [address] [phone]                                   Additional Instructions for the Follow-ups that You Need to Schedule       Discharge Follow-up with PCP   As directed       Currently Documented PCP:    Provider, No Known    PCP Phone Number:    [phone]     Follow Up Details: 1 to 2 weeks (or sooner if problems)            Time Spent on Discharge:  Greater than 30  minutes      Narendra Smith MD  [facility]  09/01/24  12:23 EDT

## 2024-09-01 NOTE — CONSULTS
"Neurology Consult Note    Consult Date: 9/1/2024    Referring MD: Narendra Smith MD    Reason for Consult I have been asked to see the patient in neurological consultation to render advice and opinion regarding altered mental status    Darshan Bhatt is a 78 y.o. male with history of A-fib on Xarelto, hyperlipidemia, idiopathic Parkinson's disease followed by Dr. Zachary Navarro with [facility].  At baseline he takes Sinemet  times daily with Sinemet CR  twice daily.  Per Dr. Navarro patient also struggles with visual hallucinations and orthostatic hypotension.  He takes midodrine and Nuplazid.  Additional escalations of his Sinemet were deferred because of these issues.  He was just recently seen by our service last week for an episode of transient facial droop.  MRI was negative and bilateral carotid duplex showed no flow-limiting disease. He was discharged.  He was brought back to the emergency department early this morning for altered mental status.  Family reported that he was not speaking.  On arrival to the ER he was speaking normally.  He was admitted to the hospital for altered mental status and we were consulted.  Today he feels mostly back to his neurologic baseline.    Past Medical History:   Diagnosis Date    A-fib     Allergic     BPH (benign prostatic hyperplasia)     CAD (coronary artery disease)     Cancer     skin    Erectile dysfunction     Hyperlipidemia     Hypogonadism in male     Hypothyroidism     Movement disorder     Parkinson's disease     Sleep apnea     Thyroid disease        Exam  /78 (BP Location: Left arm, Patient Position: Lying)   Pulse 76   Temp 97.4 °F (36.3 °C) (Oral)   Resp 18   Ht 182.9 cm (72\")   Wt 90.7 kg (200 lb)   SpO2 98%   BMI 27.12 kg/m²   Gen: NAD, vitals reviewed  MS: Mildly disoriented, memory impaired, normal attention, language intact, no neglect  CN: visual acuity grossly normal, PERRL, EOMI, no facial droop, severe hypophonia " and slow difficult to understand speech with moderate dysarthria  Motor: 5/5 throughout upper and lower extremities, increased tone, bradykinesia and hypokinesia    DATA:    Lab Results   Component Value Date    GLUCOSE 90 09/01/2024    CALCIUM 8.2 (L) 09/01/2024     09/01/2024    K 4.0 09/01/2024    CO2 25.0 09/01/2024     09/01/2024    BUN 20 09/01/2024    CREATININE 1.02 09/01/2024    EGFRIFAFRI >60 03/14/2023    EGFRIFNONA 54 (L) 05/25/2021    BCR 19.6 09/01/2024    ANIONGAP 8.0 09/01/2024     Lab Results   Component Value Date    WBC 4.71 09/01/2024    HGB 11.8 (L) 09/01/2024    HCT 35.6 (L) 09/01/2024    MCV 93.7 09/01/2024     09/01/2024       Lab review: CBC, BMP unremarkable    Imaging review: I personally reviewed his MRI brain from his previous admission which showed no acute stroke.  CT done in the emergency department showed no changes.  Radiology reports reviewed.    Diagnoses:  Idiopathic Parkinson's disease  Paroxysmal atrial fibrillation  Anticoagulated with Xarelto  Slurred speech    Comment: Patient appears to have had an exacerbation of his underlying Parkinson's disease.  I think these episodes are unlikely to be TIAs but note that he is anticoagulated on Xarelto and further workup would not likely change his management.  Currently on exam he has hypophonia and dysarthria more in keeping with advanced Parkinson's disease.  I reviewed Dr. Navarro' note from a week ago.  He notes that he cannot escalate his therapy further due to coexisting orthostasis and issues with hallucinations.    PLAN:  Continue home dose of Sinemet and Sinemet CR  No further inpatient neurologic workup needed

## 2024-09-01 NOTE — PLAN OF CARE
Goal Outcome Evaluation:  Plan of Care Reviewed With: patient        Progress: improving     Patient was alert and oriented all through the shift, he was carried along with his plan of care. Vital sign monitored, IV fluid still ongoing. He had a calm rest all through, no complain made this moment. Appropriate PPE was utilized with patient care.

## 2024-09-01 NOTE — CASE MANAGEMENT/SOCIAL WORK
Continued Stay Note  [facility]     Patient Name: Darshan Bhatt  MRN: [id number]  Today's Date: 9/1/2024    Admit Date: 8/31/2024    Plan: return to [facility] via private auto   Discharge Plan       Row Name 09/01/24 1230       Plan    Plan return to [facility] via private auto    Patient/Family in Agreement with Plan yes    Plan Comments Contacted by MD re: DC today and request to make sure pt is OK to DC back to his facility. Chart reviewed. Pt is from [facility]. Called and spoke with staff at [facility] and left my contact info for the nurse for his second floor apt. Return call from [facility] and she states she will need the DC summary with any new meds faxed to her at [phone]. DC summary faxed as requested. Called and spoke with pt's brother Osei who states he will be able to drive pt home and will be to the hospital around 1:30PM. Updated MD and staff RN...........JW                   Discharge Codes    No documentation.                 Expected Discharge Date and Time       Expected Discharge Date Expected Discharge Time    Sep 1, 2024               Erika Albrecht, RN

## 2024-09-01 NOTE — PLAN OF CARE
Goal Outcome Evaluation: patient discharging back to [facility] living [facility].  PIV removed and DC instructions reviewed with the patient and understanding verbalized.

## 2024-09-01 NOTE — PLAN OF CARE
Goal Outcome Evaluation:  Plan of Care Reviewed With: patient           Outcome Evaluation: 79 y/o male pt admitted to [facility] ED from his [facility] on 8/31 w/ AMS, negative for acute stroke findings, workup likely showing more related to PD exacerbation. Significant hx includes dementia and PD. Pt poor historian, unsure of accuracy or full acuity of needs however he reports that he has intermittent assist with ADLs as needed and amb around nursing facility with rwx. Today at evaluation, he demos s/s throughout consistent with PD, completes bed mobility with SBA and increased time, stands with min Ax1/ rwx, and amb ~200' with rwx and SBA/CGA and noted gait deficits. He has no overt LOB throughout, stands at sink for several minutes with OT without any noted LE buckling or sway. At this time, pt does demo generalized deconditioning and functional mobility deficits however anticipate he is near his baseline mobility status. He is safe to return to PLOF at [facility] when medically ready. At this time, no acute skilled PT needs required, he is safe for amb with nursing staff and rwx several times per day to prevent further deconditioning.      Anticipated Discharge Disposition (PT): assisted living

## 2024-09-01 NOTE — PLAN OF CARE
"Goal Outcome Evaluation:  Plan of Care Reviewed With: patient           Outcome Evaluation: Pt is an 77 y/o M admitted with AMS, ADELINA. Hx of parkinsons dx, dementia. He is from an correction and reports getting help with \"everything from the ground up\" but unable to clarify exactly his assist level for ADLs. Does state he uses a walker for mobility. Presents very pleasant although somwhat confused. He sits EOB with SBA, stands with Ángel and uses RW for fxl ambulation around room and in hallway. He stands at the sink multiple mins to complete grooming ADLs and UB bathing with set-up/spv. Pt appears to be close to his baseline fxn and does not appear to have further OT needs in the acute care setting. Rec return to correction      Anticipated Discharge Disposition (OT): assisted living                        "

## 2024-09-01 NOTE — THERAPY EVALUATION
Patient Name: Darshan Bhatt  : 1945    MRN: [id number]                              Today's Date: 2024       Admit Date: 2024    Visit Dx:     ICD-10-CM ICD-9-CM   1. Altered mental status, unspecified altered mental status type  R41.82 780.97   2. ADELINA (acute kidney injury)  N17.9 584.9     Patient Active Problem List   Diagnosis    PAF (paroxysmal atrial fibrillation)    Anxiety    Coronary arteriosclerosis in native artery    Benign prostatic hyperplasia    Depression    Dizziness    Hemorrhoids    Hyperlipidemia    Hypothyroidism    Malignant melanoma of skin    Sleep apnea    Vitamin B12 deficiency    Angiodysplasia of colon without bleeding    Chronic anticoagulation    IFG (impaired fasting glucose)    Orthostatic hypotension    Parkinson disease    BPH with urinary obstruction    Urinary retention    Left-sided weakness    Dementia without behavioral disturbance    WPW (Sammy-Parkinson-White syndrome)    Altered mental status    Stage 2 chronic kidney disease    Abnormal finding on urinalysis    Elevated troponin     Past Medical History:   Diagnosis Date    A-fib     Allergic     BPH (benign prostatic hyperplasia)     CAD (coronary artery disease)     Cancer     skin    Erectile dysfunction     Hyperlipidemia     Hypogonadism in male     Hypothyroidism     Movement disorder     Parkinson's disease     Sleep apnea     Thyroid disease      Past Surgical History:   Procedure Laterality Date    CARDIAC CATHETERIZATION      COLONOSCOPY      INGUINAL HERNIA REPAIR      PROSTATE SURGERY      SKIN CANCER DESTRUCTION      SUPRAPUBIC TUBE PLACEMENT N/A 2020    Procedure: CYSTOSCOPY SUPRAPUBIC CATHETER PLACEMENT;  Surgeon: Linwood Cox MD;  Location: [facility];  Service: Urology;  Laterality: N/A;    THYROIDECTOMY, PARTIAL        General Information       Row Name 24 1053          Physical Therapy Time and Intention    Document Type evaluation  -MO     Mode of Treatment  co-treatment;physical therapy  -MO       Row Name 09/01/24 1053          General Information    Patient Profile Reviewed yes  -MO     Prior Level of Function min assist:;ADL's  -MO       Row Name 09/01/24 1053          Living Environment    People in Home facility resident  -MO       Row Name 09/01/24 1053          Cognition    Orientation Status (Cognition) oriented to;person  Pt with baseline dementia and PD, follows all commands appropriately and intermittently answers questions appropriately  -MO       Row Name 09/01/24 1053          Safety Issues, Functional Mobility    Impairments Affecting Function (Mobility) strength;coordination;motor planning;motor control;endurance/activity tolerance  -MO               User Key  (r) = Recorded By, (t) = Taken By, (c) = Cosigned By      Initials Name Provider Type    Ana Mcdowell, PT Physical Therapist                   Mobility       Row Name 09/01/24 1054          Bed Mobility    Bed Mobility supine-sit  -MO     Supine-Sit [place] (Bed Mobility) standby assist  -MO     Assistive Device (Bed Mobility) head of bed elevated;bed rails  -MO     Comment, (Bed Mobility) Pt SBA for bed mobility, very slowed speeds with increased time to complete  -MO       Row Name 09/01/24 1054          Sit-Stand Transfer    Sit-Stand [place] (Transfers) minimum assist (75% patient effort);1 person assist  -MO     Assistive Device (Sit-Stand Transfers) walker, front-wheeled  -MO       Row Name 09/01/24 1054          Gait/Stairs (Locomotion)    [place] Level (Gait) contact guard;standby assist  -MO     Assistive Device (Gait) walker, front-wheeled  -MO     Patient was able to Ambulate yes  -MO     Distance in Feet (Gait) 200  -MO     Deviations/Abnormal Patterns (Gait) ataxic;base of support, narrow;festinating/shuffling;gait speed decreased;ryann decreased  -MO     Bilateral Gait Deviations forward flexed posture  -MO     Comment, (Gait/Stairs) no overt LOB noted  -MO                User Key  (r) = Recorded By, (t) = Taken By, (c) = Cosigned By      Initials Name Provider Type    Ana Mcdowell PT Physical Therapist                   Obj/Interventions       Row Name 09/01/24 1056          Strength Comprehensive (MMT)    Comment, General Manual Muscle Testing (MMT) Assessment Generalized deconditioning however no apparent acute focal strength deficits  -MO       Row Name 09/01/24 1056          Balance    Balance Assessment sitting static balance;sitting dynamic balance;standing static balance;standing dynamic balance  -MO     Static Sitting Balance supervision  -MO     Dynamic Sitting Balance standby assist  -MO     Position, Sitting Balance sitting edge of bed  -MO     Static Standing Balance standby assist  -MO     Dynamic Standing Balance standby assist;contact guard  -MO     Position/Device Used, Standing Balance walker, front-wheeled  -MO               User Key  (r) = Recorded By, (t) = Taken By, (c) = Cosigned By      Initials Name Provider Type    Ana Mcdowell PT Physical Therapist                   Goals/Plan    No documentation.                  Clinical Impression       Row Name 09/01/24 1057          Pain    Pretreatment Pain Rating 0/10 - no pain  -MO     Posttreatment Pain Rating 0/10 - no pain  -MO       Row Name 09/01/24 1057          Plan of Care Review    Plan of Care Reviewed With patient  -MO     Outcome Evaluation 77 y/o male pt admitted to [facility] ED from his [facility] on 8/31 w/ AMS, negative for acute stroke findings, workup likely showing more related to PD exacerbation. Significant hx includes dementia and PD. Pt poor historian, unsure of accuracy or full acuity of needs however he reports that he has intermittent assist with ADLs as needed and amb around nursing facility with rwx. Today at evaluation, he demos s/s throughout consistent with PD, completes bed mobility with SBA and increased time, stands with min Ax1/ rwx, and amb ~200' with rwx and SBA/CGA and noted  gait deficits. He has no overt LOB throughout, stands at sink for several minutes with OT without any noted LE buckling or sway. At this time, pt does demo generalized deconditioning and functional mobility deficits however anticipate he is near his baseline mobility status. He is safe to return to [facility] at [facility] when medically ready. At this time, no acute skilled PT needs required, he is safe for amb with nursing staff and rwx several times per day to prevent further deconditioning.  -MO       Row Name 09/01/24 1057          Therapy Assessment/Plan (PT)    Criteria for Skilled Interventions Met (PT) no problems identified which require skilled intervention  -MO     Therapy Frequency (PT) evaluation only  -MO       Row Name 09/01/24 1057          Vital Signs    O2 Delivery Pre Treatment room air  -MO     O2 Delivery Intra Treatment room air  -MO     O2 Delivery Post Treatment room air  -MO       Row Name 09/01/24 1057          Positioning and Restraints    Pre-Treatment Position in bed  -MO     Post Treatment Position other  Left in care of OT at session termination  -MO               User Key  (r) = Recorded By, (t) = Taken By, (c) = Cosigned By      Initials Name Provider Type    Ana Mcdowell, PT Physical Therapist                   Outcome Measures       Row Name 09/01/24 1103 09/01/24 0000       How much help from another person do you currently need...    Turning from your back to your side while in flat bed without using bedrails? 3  -MO 2  -OE    Moving from lying on back to sitting on the side of a flat bed without bedrails? 3  -MO 2  -OE    Moving to and from a bed to a chair (including a wheelchair)? 3  -MO 2  -OE    Standing up from a chair using your arms (e.g., wheelchair, bedside chair)? 3  -MO 2  -OE    Climbing 3-5 steps with a railing? 2  -MO 2  -OE    To walk in hospital room? 3  -MO 2  -OE    AM-PAC 6 Clicks Score (PT) 17  -MO 12  -OE    Highest Level of Mobility Goal 5 --> Static standing  -MO 4  --> Transfer to chair/commode  -OE      Row Name 09/01/24 1103          Functional Assessment    Outcome Measure Options AM-PAC 6 Clicks Basic Mobility (PT)  -MO               User Key  (r) = Recorded By, (t) = Taken By, (c) = Cosigned By      Initials Name Provider Type    Ana Mcdowell, PT Physical Therapist    Mac Bradford, RN Registered Nurse                                 Physical Therapy Education       Title: PT OT SLP Therapies (Done)       Topic: Physical Therapy (Done)       Point: Mobility training (Done)       Learning Progress Summary             Patient Acceptance, E, VU,NR by MO at 9/1/2024 1104                                         User Key       Initials Effective Dates Name Provider Type Discipline    MO 05/26/23 -  Ana Anglin PT Physical Therapist PT                  PT Recommendation and Plan     Plan of Care Reviewed With: patient  Outcome Evaluation: 79 y/o male pt admitted to [facility] ED from his [facility] on 8/31 w/ AMS, negative for acute stroke findings, workup likely showing more related to PD exacerbation. Significant hx includes dementia and PD. Pt poor historian, unsure of accuracy or full acuity of needs however he reports that he has intermittent assist with ADLs as needed and amb around nursing facility with rwx. Today at evaluation, he demos s/s throughout consistent with PD, completes bed mobility with SBA and increased time, stands with min Ax1/ rwx, and amb ~200' with rwx and SBA/CGA and noted gait deficits. He has no overt LOB throughout, stands at sink for several minutes with OT without any noted LE buckling or sway. At this time, pt does demo generalized deconditioning and functional mobility deficits however anticipate he is near his baseline mobility status. He is safe to return to [facility] at [facility] when medically ready. At this time, no acute skilled PT needs required, he is safe for amb with nursing staff and rwx several times per day to prevent further deconditioning.     Time  Calculation:         PT Charges       Row Name 09/01/24 1104             Time Calculation    Start Time 0853  -MO      Stop Time 0916  -MO      Time Calculation (min) 23 min  -MO      PT Non-Billable Time (min) 8 min  -MO      PT Received On 09/01/24  -MO         Time Calculation- PT    Total Timed Code Minutes- PT 15 minute(s)  -MO         Timed Charges    55363 - PT Therapeutic Activity Minutes 15  -MO         Untimed Charges    PT Eval/Re-eval Minutes 8  -MO         Total Minutes    Timed Charges Total Minutes 15  -MO      Untimed Charges Total Minutes 8  -MO       Total Minutes 23  -MO                User Key  (r) = Recorded By, (t) = Taken By, (c) = Cosigned By      Initials Name Provider Type    MO Ana Anglin, PT Physical Therapist                  Therapy Charges for Today       Code Description Service Date Service Provider Modifiers Qty    98041701725 HC PT THERAPEUTIC ACT EA 15 MIN 9/1/2024 Ana Anglin, PT GP 1    29497298986 HC PT EVAL MOD COMPLEXITY 2 9/1/2024 Ana Anglin, PT GP 1            PT G-Codes  Outcome Measure Options: AM-PAC 6 Clicks Basic Mobility (PT)  AM-PAC 6 Clicks Score (PT): 17  PT Discharge Summary  Anticipated Discharge Disposition (PT): assisted living    Ana Anglin PT  9/1/2024

## 2024-09-02 NOTE — CASE MANAGEMENT/SOCIAL WORK
Case Management Discharge Note      Final Note: Pt discharged to [facility] on 9/1.   NILTON Drummond RN         Selected Continued Care - Discharged on 9/1/2024 Admission date: 8/31/2024 - Discharge disposition: Home or Self Care      Destination Coordination complete.      Service Provider Selected Services Address Phone Fax Patient Preferred    [facility] [address] [address] [phone] -- --              Durable Medical Equipment    No services have been selected for the patient.                Dialysis/Infusion    No services have been selected for the patient.                Home Medical Care    No services have been selected for the patient.                Therapy    No services have been selected for the patient.                Community Resources    No services have been selected for the patient.                Community & DME    No services have been selected for the patient.                    Transportation Services  Private: Car    Final Discharge Disposition Code: 01 - home or self-care

## 2024-09-02 NOTE — OUTREACH NOTE
Prep Survey      Flowsheet Row Responses   [facility] facility patient discharged from? [place]   Is LACE score < 7 ? No   Eligibility Readm Mgmt   Discharge diagnosis Altered mental status   Does the patient have one of the following disease processes/diagnoses(primary or secondary)? Other   Does the patient have Home health ordered? No   Is there a DME ordered? No   General alerts for this patient [facility]   Prep survey completed? Yes            MELINDA MOORE - Registered Nurse

## 2024-09-05 ENCOUNTER — READMISSION MANAGEMENT (OUTPATIENT)
Dept: CALL CENTER | Facility: HOSPITAL | Age: 79
End: 2024-09-05
Payer: MEDICARE

## 2024-09-05 NOTE — OUTREACH NOTE
Medical Week 1 Survey      Flowsheet Row Responses   [facility] patient discharged from? [place]   Does the patient have one of the following disease processes/diagnoses(primary or secondary)? Other   Week 1 attempt successful? Yes   Call start time 1133   Revoke Readmitted  [Readmitted to [facility]]   General alerts for this patient [facility]   Discharge diagnosis Altered mental status   Person spoke with today (if not patient) and relationship pt   Week 1 call completed? Yes            Aniya HAGER - Registered Nurse

## 2024-09-06 LAB — BACTERIA ISLT: NORMAL

## (undated) DEVICE — INTRO CATH SUPRAPUB LAWRENCE 16FORNG

## (undated) DEVICE — URINE LEG BAG WITH EXTENSION TUBE: Brand: DOVER

## (undated) DEVICE — MARKR SKIN W/RULR AND LBL

## (undated) DEVICE — SUT SILK 2/0 FS BLK 18IN 685G

## (undated) DEVICE — NEEDLE, QUINCKE 22GX3.5": Brand: MEDLINE INDUSTRIES, INC.

## (undated) DEVICE — TIDISHIELD UROLOGY DRAIN BAGS FROSTY VINYL STERILE FITS SIEMENS UROSKOP ACCESS 20 PER CASE: Brand: TIDISHIELD

## (undated) DEVICE — CATHETER,FOLEY,100%SILICONE,16FR,10ML,LF: Brand: MEDLINE

## (undated) DEVICE — PLUG,CATHETER,DRAINAGE PROTECTOR,TUBE: Brand: MEDLINE

## (undated) DEVICE — DRAINBAG,ANTI-REFLUX TOWER,L/F,2000ML,LL: Brand: MEDLINE

## (undated) DEVICE — GLV SURG SIGNATURE ESSENTIAL PF LTX SZ8

## (undated) DEVICE — LOU CYSTO: Brand: MEDLINE INDUSTRIES, INC.

## (undated) DEVICE — SPNG GZ WOVN 4X4IN 12PLY 10/BX STRL

## (undated) DEVICE — SYR LL TP 10ML STRL